# Patient Record
Sex: FEMALE | Race: OTHER | URBAN - METROPOLITAN AREA
[De-identification: names, ages, dates, MRNs, and addresses within clinical notes are randomized per-mention and may not be internally consistent; named-entity substitution may affect disease eponyms.]

---

## 2017-07-29 ENCOUNTER — INPATIENT (INPATIENT)
Facility: HOSPITAL | Age: 62
LOS: 1 days | Discharge: HOME | End: 2017-07-31
Attending: INTERNAL MEDICINE

## 2017-07-29 DIAGNOSIS — I16.0 HYPERTENSIVE URGENCY: ICD-10-CM

## 2017-08-03 DIAGNOSIS — M06.9 RHEUMATOID ARTHRITIS, UNSPECIFIED: ICD-10-CM

## 2017-08-03 DIAGNOSIS — M32.14 GLOMERULAR DISEASE IN SYSTEMIC LUPUS ERYTHEMATOSUS: ICD-10-CM

## 2017-08-03 DIAGNOSIS — N18.9 CHRONIC KIDNEY DISEASE, UNSPECIFIED: ICD-10-CM

## 2017-08-03 DIAGNOSIS — I12.9 HYPERTENSIVE CHRONIC KIDNEY DISEASE WITH STAGE 1 THROUGH STAGE 4 CHRONIC KIDNEY DISEASE, OR UNSPECIFIED CHRONIC KIDNEY DISEASE: ICD-10-CM

## 2017-08-03 DIAGNOSIS — E85.4 ORGAN-LIMITED AMYLOIDOSIS: ICD-10-CM

## 2017-08-03 DIAGNOSIS — I16.0 HYPERTENSIVE URGENCY: ICD-10-CM

## 2017-08-03 DIAGNOSIS — E87.1 HYPO-OSMOLALITY AND HYPONATREMIA: ICD-10-CM

## 2017-08-03 DIAGNOSIS — Z79.52 LONG TERM (CURRENT) USE OF SYSTEMIC STEROIDS: ICD-10-CM

## 2022-10-19 ENCOUNTER — NON-APPOINTMENT (OUTPATIENT)
Age: 67
End: 2022-10-19

## 2024-05-10 ENCOUNTER — APPOINTMENT (OUTPATIENT)
Dept: ORTHOPEDIC SURGERY | Facility: CLINIC | Age: 69
End: 2024-05-10
Payer: MEDICARE

## 2024-05-10 DIAGNOSIS — M24.411 RECURRENT DISLOCATION, RIGHT SHOULDER: ICD-10-CM

## 2024-05-10 PROBLEM — Z00.00 ENCOUNTER FOR PREVENTIVE HEALTH EXAMINATION: Status: ACTIVE | Noted: 2024-05-10

## 2024-05-10 PROCEDURE — 99204 OFFICE O/P NEW MOD 45 MIN: CPT

## 2024-05-15 PROBLEM — M24.411 RECURRENT DISLOCATION OF RIGHT SHOULDER: Status: ACTIVE | Noted: 2024-05-15

## 2024-05-15 NOTE — HISTORY OF PRESENT ILLNESS
[de-identified] : Patient is here for evaluation of right shoulder pain Affecting quality of life Has pain and weakness with loss of rom Wakes up at night due to pain  h/o multiple dislocations  pmhx ra, lupus, amyloidosis  NAD Right shoulder: TTP ant GH joint, bicipital groove FF 0-190 Pain with terminal rom Weakness to abduction and ER Pos Impingement Pos Cornejo Pos Cross Arm Adduction Negative instability Positive scapula dyskinesia  mri/ct right shoulder: massive 3 tendon tear, arthropathy  Plan went over findings explained the imaging explained tx options due to tears and arthropathy with instability, will proceed with surgery  right reverse shoulder arthroplasty  Operative and nonoperative options discussed with patient. Surgical risks, benefits, and alternatives explained. Surgical risks include but are not exclusive to bleeding, infection, neurovascular damage, continued pain, stiffness, scarring, rsd, dvt/pe, potential failure of surgery that may require further surgery in the future. I went over incisions and rehabilitation. All questions answered.

## 2024-05-18 ENCOUNTER — INPATIENT (INPATIENT)
Facility: HOSPITAL | Age: 69
LOS: 5 days | Discharge: HOME HEALTH | DRG: 641 | End: 2024-05-24
Attending: INTERNAL MEDICINE | Admitting: STUDENT IN AN ORGANIZED HEALTH CARE EDUCATION/TRAINING PROGRAM
Payer: MEDICARE

## 2024-05-18 VITALS
OXYGEN SATURATION: 99 % | TEMPERATURE: 99 F | DIASTOLIC BLOOD PRESSURE: 82 MMHG | RESPIRATION RATE: 18 BRPM | HEART RATE: 80 BPM | SYSTOLIC BLOOD PRESSURE: 132 MMHG

## 2024-05-18 LAB
ALBUMIN SERPL ELPH-MCNC: 3.2 G/DL — LOW (ref 3.5–5.2)
ALP SERPL-CCNC: 60 U/L — SIGNIFICANT CHANGE UP (ref 30–115)
ALT FLD-CCNC: 8 U/L — SIGNIFICANT CHANGE UP (ref 0–41)
ANION GAP SERPL CALC-SCNC: 7 MMOL/L — SIGNIFICANT CHANGE UP (ref 7–14)
APTT BLD: 19.9 SEC — CRITICAL LOW (ref 27–39.2)
AST SERPL-CCNC: 26 U/L — SIGNIFICANT CHANGE UP (ref 0–41)
BASE EXCESS BLDV CALC-SCNC: -2.3 MMOL/L — LOW (ref -2–3)
BASOPHILS # BLD AUTO: 0.02 K/UL — SIGNIFICANT CHANGE UP (ref 0–0.2)
BASOPHILS NFR BLD AUTO: 0.5 % — SIGNIFICANT CHANGE UP (ref 0–1)
BILIRUB SERPL-MCNC: 0.2 MG/DL — SIGNIFICANT CHANGE UP (ref 0.2–1.2)
BUN SERPL-MCNC: 51 MG/DL — HIGH (ref 10–20)
CA-I SERPL-SCNC: 1.2 MMOL/L — SIGNIFICANT CHANGE UP (ref 1.15–1.33)
CALCIUM SERPL-MCNC: 8.1 MG/DL — LOW (ref 8.4–10.5)
CHLORIDE SERPL-SCNC: 100 MMOL/L — SIGNIFICANT CHANGE UP (ref 98–110)
CO2 SERPL-SCNC: 24 MMOL/L — SIGNIFICANT CHANGE UP (ref 17–32)
CREAT SERPL-MCNC: 2 MG/DL — HIGH (ref 0.7–1.5)
EGFR: 26 ML/MIN/1.73M2 — LOW
EOSINOPHIL # BLD AUTO: 0.06 K/UL — SIGNIFICANT CHANGE UP (ref 0–0.7)
EOSINOPHIL NFR BLD AUTO: 1.4 % — SIGNIFICANT CHANGE UP (ref 0–8)
GAS PNL BLDV: 130 MMOL/L — LOW (ref 136–145)
GAS PNL BLDV: SIGNIFICANT CHANGE UP
GAS PNL BLDV: SIGNIFICANT CHANGE UP
GLUCOSE SERPL-MCNC: 120 MG/DL — HIGH (ref 70–99)
HCO3 BLDV-SCNC: 23 MMOL/L — SIGNIFICANT CHANGE UP (ref 22–29)
HCT VFR BLD CALC: 26.9 % — LOW (ref 37–47)
HCT VFR BLDA CALC: 29 % — LOW (ref 34.5–46.5)
HGB BLD CALC-MCNC: 9.5 G/DL — LOW (ref 11.7–16.1)
HGB BLD-MCNC: 9.1 G/DL — LOW (ref 12–16)
IMM GRANULOCYTES NFR BLD AUTO: 0.5 % — HIGH (ref 0.1–0.3)
INR BLD: 0.91 RATIO — SIGNIFICANT CHANGE UP (ref 0.65–1.3)
LACTATE BLDV-MCNC: 0.5 MMOL/L — SIGNIFICANT CHANGE UP (ref 0.5–2)
LYMPHOCYTES # BLD AUTO: 0.95 K/UL — LOW (ref 1.2–3.4)
LYMPHOCYTES # BLD AUTO: 21.9 % — SIGNIFICANT CHANGE UP (ref 20.5–51.1)
MAGNESIUM SERPL-MCNC: 1.7 MG/DL — LOW (ref 1.8–2.4)
MCHC RBC-ENTMCNC: 31.7 PG — HIGH (ref 27–31)
MCHC RBC-ENTMCNC: 33.8 G/DL — SIGNIFICANT CHANGE UP (ref 32–37)
MCV RBC AUTO: 93.7 FL — SIGNIFICANT CHANGE UP (ref 81–99)
MONOCYTES # BLD AUTO: 0.53 K/UL — SIGNIFICANT CHANGE UP (ref 0.1–0.6)
MONOCYTES NFR BLD AUTO: 12.2 % — HIGH (ref 1.7–9.3)
NEUTROPHILS # BLD AUTO: 2.76 K/UL — SIGNIFICANT CHANGE UP (ref 1.4–6.5)
NEUTROPHILS NFR BLD AUTO: 63.5 % — SIGNIFICANT CHANGE UP (ref 42.2–75.2)
NRBC # BLD: 0 /100 WBCS — SIGNIFICANT CHANGE UP (ref 0–0)
NT-PROBNP SERPL-SCNC: HIGH PG/ML (ref 0–300)
PCO2 BLDV: 42 MMHG — SIGNIFICANT CHANGE UP (ref 39–42)
PH BLDV: 7.35 — SIGNIFICANT CHANGE UP (ref 7.32–7.43)
PLATELET # BLD AUTO: 126 K/UL — LOW (ref 130–400)
PMV BLD: 9.8 FL — SIGNIFICANT CHANGE UP (ref 7.4–10.4)
PO2 BLDV: 59 MMHG — HIGH (ref 25–45)
POTASSIUM BLDV-SCNC: 6.1 MMOL/L — HIGH (ref 3.5–5.1)
POTASSIUM SERPL-MCNC: SIGNIFICANT CHANGE UP MMOL/L (ref 3.5–5)
POTASSIUM SERPL-SCNC: SIGNIFICANT CHANGE UP MMOL/L (ref 3.5–5)
PROT SERPL-MCNC: 5.3 G/DL — LOW (ref 6–8)
PROTHROM AB SERPL-ACNC: 10.4 SEC — SIGNIFICANT CHANGE UP (ref 9.95–12.87)
RBC # BLD: 2.87 M/UL — LOW (ref 4.2–5.4)
RBC # FLD: 14.4 % — SIGNIFICANT CHANGE UP (ref 11.5–14.5)
SAO2 % BLDV: 91.9 % — HIGH (ref 67–88)
SODIUM SERPL-SCNC: 131 MMOL/L — LOW (ref 135–146)
TROPONIN T, HIGH SENSITIVITY RESULT: 62 NG/L — CRITICAL HIGH (ref 6–13)
WBC # BLD: 4.34 K/UL — LOW (ref 4.8–10.8)
WBC # FLD AUTO: 4.34 K/UL — LOW (ref 4.8–10.8)

## 2024-05-18 PROCEDURE — 99285 EMERGENCY DEPT VISIT HI MDM: CPT | Mod: FS

## 2024-05-18 PROCEDURE — 70450 CT HEAD/BRAIN W/O DYE: CPT | Mod: 26,MC

## 2024-05-18 PROCEDURE — 93010 ELECTROCARDIOGRAM REPORT: CPT

## 2024-05-18 PROCEDURE — 71045 X-RAY EXAM CHEST 1 VIEW: CPT | Mod: 26

## 2024-05-18 PROCEDURE — 93970 EXTREMITY STUDY: CPT | Mod: 26

## 2024-05-18 RX ORDER — ASPIRIN/CALCIUM CARB/MAGNESIUM 324 MG
324 TABLET ORAL ONCE
Refills: 0 | Status: COMPLETED | OUTPATIENT
Start: 2024-05-18 | End: 2024-05-18

## 2024-05-18 RX ORDER — MECLIZINE HCL 12.5 MG
25 TABLET ORAL ONCE
Refills: 0 | Status: COMPLETED | OUTPATIENT
Start: 2024-05-18 | End: 2024-05-18

## 2024-05-18 RX ADMIN — Medication 324 MILLIGRAM(S): at 23:57

## 2024-05-18 RX ADMIN — Medication 25 MILLIGRAM(S): at 22:10

## 2024-05-18 NOTE — ED ADULT TRIAGE NOTE - CHIEF COMPLAINT QUOTE
hx of HTN, compliant with medications. reports BP has been elevated for past couple days and c/o dizziness. 211/119 at 1800 today

## 2024-05-18 NOTE — ED PROVIDER NOTE - WHICH SHOWED
ED EKG: my independent interpretation - Dr. Brandin Solitario : [NSR, nl axis, nl intervals, no ST elevation]

## 2024-05-18 NOTE — ED PROVIDER NOTE - PROGRESS NOTE DETAILS
Patient is admitted for hyperkalemia, chest pain, and elevated trop. Spoke with cardio who will see the patient after admission. Pt is aware of the plan and agrees. Pt has been endorsed to MAR. TJY RESIDENT MDM: pt presenting for elevated BP and leg swelling, will assess for hypertensive emergency/urgency and PE.

## 2024-05-18 NOTE — ED ADULT NURSE NOTE - NSFALLHARMRISKINTERV_ED_ALL_ED

## 2024-05-18 NOTE — ED PROVIDER NOTE - PRIOR OUTPATIENT RADIOLOGY SUMMARY
previous echo from [facility] done 4/17/24 EF normal no pce previous echo from [facility] done 4/17/24

## 2024-05-18 NOTE — ED PROVIDER NOTE - OBJECTIVE STATEMENT
68-year-old female with a past medical history of hypertension, DVT not on AC, rheumatoid arthritis on prednisone who presents to the ED for evaluation.  Per family, patient has been having elevated blood pressure since 5 days ago; baseline blood pressure is 150 systolic, ,but blood pressure has been in the 200s systolic.  Also endorses dizziness that started earlier today; intermittent and only occurs with movement.  Also endorses general weakness.  Also endorses chest pain that started earlier today; pain is in the middle of the chest, nonradiating, nonexertional, dull, and there is no alleviating worsening.  Also endorses shortness of breath along with her symptoms.  Denies fever, nausea, vomiting, abdominal pain, urinary symptoms, and change with bowel movement.

## 2024-05-18 NOTE — ED PROVIDER NOTE - CLINICAL SUMMARY MEDICAL DECISION MAKING FREE TEXT BOX
68-year-old female history of hypertension rheumatoid arthritis on prednisone DVT no longer on Eliquis presenting here with multiple complaints.  As per daughter and granddaughter at bedside patient has been having elevated blood pressures last 5 days 200/100 as they have been checking it because patient needs to be scheduled for outpatient rotator cuff surgery of the shoulder.  Today patient also began having some lightheadedness with walking as well as started having some chest pain midsternal today.  Family also states has been having bilateral lower extremity swelling for the last 3 days left greater than right.  Otherwise has been tolerating p.o.  Vital signs reviewed Labs imaging EKG obtained and reviewed ED EKG: my independent interpretation - Dr. Brandin Solitario : [NSR, nl axis, nl intervals, no ST elevation] Cardiology was consulted given elevated troponin.  Duplex preliminary chronic DVTs however given elevated troponin BNP a chest PE study ordered which demonstrated no PE cardiomegaly and small pericardial effusion aspirin given patient also found to be hyperkalemic calcium albuterol insulin given Lasix given for possible fluid overload patient admitted

## 2024-05-18 NOTE — ED PROVIDER NOTE - CONSIDERATION OF ADMISSION OBSERVATION
Consideration of Admission/Observation Appropriate medications for patients presenting complaints were offered and effects were re-assessed Patients external records reviewed. Additional history was obtained from daughter and granddaughter . Escalation to admission was considered. At this time patient requires inpatient hospitalization.

## 2024-05-18 NOTE — ED PROVIDER NOTE - PHYSICAL EXAMINATION
CONSTITUTIONAL: in no apparent distress.   HEAD: Normocephalic; atraumatic.   EYES: Pupils are round and reactive, extra-ocular muscles are intact. Eyelids are normal in appearance without swelling or lesions.   ENT: Hearing is intact with good acuity to spoken voice.  Patient is speaking clearly, not muffled and airway is intact.   RESPIRATORY: No signs of respiratory distress. Lung sounds are clear in all lobes bilaterally without rales, rhonchi, or wheezes.  CARDIOVASCULAR: Regular rate and rhythm.   GI: Abdomen is soft, non-tender, and without distention. Bowel sounds are present and normoactive in all four quadrants. No masses are noted.   MS: B/L pitting edema noticed.  NEURO: A & O x 3. Normal speech. No focal deficit.  PSYCHOLOGICAL: Appropriate mood and affect. Good judgement and insight.

## 2024-05-18 NOTE — ED PROVIDER NOTE - CARE PLAN
Principal Discharge DX:	Hyperkalemia  Secondary Diagnosis:	Elevated troponin  Secondary Diagnosis:	Fluid overload   1

## 2024-05-18 NOTE — ED PROVIDER NOTE - ATTENDING APP SHARED VISIT CONTRIBUTION OF CARE
68-year-old female history of hypertension rheumatoid arthritis on prednisone DVT no longer on Eliquis presenting here with multiple complaints.  As per daughter and granddaughter at bedside patient has been having elevated blood pressures last 5 days 200/100 as they have been checking it because patient needs to be scheduled for outpatient rotator cuff surgery of the shoulder.  Today patient also began having some lightheadedness with walking as well as started having some chest pain midsternal today.  Family also states has been having bilateral lower extremity swelling for the last 3 days left greater than right.  Otherwise has been tolerating p.o.   CONSTITUTIONAL: WA / WN / NAD  HEAD: NCAT  EYES: PERRL; EOMI;   ENT: Normal pharynx; mucous membranes pink/moist, no erythema.  NECK: Supple; no meningeal signs  CARD: RRR; nl S1/S2; no M/R/G. Pulses equal bilaterally.  RESP: Respiratory rate and effort are normal; breath sounds clear and equal bilaterally.  ABD: Soft, NT ND   MSK/EXT: No gross deformities 2+ pitting edema by the ankles b/l distal pulses In tact.   SKIN: Warm and dry;   NEURO: AAOx3, Motor 5/5 x 4 extremities  PSYCH: Memory Intact, Normal Affect

## 2024-05-19 ENCOUNTER — RESULT REVIEW (OUTPATIENT)
Age: 69
End: 2024-05-19

## 2024-05-19 DIAGNOSIS — E87.5 HYPERKALEMIA: ICD-10-CM

## 2024-05-19 LAB
ALBUMIN SERPL ELPH-MCNC: 2.9 G/DL — LOW (ref 3.5–5.2)
ALBUMIN SERPL ELPH-MCNC: 3.2 G/DL — LOW (ref 3.5–5.2)
ALP SERPL-CCNC: 57 U/L — SIGNIFICANT CHANGE UP (ref 30–115)
ALP SERPL-CCNC: 61 U/L — SIGNIFICANT CHANGE UP (ref 30–115)
ALT FLD-CCNC: 8 U/L — SIGNIFICANT CHANGE UP (ref 0–41)
ALT FLD-CCNC: 9 U/L — SIGNIFICANT CHANGE UP (ref 0–41)
ANION GAP SERPL CALC-SCNC: 10 MMOL/L — SIGNIFICANT CHANGE UP (ref 7–14)
ANION GAP SERPL CALC-SCNC: 9 MMOL/L — SIGNIFICANT CHANGE UP (ref 7–14)
AST SERPL-CCNC: 17 U/L — SIGNIFICANT CHANGE UP (ref 0–41)
AST SERPL-CCNC: 18 U/L — SIGNIFICANT CHANGE UP (ref 0–41)
BASOPHILS # BLD AUTO: 0.02 K/UL — SIGNIFICANT CHANGE UP (ref 0–0.2)
BASOPHILS NFR BLD AUTO: 0.5 % — SIGNIFICANT CHANGE UP (ref 0–1)
BILIRUB SERPL-MCNC: 0.3 MG/DL — SIGNIFICANT CHANGE UP (ref 0.2–1.2)
BILIRUB SERPL-MCNC: <0.2 MG/DL — SIGNIFICANT CHANGE UP (ref 0.2–1.2)
BUN SERPL-MCNC: 46 MG/DL — HIGH (ref 10–20)
BUN SERPL-MCNC: 47 MG/DL — HIGH (ref 10–20)
CALCIUM SERPL-MCNC: 8 MG/DL — LOW (ref 8.4–10.4)
CALCIUM SERPL-MCNC: 8.6 MG/DL — SIGNIFICANT CHANGE UP (ref 8.4–10.5)
CHLORIDE SERPL-SCNC: 100 MMOL/L — SIGNIFICANT CHANGE UP (ref 98–110)
CHLORIDE SERPL-SCNC: 99 MMOL/L — SIGNIFICANT CHANGE UP (ref 98–110)
CO2 SERPL-SCNC: 23 MMOL/L — SIGNIFICANT CHANGE UP (ref 17–32)
CO2 SERPL-SCNC: 23 MMOL/L — SIGNIFICANT CHANGE UP (ref 17–32)
CREAT SERPL-MCNC: 2.1 MG/DL — HIGH (ref 0.7–1.5)
CREAT SERPL-MCNC: 2.2 MG/DL — HIGH (ref 0.7–1.5)
EGFR: 24 ML/MIN/1.73M2 — LOW
EGFR: 25 ML/MIN/1.73M2 — LOW
EOSINOPHIL # BLD AUTO: 0.14 K/UL — SIGNIFICANT CHANGE UP (ref 0–0.7)
EOSINOPHIL NFR BLD AUTO: 3.3 % — SIGNIFICANT CHANGE UP (ref 0–8)
GLUCOSE SERPL-MCNC: 146 MG/DL — HIGH (ref 70–99)
GLUCOSE SERPL-MCNC: 70 MG/DL — SIGNIFICANT CHANGE UP (ref 70–99)
HCT VFR BLD CALC: 28.3 % — LOW (ref 37–47)
HGB BLD-MCNC: 9.4 G/DL — LOW (ref 12–16)
IMM GRANULOCYTES NFR BLD AUTO: 0.5 % — HIGH (ref 0.1–0.3)
LYMPHOCYTES # BLD AUTO: 1.11 K/UL — LOW (ref 1.2–3.4)
LYMPHOCYTES # BLD AUTO: 26.3 % — SIGNIFICANT CHANGE UP (ref 20.5–51.1)
MAGNESIUM SERPL-MCNC: 1.7 MG/DL — LOW (ref 1.8–2.4)
MCHC RBC-ENTMCNC: 31.2 PG — HIGH (ref 27–31)
MCHC RBC-ENTMCNC: 33.2 G/DL — SIGNIFICANT CHANGE UP (ref 32–37)
MCV RBC AUTO: 94 FL — SIGNIFICANT CHANGE UP (ref 81–99)
MONOCYTES # BLD AUTO: 0.61 K/UL — HIGH (ref 0.1–0.6)
MONOCYTES NFR BLD AUTO: 14.5 % — HIGH (ref 1.7–9.3)
NEUTROPHILS # BLD AUTO: 2.32 K/UL — SIGNIFICANT CHANGE UP (ref 1.4–6.5)
NEUTROPHILS NFR BLD AUTO: 54.9 % — SIGNIFICANT CHANGE UP (ref 42.2–75.2)
NRBC # BLD: 0 /100 WBCS — SIGNIFICANT CHANGE UP (ref 0–0)
PLATELET # BLD AUTO: 132 K/UL — SIGNIFICANT CHANGE UP (ref 130–400)
PMV BLD: 10.2 FL — SIGNIFICANT CHANGE UP (ref 7.4–10.4)
POTASSIUM SERPL-MCNC: 4.7 MMOL/L — SIGNIFICANT CHANGE UP (ref 3.5–5)
POTASSIUM SERPL-MCNC: 5 MMOL/L — SIGNIFICANT CHANGE UP (ref 3.5–5)
POTASSIUM SERPL-SCNC: 4.7 MMOL/L — SIGNIFICANT CHANGE UP (ref 3.5–5)
POTASSIUM SERPL-SCNC: 5 MMOL/L — SIGNIFICANT CHANGE UP (ref 3.5–5)
PROT SERPL-MCNC: 4.7 G/DL — LOW (ref 6–8)
PROT SERPL-MCNC: 4.9 G/DL — LOW (ref 6–8)
RBC # BLD: 3.01 M/UL — LOW (ref 4.2–5.4)
RBC # FLD: 14.5 % — SIGNIFICANT CHANGE UP (ref 11.5–14.5)
SODIUM SERPL-SCNC: 131 MMOL/L — LOW (ref 135–146)
SODIUM SERPL-SCNC: 133 MMOL/L — LOW (ref 135–146)
TROPONIN T, HIGH SENSITIVITY RESULT: 56 NG/L — CRITICAL HIGH (ref 6–13)
TROPONIN T, HIGH SENSITIVITY RESULT: 63 NG/L — CRITICAL HIGH (ref 6–13)
WBC # BLD: 4.22 K/UL — LOW (ref 4.8–10.8)
WBC # FLD AUTO: 4.22 K/UL — LOW (ref 4.8–10.8)

## 2024-05-19 PROCEDURE — 83970 ASSAY OF PARATHORMONE: CPT

## 2024-05-19 PROCEDURE — 83930 ASSAY OF BLOOD OSMOLALITY: CPT

## 2024-05-19 PROCEDURE — 81001 URINALYSIS AUTO W/SCOPE: CPT

## 2024-05-19 PROCEDURE — 78803 RP LOCLZJ TUM SPECT 1 AREA: CPT | Mod: MC

## 2024-05-19 PROCEDURE — 82088 ASSAY OF ALDOSTERONE: CPT

## 2024-05-19 PROCEDURE — 36415 COLL VENOUS BLD VENIPUNCTURE: CPT

## 2024-05-19 PROCEDURE — 80048 BASIC METABOLIC PNL TOTAL CA: CPT

## 2024-05-19 PROCEDURE — 82570 ASSAY OF URINE CREATININE: CPT

## 2024-05-19 PROCEDURE — 82652 VIT D 1 25-DIHYDROXY: CPT

## 2024-05-19 PROCEDURE — 83521 IG LIGHT CHAINS FREE EACH: CPT

## 2024-05-19 PROCEDURE — 83036 HEMOGLOBIN GLYCOSYLATED A1C: CPT

## 2024-05-19 PROCEDURE — 93306 TTE W/DOPPLER COMPLETE: CPT | Mod: 26

## 2024-05-19 PROCEDURE — 93976 VASCULAR STUDY: CPT

## 2024-05-19 PROCEDURE — 83735 ASSAY OF MAGNESIUM: CPT

## 2024-05-19 PROCEDURE — 82310 ASSAY OF CALCIUM: CPT

## 2024-05-19 PROCEDURE — 85025 COMPLETE CBC W/AUTO DIFF WBC: CPT

## 2024-05-19 PROCEDURE — 80053 COMPREHEN METABOLIC PANEL: CPT

## 2024-05-19 PROCEDURE — A9500: CPT

## 2024-05-19 PROCEDURE — 83835 ASSAY OF METANEPHRINES: CPT

## 2024-05-19 PROCEDURE — A9560: CPT

## 2024-05-19 PROCEDURE — 84443 ASSAY THYROID STIM HORMONE: CPT

## 2024-05-19 PROCEDURE — 78452 HT MUSCLE IMAGE SPECT MULT: CPT | Mod: MC

## 2024-05-19 PROCEDURE — 82306 VITAMIN D 25 HYDROXY: CPT

## 2024-05-19 PROCEDURE — 93306 TTE W/DOPPLER COMPLETE: CPT

## 2024-05-19 PROCEDURE — 99223 1ST HOSP IP/OBS HIGH 75: CPT

## 2024-05-19 PROCEDURE — 84156 ASSAY OF PROTEIN URINE: CPT

## 2024-05-19 PROCEDURE — 80061 LIPID PANEL: CPT

## 2024-05-19 PROCEDURE — 84244 ASSAY OF RENIN: CPT

## 2024-05-19 PROCEDURE — 97162 PT EVAL MOD COMPLEX 30 MIN: CPT | Mod: GP

## 2024-05-19 PROCEDURE — 93017 CV STRESS TEST TRACING ONLY: CPT

## 2024-05-19 PROCEDURE — 71275 CT ANGIOGRAPHY CHEST: CPT | Mod: 26,MC

## 2024-05-19 PROCEDURE — 84484 ASSAY OF TROPONIN QUANT: CPT

## 2024-05-19 PROCEDURE — 93010 ELECTROCARDIOGRAM REPORT: CPT

## 2024-05-19 PROCEDURE — 86334 IMMUNOFIX E-PHORESIS SERUM: CPT

## 2024-05-19 RX ORDER — PANTOPRAZOLE SODIUM 20 MG/1
40 TABLET, DELAYED RELEASE ORAL
Refills: 0 | Status: DISCONTINUED | OUTPATIENT
Start: 2024-05-19 | End: 2024-05-24

## 2024-05-19 RX ORDER — ONDANSETRON 8 MG/1
4 TABLET, FILM COATED ORAL EVERY 8 HOURS
Refills: 0 | Status: DISCONTINUED | OUTPATIENT
Start: 2024-05-19 | End: 2024-05-24

## 2024-05-19 RX ORDER — LOSARTAN POTASSIUM 100 MG/1
1 TABLET, FILM COATED ORAL
Refills: 0 | DISCHARGE

## 2024-05-19 RX ORDER — FUROSEMIDE 40 MG
20 TABLET ORAL ONCE
Refills: 0 | Status: COMPLETED | OUTPATIENT
Start: 2024-05-20 | End: 2024-05-20

## 2024-05-19 RX ORDER — ALBUTEROL 90 UG/1
2.5 AEROSOL, METERED ORAL ONCE
Refills: 0 | Status: COMPLETED | OUTPATIENT
Start: 2024-05-19 | End: 2024-05-19

## 2024-05-19 RX ORDER — ASPIRIN/CALCIUM CARB/MAGNESIUM 324 MG
81 TABLET ORAL DAILY
Refills: 0 | Status: DISCONTINUED | OUTPATIENT
Start: 2024-05-19 | End: 2024-05-24

## 2024-05-19 RX ORDER — LOSARTAN POTASSIUM 100 MG/1
25 TABLET, FILM COATED ORAL ONCE
Refills: 0 | Status: COMPLETED | OUTPATIENT
Start: 2024-05-19 | End: 2024-05-19

## 2024-05-19 RX ORDER — INSULIN HUMAN 100 [IU]/ML
5 INJECTION, SOLUTION SUBCUTANEOUS ONCE
Refills: 0 | Status: COMPLETED | OUTPATIENT
Start: 2024-05-19 | End: 2024-05-19

## 2024-05-19 RX ORDER — ACETAMINOPHEN 500 MG
650 TABLET ORAL EVERY 6 HOURS
Refills: 0 | Status: DISCONTINUED | OUTPATIENT
Start: 2024-05-19 | End: 2024-05-24

## 2024-05-19 RX ORDER — DENOSUMAB 60 MG/ML
60 INJECTION SUBCUTANEOUS
Refills: 0 | DISCHARGE

## 2024-05-19 RX ORDER — ETANERCEPT 25 MG
25 VIAL (EA) SUBCUTANEOUS
Refills: 0 | DISCHARGE

## 2024-05-19 RX ORDER — DEXTROSE 50 % IN WATER 50 %
25 SYRINGE (ML) INTRAVENOUS ONCE
Refills: 0 | Status: COMPLETED | OUTPATIENT
Start: 2024-05-19 | End: 2024-05-19

## 2024-05-19 RX ORDER — LOSARTAN POTASSIUM 100 MG/1
25 TABLET, FILM COATED ORAL DAILY
Refills: 0 | Status: DISCONTINUED | OUTPATIENT
Start: 2024-05-19 | End: 2024-05-22

## 2024-05-19 RX ORDER — FUROSEMIDE 40 MG
40 TABLET ORAL ONCE
Refills: 0 | Status: COMPLETED | OUTPATIENT
Start: 2024-05-19 | End: 2024-05-19

## 2024-05-19 RX ORDER — LABETALOL HCL 100 MG
300 TABLET ORAL EVERY 12 HOURS
Refills: 0 | Status: DISCONTINUED | OUTPATIENT
Start: 2024-05-19 | End: 2024-05-22

## 2024-05-19 RX ORDER — OMEPRAZOLE 10 MG/1
1 CAPSULE, DELAYED RELEASE ORAL
Refills: 0 | DISCHARGE

## 2024-05-19 RX ORDER — LANOLIN ALCOHOL/MO/W.PET/CERES
3 CREAM (GRAM) TOPICAL AT BEDTIME
Refills: 0 | Status: DISCONTINUED | OUTPATIENT
Start: 2024-05-19 | End: 2024-05-24

## 2024-05-19 RX ORDER — CALCIUM GLUCONATE 100 MG/ML
2 VIAL (ML) INTRAVENOUS ONCE
Refills: 0 | Status: COMPLETED | OUTPATIENT
Start: 2024-05-19 | End: 2024-05-19

## 2024-05-19 RX ORDER — SODIUM ZIRCONIUM CYCLOSILICATE 10 G/10G
10 POWDER, FOR SUSPENSION ORAL ONCE
Refills: 0 | Status: COMPLETED | OUTPATIENT
Start: 2024-05-19 | End: 2024-05-19

## 2024-05-19 RX ORDER — MAGNESIUM OXIDE 400 MG ORAL TABLET 241.3 MG
400 TABLET ORAL ONCE
Refills: 0 | Status: COMPLETED | OUTPATIENT
Start: 2024-05-19 | End: 2024-05-19

## 2024-05-19 RX ADMIN — SODIUM ZIRCONIUM CYCLOSILICATE 10 GRAM(S): 10 POWDER, FOR SUSPENSION ORAL at 01:20

## 2024-05-19 RX ADMIN — LOSARTAN POTASSIUM 25 MILLIGRAM(S): 100 TABLET, FILM COATED ORAL at 22:59

## 2024-05-19 RX ADMIN — INSULIN HUMAN 5 UNIT(S): 100 INJECTION, SOLUTION SUBCUTANEOUS at 01:20

## 2024-05-19 RX ADMIN — Medication 81 MILLIGRAM(S): at 15:14

## 2024-05-19 RX ADMIN — ALBUTEROL 2.5 MILLIGRAM(S): 90 AEROSOL, METERED ORAL at 01:20

## 2024-05-19 RX ADMIN — Medication 100 GRAM(S): at 01:20

## 2024-05-19 RX ADMIN — MAGNESIUM OXIDE 400 MG ORAL TABLET 400 MILLIGRAM(S): 241.3 TABLET ORAL at 19:55

## 2024-05-19 RX ADMIN — Medication 300 MILLIGRAM(S): at 07:47

## 2024-05-19 RX ADMIN — Medication 650 MILLIGRAM(S): at 21:37

## 2024-05-19 RX ADMIN — Medication 300 MILLIGRAM(S): at 17:52

## 2024-05-19 RX ADMIN — Medication 40 MILLIGRAM(S): at 02:19

## 2024-05-19 RX ADMIN — Medication 25 GRAM(S): at 01:20

## 2024-05-19 NOTE — H&P ADULT - ATTENDING COMMENTS
68-year-old female with a past medical history of hypertension, Osteoporosis, DVT not on AC s/p IVC filter (still present), Rheumatoid arthritis, SLE on Prednisone/Enbrel who presents to the ED for evaluation of refractory HTN.    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   #Frequent Sleep Apneas at night - high STOP/BANG score   **currently -140s - controlled   ** BP uncontrol mainly in mornings and during nighttime   - Monitor BP   - Urinalysis/studies   - Telemetry   - consider consult Pulm and f/u OP with pulmonary for sleep study     #Cardiomegaly   #Mild volume overload with JVD present   #NSTEMI   #Likely concentric heart failure in the setting of chronic HTN   - trend CE/EKG   - control BP  - TTE - she did a TTE at [facility] and recently and showed  EF 75% - septal LVH - G1DD - hyperdynamic LVSF/no wall motion abn - mild valvular abn   - c/w lasix 20   - c/w home meds   - consider adding HCTZ if still uncontrolled by tomorrow       Chronic Issues     #Rheumatoid arthritis on prednisone   #HX of DVT s/p IVC filter   #Osteoporosis   prelim [facility] doppler showed DVTs   - A1c/lipid profile   - c/w home meds     #DVT - IVC filter    #GI - pantoprazole     #Code - Full code

## 2024-05-19 NOTE — H&P ADULT - HISTORY OF PRESENT ILLNESS
68-year-old female with a past medical history of hypertension, DVT not on AC, rheumatoid arthritis on prednisone who presents to the ED for evaluation of refractory HTN.  Per family, patient has been having elevated blood pressure since 5 days ago; baseline blood pressure is 150 systolic, but blood pressure has been in the 200s systolic.   Also endorses dizziness that started earlier today; intermittent and only occurs with movement.    Also endorses chest pain that started earlier today; pain is in the middle of the chest, nonradiating, nonexertional, dull, and there is no alleviating worsening.    Also endorses shortness of breath along with her symptoms.     Vitals HTN  -> 180s   Labs trops 62>56 - BNP 12K - Hb 9.4 - creatinine 2.1 - VBG noted   EKG NSR ian   Imaging: CXR no pulm abnormalities - cardiomegaly   [facility] doppler LE prelim shows signs of DVT   CT angio chest neg for PE - cardiomegaly - compression fracture  In the ED, given lasix 40 once - calcium gluconate - lokelma - D5/regular insulin - labetalol 300 BID - aspirion 325 68-year-old female with a past medical history of hypertension, Osteoporosis, DVT not on AC s/p IVC filter (still present), Rheumatoid arthritis, SLE on Prednisone/Enbrel who presents to the ED for evaluation of refractory HTN.  Per family, patient has been having elevated blood pressure since 5 days ago; baseline blood pressure is 150 systolic, but blood pressure has been in the 200s systolic.   Also endorses dizziness that started earlier today; intermittent and only occurs with movement.    Also endorses chest pain that started earlier today; pain is in the middle of the chest, nonradiating, nonexertional, dull, and there is no alleviating worsening.    Also endorses shortness of breath along with her symptoms.   She is snoring at night and have a lot of apneas   She follows with a cardiologist at [facility]     Vitals HTN  -> 180s   Labs trops 62>56 - BNP 12K - Hb 9.4 - creatinine 2.1 - VBG noted   EKG NSR ian   Imaging: CXR no pulm abnormalities - cardiomegaly   [facility] doppler LE prelim shows signs of DVT   CT angio chest neg for PE - cardiomegaly - compression fracture  In the ED, given lasix 40 once - calcium gluconate - lokelma - D5/regular insulin - labetalol 300 BID - aspirion 325

## 2024-05-19 NOTE — ED ADULT NURSE REASSESSMENT NOTE - NS ED NURSE REASSESS COMMENT FT1
patient admitted to teley and moved to ED3 bed10. Report given to NEVILLE Rutherford. Patient remains on cardiac monitor.

## 2024-05-19 NOTE — H&P ADULT - NSHPLABSRESULTS_GEN_ALL_CORE
9.4    4.22  )-----------( 132      ( 19 May 2024 08:01 )             28.3       05-19    133<L>  |  100  |  47<H>  ----------------------------<  70  5.0   |  23  |  2.1<H>    Ca    8.6      19 May 2024 08:01  Mg     1.7     05-19    TPro  4.9<L>  /  Alb  3.2<L>  /  TBili  0.3  /  DBili  x   /  AST  18  /  ALT  9   /  AlkPhos  61  05-19              Urinalysis Basic - ( 19 May 2024 08:01 )    Color: x / Appearance: x / SG: x / pH: x  Gluc: 70 mg/dL / Ketone: x  / Bili: x / Urobili: x   Blood: x / Protein: x / Nitrite: x   Leuk Esterase: x / RBC: x / WBC x   Sq Epi: x / Non Sq Epi: x / Bacteria: x        PT/INR - ( 18 May 2024 22:19 )   PT: 10.40 sec;   INR: 0.91 ratio         PTT - ( 18 May 2024 22:19 )  PTT:19.9 sec    Lactate Trend            CAPILLARY BLOOD GLUCOSE      POCT Blood Glucose.: 93 mg/dL (19 May 2024 01:18)

## 2024-05-19 NOTE — H&P ADULT - NSHPPHYSICALEXAM_GEN_ALL_CORE
LOS:     VITALS:   T(C): 36.7 (05-19-24 @ 07:30), Max: 37.2 (05-18-24 @ 19:59)  HR: 69 (05-19-24 @ 08:34) (66 - 80)  BP: 184/105 (05-19-24 @ 08:34) (132/82 - 240/109)  RR: 18 (05-19-24 @ 08:34) (17 - 18)  SpO2: 99% (05-19-24 @ 08:34) (98% - 99%)    GENERAL: NAD, lying in bed comfortably  NECK: Supple, No JVD  CHEST/LUNG: Clear to auscultation bilaterally; No rales, rhonchi, wheezing, or rubs. Unlabored respirations  HEART: Regular rate and rhythm; No murmurs, rubs, or gallops  ABDOMEN: BSx4; Soft, nontender, nondistended  EXTREMITIES:  2+ Peripheral Pulses, brisk capillary refill. No clubbing, cyanosis, or edema  NERVOUS SYSTEM:  A&Ox3, no focal deficits   SKIN: No rashes or lesions

## 2024-05-19 NOTE — H&P ADULT - ASSESSMENT
68-year-old female with a past medical history of hypertension, DVT not on AC, rheumatoid arthritis on prednisone who presents to the ED for evaluation of refractory HTN.    Active Issues    #HTN emergency   #KHANH   - control BP - currently on labetalol 300 BID   - Start   - Urinalysis/studies   - Telemetry     #Cardiomegaly   #NSTEMI   #Likely concentric heart failure in the setting of chronic HTN   - trend CE/EKG   - control BP  - TTE   - hold diuresis for now    ===========    Chronic Issues     #Rheumatoid arthritis on prednisone   #HX of DVT   prelim [facility] doppler showed DVTs  - consider A/C if no C/I   - A1c/lipid profile   - c/w home meds     #DVT - lovenox sq     #GI - pantoprazole     #Diet - DASH    #Activity - IAT     #Code - Full code     #Disposition - IP  68-year-old female with a past medical history of hypertension, Osteoporosis, DVT not on AC s/p IVC filter (still present), Rheumatoid arthritis, SLE on Prednisone/Enbrel who presents to the ED for evaluation of refractory HTN.    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   #Frequent Sleep Apneas at night - high STOP/BANG score   **currently -140s - controlled   ** BP uncontrol mainly in mornings and during nighttime   - Monitor BP   - Urinalysis/studies   - Telemetry   - consider consult Pulm and f/u OP with pulmonary for sleep study     #Cardiomegaly   #Mild volume overload with JVD present   #NSTEMI   #Likely concentric heart failure in the setting of chronic HTN   - trend CE/EKG   - control BP  - TTE - she did a TTE at [facility] and recently and showed  EF 75% - septal LVH - G1DD - hyperdynamic LVSF/no wall motion abn - mild valvular abn   - c/w lasix 20   - c/w home meds   - consider adding HCTZ if still uncontrolled by tomorrow     ===========    Chronic Issues     #Rheumatoid arthritis on prednisone   #HX of DVT s/p IVC filter   #Osteoporosis   prelim [facility] doppler showed DVTs   - A1c/lipid profile   - c/w home meds     #DVT - IVC filter    #GI - pantoprazole     #Diet - DASH    #Activity - IAT     #Code - Full code     #Disposition - IP

## 2024-05-19 NOTE — ED ADULT NURSE REASSESSMENT NOTE - NS ED NURSE REASSESS COMMENT FT1
Patient received from offgoing RN with /109 HR 66. Patient asymptomatic at this time. Patient admitted to TELE, though no H&P and no orders. Patients daughter, at bedside reports she has not received her HTN meds since yesterday and has been running high at home which prompted her visit. Daughter has home meds at bedside, labetalol 300mg twice daily and losartan 25mg in the evening. MAR called and informed of patient elevated BP. STAT dose labetalol 300mg placed for administration. Plan of care ongoing, patient and daughter updated.

## 2024-05-19 NOTE — PATIENT PROFILE ADULT - FALL HARM RISK - HARM RISK INTERVENTIONS

## 2024-05-20 ENCOUNTER — TRANSCRIPTION ENCOUNTER (OUTPATIENT)
Age: 69
End: 2024-05-20

## 2024-05-20 LAB
A1C WITH ESTIMATED AVERAGE GLUCOSE RESULT: 5.1 % — SIGNIFICANT CHANGE UP (ref 4–5.6)
ALBUMIN SERPL ELPH-MCNC: 3 G/DL — LOW (ref 3.5–5.2)
ALP SERPL-CCNC: 61 U/L — SIGNIFICANT CHANGE UP (ref 30–115)
ALT FLD-CCNC: 9 U/L — SIGNIFICANT CHANGE UP (ref 0–41)
ANION GAP SERPL CALC-SCNC: 11 MMOL/L — SIGNIFICANT CHANGE UP (ref 7–14)
APPEARANCE UR: CLEAR — SIGNIFICANT CHANGE UP
AST SERPL-CCNC: 19 U/L — SIGNIFICANT CHANGE UP (ref 0–41)
BACTERIA # UR AUTO: NEGATIVE /HPF — SIGNIFICANT CHANGE UP
BASOPHILS # BLD AUTO: 0.01 K/UL — SIGNIFICANT CHANGE UP (ref 0–0.2)
BASOPHILS NFR BLD AUTO: 0.2 % — SIGNIFICANT CHANGE UP (ref 0–1)
BILIRUB SERPL-MCNC: 0.2 MG/DL — SIGNIFICANT CHANGE UP (ref 0.2–1.2)
BILIRUB UR-MCNC: NEGATIVE — SIGNIFICANT CHANGE UP
BUN SERPL-MCNC: 48 MG/DL — HIGH (ref 10–20)
CALCIUM SERPL-MCNC: 7.8 MG/DL — LOW (ref 8.4–10.5)
CAST: 0 /LPF — SIGNIFICANT CHANGE UP (ref 0–4)
CHLORIDE SERPL-SCNC: 100 MMOL/L — SIGNIFICANT CHANGE UP (ref 98–110)
CHOLEST SERPL-MCNC: 156 MG/DL — SIGNIFICANT CHANGE UP
CO2 SERPL-SCNC: 23 MMOL/L — SIGNIFICANT CHANGE UP (ref 17–32)
COLOR SPEC: YELLOW — SIGNIFICANT CHANGE UP
CREAT ?TM UR-MCNC: 23 MG/DL — SIGNIFICANT CHANGE UP
CREAT SERPL-MCNC: 2.2 MG/DL — HIGH (ref 0.7–1.5)
DIFF PNL FLD: NEGATIVE — SIGNIFICANT CHANGE UP
EGFR: 24 ML/MIN/1.73M2 — LOW
EOSINOPHIL # BLD AUTO: 0.11 K/UL — SIGNIFICANT CHANGE UP (ref 0–0.7)
EOSINOPHIL NFR BLD AUTO: 2.5 % — SIGNIFICANT CHANGE UP (ref 0–8)
ESTIMATED AVERAGE GLUCOSE: 100 MG/DL — SIGNIFICANT CHANGE UP (ref 68–114)
GLUCOSE SERPL-MCNC: 89 MG/DL — SIGNIFICANT CHANGE UP (ref 70–99)
GLUCOSE UR QL: NEGATIVE MG/DL — SIGNIFICANT CHANGE UP
HCT VFR BLD CALC: 29.6 % — LOW (ref 37–47)
HDLC SERPL-MCNC: 69 MG/DL — SIGNIFICANT CHANGE UP
HGB BLD-MCNC: 9.8 G/DL — LOW (ref 12–16)
IMM GRANULOCYTES NFR BLD AUTO: 0.5 % — HIGH (ref 0.1–0.3)
KETONES UR-MCNC: NEGATIVE MG/DL — SIGNIFICANT CHANGE UP
LEUKOCYTE ESTERASE UR-ACNC: NEGATIVE — SIGNIFICANT CHANGE UP
LIPID PNL WITH DIRECT LDL SERPL: 80 MG/DL — SIGNIFICANT CHANGE UP
LYMPHOCYTES # BLD AUTO: 1.05 K/UL — LOW (ref 1.2–3.4)
LYMPHOCYTES # BLD AUTO: 23.9 % — SIGNIFICANT CHANGE UP (ref 20.5–51.1)
MAGNESIUM SERPL-MCNC: 1.7 MG/DL — LOW (ref 1.8–2.4)
MCHC RBC-ENTMCNC: 31.5 PG — HIGH (ref 27–31)
MCHC RBC-ENTMCNC: 33.1 G/DL — SIGNIFICANT CHANGE UP (ref 32–37)
MCV RBC AUTO: 95.2 FL — SIGNIFICANT CHANGE UP (ref 81–99)
MONOCYTES # BLD AUTO: 0.55 K/UL — SIGNIFICANT CHANGE UP (ref 0.1–0.6)
MONOCYTES NFR BLD AUTO: 12.5 % — HIGH (ref 1.7–9.3)
NEUTROPHILS # BLD AUTO: 2.66 K/UL — SIGNIFICANT CHANGE UP (ref 1.4–6.5)
NEUTROPHILS NFR BLD AUTO: 60.4 % — SIGNIFICANT CHANGE UP (ref 42.2–75.2)
NITRITE UR-MCNC: NEGATIVE — SIGNIFICANT CHANGE UP
NON HDL CHOLESTEROL: 87 MG/DL — SIGNIFICANT CHANGE UP
NRBC # BLD: 0 /100 WBCS — SIGNIFICANT CHANGE UP (ref 0–0)
PH UR: 6.5 — SIGNIFICANT CHANGE UP (ref 5–8)
PLATELET # BLD AUTO: 123 K/UL — LOW (ref 130–400)
PMV BLD: 9.9 FL — SIGNIFICANT CHANGE UP (ref 7.4–10.4)
POTASSIUM SERPL-MCNC: 4.8 MMOL/L — SIGNIFICANT CHANGE UP (ref 3.5–5)
POTASSIUM SERPL-SCNC: 4.8 MMOL/L — SIGNIFICANT CHANGE UP (ref 3.5–5)
PROT ?TM UR-MCNC: 101 MG/DLG/24H — SIGNIFICANT CHANGE UP
PROT SERPL-MCNC: 4.9 G/DL — LOW (ref 6–8)
PROT UR-MCNC: 100 MG/DL
PROT/CREAT UR-RTO: 4.4 RATIO — HIGH (ref 0–0.2)
RBC # BLD: 3.11 M/UL — LOW (ref 4.2–5.4)
RBC # FLD: 14.7 % — HIGH (ref 11.5–14.5)
RBC CASTS # UR COMP ASSIST: 0 /HPF — SIGNIFICANT CHANGE UP (ref 0–4)
SODIUM SERPL-SCNC: 134 MMOL/L — LOW (ref 135–146)
SP GR SPEC: 1.01 — SIGNIFICANT CHANGE UP (ref 1–1.03)
SQUAMOUS # UR AUTO: 0 /HPF — SIGNIFICANT CHANGE UP (ref 0–5)
TRIGL SERPL-MCNC: 36 MG/DL — SIGNIFICANT CHANGE UP
UROBILINOGEN FLD QL: 0.2 MG/DL — SIGNIFICANT CHANGE UP (ref 0.2–1)
WBC # BLD: 4.4 K/UL — LOW (ref 4.8–10.8)
WBC # FLD AUTO: 4.4 K/UL — LOW (ref 4.8–10.8)
WBC UR QL: 0 /HPF — SIGNIFICANT CHANGE UP (ref 0–5)

## 2024-05-20 PROCEDURE — 99233 SBSQ HOSP IP/OBS HIGH 50: CPT

## 2024-05-20 PROCEDURE — 99223 1ST HOSP IP/OBS HIGH 75: CPT

## 2024-05-20 RX ORDER — LABETALOL HCL 100 MG
1 TABLET ORAL
Refills: 0 | DISCHARGE

## 2024-05-20 RX ORDER — HYDRALAZINE HCL 50 MG
25 TABLET ORAL EVERY 8 HOURS
Refills: 0 | Status: DISCONTINUED | OUTPATIENT
Start: 2024-05-20 | End: 2024-05-24

## 2024-05-20 RX ORDER — LABETALOL HCL 100 MG
1 TABLET ORAL
Qty: 60 | Refills: 0
Start: 2024-05-20 | End: 2024-06-18

## 2024-05-20 RX ORDER — HYDRALAZINE HCL 50 MG
10 TABLET ORAL ONCE
Refills: 0 | Status: COMPLETED | OUTPATIENT
Start: 2024-05-20 | End: 2024-05-20

## 2024-05-20 RX ORDER — MAGNESIUM SULFATE 500 MG/ML
2 VIAL (ML) INJECTION ONCE
Refills: 0 | Status: COMPLETED | OUTPATIENT
Start: 2024-05-20 | End: 2024-05-20

## 2024-05-20 RX ORDER — IBUPROFEN 200 MG
400 TABLET ORAL ONCE
Refills: 0 | Status: COMPLETED | OUTPATIENT
Start: 2024-05-20 | End: 2024-05-20

## 2024-05-20 RX ORDER — HYDRALAZINE HCL 50 MG
50 TABLET ORAL ONCE
Refills: 0 | Status: COMPLETED | OUTPATIENT
Start: 2024-05-20 | End: 2024-05-20

## 2024-05-20 RX ADMIN — Medication 10 MILLIGRAM(S): at 01:10

## 2024-05-20 RX ADMIN — LOSARTAN POTASSIUM 25 MILLIGRAM(S): 100 TABLET, FILM COATED ORAL at 05:52

## 2024-05-20 RX ADMIN — Medication 300 MILLIGRAM(S): at 17:27

## 2024-05-20 RX ADMIN — Medication 650 MILLIGRAM(S): at 23:44

## 2024-05-20 RX ADMIN — PANTOPRAZOLE SODIUM 40 MILLIGRAM(S): 20 TABLET, DELAYED RELEASE ORAL at 05:51

## 2024-05-20 RX ADMIN — Medication 400 MILLIGRAM(S): at 02:20

## 2024-05-20 RX ADMIN — Medication 10 MILLIGRAM(S): at 05:51

## 2024-05-20 RX ADMIN — Medication 3 MILLIGRAM(S): at 02:09

## 2024-05-20 RX ADMIN — Medication 650 MILLIGRAM(S): at 23:08

## 2024-05-20 RX ADMIN — Medication 81 MILLIGRAM(S): at 11:17

## 2024-05-20 RX ADMIN — Medication 650 MILLIGRAM(S): at 05:51

## 2024-05-20 RX ADMIN — Medication 25 GRAM(S): at 16:00

## 2024-05-20 RX ADMIN — Medication 50 MILLIGRAM(S): at 20:01

## 2024-05-20 RX ADMIN — Medication 25 MILLIGRAM(S): at 21:26

## 2024-05-20 RX ADMIN — Medication 20 MILLIGRAM(S): at 00:06

## 2024-05-20 RX ADMIN — Medication 300 MILLIGRAM(S): at 05:52

## 2024-05-20 NOTE — DISCHARGE NOTE PROVIDER - NSDCMRMEDTOKEN_GEN_ALL_CORE_FT
Enbrel 25 mg/0.5 mL subcutaneous solution: 25 milligram(s) subcutaneously every 3 weeks  labetalol 300 mg oral tablet: 1 tab(s) orally every 12 hours  losartan 25 mg oral tablet: 1 tab(s) orally once a day  omeprazole 20 mg oral delayed release capsule: 1 cap(s) orally  predniSONE 10 mg oral tablet: 1 tab(s) orally once a day  Prolia 60 mg/mL subcutaneous solution: 60 milligram(s) subcutaneously every 6 months   Enbrel 25 mg/0.5 mL subcutaneous solution: 25 milligram(s) subcutaneously every 3 weeks  hydrALAZINE 25 mg oral tablet: 1 tab(s) orally every 8 hours  isosorbide dinitrate 10 mg oral tablet: 1 tab(s) orally 3 times a day  labetalol 300 mg oral tablet: 1 tab(s) orally every 12 hours  losartan 25 mg oral tablet: 1 tab(s) orally once a day  omeprazole 20 mg oral delayed release capsule: 1 cap(s) orally  predniSONE 10 mg oral tablet: 1 tab(s) orally once a day  Prolia 60 mg/mL subcutaneous solution: 60 milligram(s) subcutaneously every 6 months   Enbrel 25 mg/0.5 mL subcutaneous solution: 25 milligram(s) subcutaneously every 3 weeks  hydrALAZINE 25 mg oral tablet: 1 tab(s) orally every 8 hours  isosorbide dinitrate 10 mg oral tablet: 1 tab(s) orally 3 times a day  losartan 25 mg oral tablet: 1 tab(s) orally once a day  omeprazole 20 mg oral delayed release capsule: 1 cap(s) orally  predniSONE 10 mg oral tablet: 1 tab(s) orally once a day  Prolia 60 mg/mL subcutaneous solution: 60 milligram(s) subcutaneously every 6 months   aspirin 81 mg oral tablet, chewable: 1 tab(s) orally once a day  calcium carbonate 1250 mg (500 mg elemental calcium) oral tablet: 1 tab(s) orally 2 times a day  Enbrel 25 mg/0.5 mL subcutaneous solution: 25 milligram(s) subcutaneously every 3 weeks  hydrALAZINE 25 mg oral tablet: 1 tab(s) orally every 8 hours  isosorbide dinitrate 10 mg oral tablet: 1 tab(s) orally 3 times a day  labetalol 200 mg oral tablet: 1 tab(s) orally every 8 hours  losartan 25 mg oral tablet: 1 tab(s) orally once a day  omeprazole 20 mg oral delayed release capsule: 1 cap(s) orally  predniSONE 10 mg oral tablet: 1 tab(s) orally once a day  Prolia 60 mg/mL subcutaneous solution: 60 milligram(s) subcutaneously every 6 months  Vitamin D3 25 mcg (1000 intl units) oral tablet: 1 tab(s) orally once a day

## 2024-05-20 NOTE — DISCHARGE NOTE NURSING/CASE MANAGEMENT/SOCIAL WORK - PATIENT PORTAL LINK FT
You can access the FollowMyHealth Patient Portal offered by [facility] by registering at the following website: [email or web address]. By joining [facility]’s FollowMyHealth portal, you will also be able to view your health information using other applications (apps) compatible with our system.

## 2024-05-20 NOTE — DISCHARGE NOTE PROVIDER - CARE PROVIDER_API CALL
Zia Rodriguez  Nephrology  [address] [place] [address]  Phone: [phone]  Fax: [phone]  Follow Up Time: 1 month    Rakan Luis  Cardiology  [address], [address]  [place] [address]  Phone: [phone]  Fax: [phone]  Follow Up Time: 1 month    Juan Alberto Wilkinson  Internal Medicine  [address] [place] [address]  Phone: [phone]  Fax: [phone]  Follow Up Time: 1 month

## 2024-05-20 NOTE — DISCHARGE NOTE PROVIDER - ATTENDING DISCHARGE PHYSICAL EXAMINATION:
GENERAL: NAD, lying in bed comfortably  NECK: Supple, No JVD  CHEST/LUNG: Clear to auscultation bilaterally; No rales, rhonchi, wheezing, or rubs. Unlabored respirations  HEART: Regular rate and rhythm; No murmurs, rubs, or gallops  ABDOMEN: BSx4; Soft, nontender, nondistended  EXTREMITIES:  2+ Peripheral Pulses, brisk capillary refill. No clubbing, cyanosis, or edema  NERVOUS SYSTEM:  A&Ox3, no focal deficits   SKIN: No rashes or lesions

## 2024-05-20 NOTE — DISCHARGE NOTE NURSING/CASE MANAGEMENT/SOCIAL WORK - NSDCPEFALRISK_GEN_ALL_CORE
For information on Fall & Injury Prevention, visit: [email or web address] OR  [email or web address] OR  https://www.cdc.gov/steadi/patient.html

## 2024-05-20 NOTE — DISCHARGE NOTE PROVIDER - PROVIDER TOKENS
PROVIDER:[TOKEN:[[id number]],FOLLOWUP:[1 month]],PROVIDER:[TOKEN:[[id number]],FOLLOWUP:[1 month]],PROVIDER:[TOKEN:[[id number]],FOLLOWUP:[1 month]]

## 2024-05-20 NOTE — DISCHARGE NOTE PROVIDER - NSDCCPCAREPLAN_GEN_ALL_CORE_FT
PRINCIPAL DISCHARGE DIAGNOSIS  Diagnosis: Hypertensive emergency  Assessment and Plan of Treatment: You came to the hospital because your blood pressure was very high and you were noted to have chest pain. You were given medication to help control your blood pressure and now you are stable for discharge. Please continue taking your medications as directed and follow up outpt with a nephrologist, cardiologist and your PCP within 2-4 weeks of discharge.     PRINCIPAL DISCHARGE DIAGNOSIS  Diagnosis: Hypertensive emergency  Assessment and Plan of Treatment: You came to the hospital because your blood pressure was very high and you were noted to have chest pain. You were given medication to help control your blood pressure and now you are stable for discharge. Please continue taking your medications as directed and follow up outpt with a nephrologist, cardiologist and your PCP within 1-2 weeks of discharge.     PRINCIPAL DISCHARGE DIAGNOSIS  Diagnosis: Hypertensive emergency  Assessment and Plan of Treatment: You came to the hospital because your blood pressure was very high and you were noted to have chest pain. You were given medication to help control your blood pressure and now you are stable for discharge. Please continue taking your medications as directed and follow up outpt with a nephrologist, cardiologist and your PCP within 1-2 weeks of discharge.  You were started on two new blood pressure medications - please continue taking as directed. You were also started on calcium chloride because your calcium was noted to be low.

## 2024-05-20 NOTE — DISCHARGE NOTE PROVIDER - HOSPITAL COURSE
68-year-old female with a past medical history of hypertension, Osteoporosis, DVT not on AC s/p IVC filter (still present), Rheumatoid arthritis, SLE on Prednisone/Enbrel who presents to the ED for evaluation of refractory HTN.  Per family, patient has been having elevated blood pressure since 5 days ago; baseline blood pressure is 150 systolic, but blood pressure has been in the 200s systolic.   Also endorses dizziness that started earlier today; intermittent and only occurs with movement.    Also endorses chest pain that started earlier today; pain is in the middle of the chest, nonradiating, nonexertional, dull, and there is no alleviating worsening.    Also endorses shortness of breath along with her symptoms.   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   #Frequent Sleep Apneas at night - high STOP/BANG score   - currently -140s - controlled - likely dc today  - BP uncontrol mainly in mornings and during nighttime   - Telemetry   - f/u OP with pulmonary for sleep study     #Cardiomegaly   #Mild volume overload with JVD present   #NSTEMI   #Likely concentric heart failure in the setting of chronic HTN   - control BP  - TTE - she did a TTE at [facility] and recently and showed  EF 75% - septal LVH - G1DD - hyperdynamic LVSF/no wall motion abn - mild valvular abn - echo here with EF of 45-50%  - c/w lasix 20   - c/w home meds     #Rheumatoid arthritis on prednisone   #HX of DVT s/p IVC filter   #Osteoporosis   prelim VA doppler showed DVTs   - A1c/lipid profile   - c/w home meds     #DVT - IVC filter  #GI - pantoprazole   #Diet - DASH  #Activity - IAT   #Code - Full code   #Disposition - IP 68-year-old female with a past medical history of hypertension, Osteoporosis, DVT not on AC s/p IVC filter (still present), Rheumatoid arthritis, SLE on Prednisone/Enbrel who presents to the ED for evaluation of refractory HTN.  Per family, patient has been having elevated blood pressure since 5 days ago; baseline blood pressure is 150 systolic, but blood pressure has been in the 200s systolic.   Also endorses dizziness that started earlier today; intermittent and only occurs with movement.    Also endorses chest pain that started earlier today; pain is in the middle of the chest, nonradiating, nonexertional, dull, and there is no alleviating worsening.    Also endorses shortness of breath along with her symptoms.   She is snoring at night and have a lot of apneas   She follows with a cardiologist at 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 and recently and showed  EF 75% - septal LVH - G1DD - hyperdynamic LVSF/no wall motion abn - mild valvular abn - echo here with EF of 45-50%  - received 2 doses IV Lasix while inpt   - c/w lasix 20   - c/w home meds     #Rheumatoid arthritis on prednisone   #HX of DVT s/p IVC filter   #Osteoporosis   Duplex with chronic DVT b/l   - c/w home meds     #DVT - IVC filter   68-year-old female with a past medical history of hypertension, Osteoporosis, DVT not on AC s/p IVC filter (still present), Rheumatoid arthritis, SLE on Prednisone/Enbrel who presents to the ED for evaluation of refractory HTN.  Per family, patient has been having elevated blood pressure since 5 days ago; baseline blood pressure is 150 systolic, but blood pressure has been in the 200s systolic.   Also endorses dizziness that started earlier today; intermittent and only occurs with movement.    Also endorses chest pain that started earlier today; pain is in the middle of the chest, non-radiating, nonexertional, dull, and there is no alleviating worsening.    Also endorses shortness of breath along with her symptoms.   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   #Frequent Sleep Apneas at night - high STOP/BANG score   - currently -140s , but runs 200s in AM - losartan switched to night time  - likely dc today  - monitor BP for control for full 24 hours   - 2/2 htn workup sent   - cardio consult noted - will start hydralazine and isosorbide for additional BP control, agree with 2/2 htn workup, needs further hx regarding amyloidosis   - consider consult Pulm and f/u OP with pulmonary for sleep study  - stress test normal    #HFmrEF  #Mild volume overload with JVD present   #NSTEMI   - CE stable   - TTE - she did a TTE at [facility] and recently and showed  EF 75% - septal LVH - G1DD - hyperdynamic LVSF/no wall motion abn - mild valvular abn   - TTE here with EF 45-50 % , BNP 12k   - s/p IV Lasix dose x2   - Cardio consult for new heart failure, hx of amyloidosis  - advise obtaining hx regarding amyloidosis, and Stress Test   - c/w home lasix 20   - c/w home meds     #Rheumatoid arthritis on prednisone   #HX of DVT s/p IVC filter   #Osteoporosis   - Duplex with chronic dvt b/l LE   - A1c/lipid profile - both well controlled   - c/w home meds     #DVT - IVC filter  #Code - Full code       68-year-old female with a complex medical history including hypertension, osteoporosis, DVT status post-IVC filter placement, rheumatoid arthritis, and systemic lupus erythematosus (SLE) on Prednisone/Enbrel, presented to the emergency department (ED) for refractory hypertension, dizziness, chest pain, and shortness of breath. Her blood pressure had been persistently elevated, with readings in the 200s systolic, accompanied by symptoms suggestive of volume overload and cardiac compromise. Initial evaluation revealed elevated troponins, anemia, elevated BNP, and acute kidney injury.     Her management included administration of diuretics (Lasix), calcium gluconate, potassium binders (Lokelma), insulin, beta-blockers (labetalol), and antiplatelet therapy (aspirin). Imaging studies showed cardiomegaly and signs of DVT in the lower extremities. She was diagnosed with a hypertensive emergency, CKD secondary to amyloid disease, frequent sleep apneas, and HFmrEF.    Cardiology consultation was obtained, and the patient was started on additional antihypertensive agents (hydralazine, isosorbide) to optimize blood pressure control. Plans for further workup included evaluating her cardiac function, assessing for amyloidosis, and consideration for a sleep study. Despite conflicting echocardiography results, indicating an EF drop to 45-50% from 75%, the patient responded well to diuretic therapy and was stabilized for discharge. She was instructed to continue home medications and follow up closely with cardiology, pulmonology, and rheumatology for ongoing management of her complex medical conditions.    Furthermore, her history of rheumatoid arthritis, osteoporosis, and previous DVT with an IVC filter in place were noted, with recommendations for continuation of current medications and appropriate surveillance. Given her complex medical history and the potential for further cardiovascular events, she was maintained as a full code. Discharge planning included education on medication management, lifestyle modifications, and close outpatient follow-up to ensure ongoing monitoring and optimization of her health.

## 2024-05-20 NOTE — CONSULT NOTE ADULT - ASSESSMENT
68-year-old female with a past medical history of hypertension, Osteoporosis, DVT not on AC s/p IVC filter (still present), Rheumatoid arthritis, SLE on Prednisone/Enbrel who presents to the ED for evaluation of refractory HTN.Pt was admitted for HT emergency. BP and symptoms controlled with medical management by primary team. TTE obtained reveals mildly reduced EF(45-50%). Cardiology team was consulted to evaluate for reduced EF and secondary causes of HT. She is being evaluated at [facility] for shoulder repair after dislocation recently. Pt denied chest pain, shortness of breath, palpitations and dizziness.       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   - obtain Renal artery duplex, TSH/T4, SPEP with IF, UPEP with IF   - obtain her medical records to look for type of amyloidosis and cardiac involvement.   - OP with pulmonary for sleep study        68-year-old female with a past medical history of hypertension, Osteoporosis, DVT not on AC s/p IVC filter (still present), Rheumatoid arthritis, SLE on Prednisone/Enbrel who presents to the ED for evaluation of refractory HTN.Pt was admitted for HT emergency. BP and symptoms controlled with medical management by primary team. TTE obtained reveals mildly reduced EF(45-50%). Cardiology team was consulted to evaluate for reduced EF and secondary causes of HT. She is being evaluated at [facility] for shoulder repair after dislocation recently. Pt denied chest pain, shortness of breath, palpitations and dizziness.       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 ? lupus nephritis   #HX of DVT s/p IVC filter       RECOMMENDATIONS   - currently SBP 150s , but was 200s intermittently    - Obtain Pharmacological nuclear stress test for ischemia work-up  - c/w home meds - labetalol 300mg BID and losartan 25mg QD  - Add Hydralazine 25mg TID, may add isosorbide dinitrate 10mg tid if BP allows ----> titrate up doses to reach BP target.   - Obtain Renal artery duplex, TSH/T4, SPEP with IF, UPEP with IF   - Obtain Renin/aldosterone ratio, renin activity and serum aldosterone lvl  - Obtain her medical records to look for type of amyloidosis and cardiac involvement.   - OP with pulmonary for sleep study        68-year-old female with a past medical history of hypertension, Osteoporosis, DVT not on AC s/p IVC filter (still present), Rheumatoid arthritis, SLE on Prednisone/Enbrel who presents to the ED for evaluation of refractory HTN.Pt was admitted for HT emergency. BP and symptoms controlled with medical management by primary team. TTE obtained reveals mildly reduced EF(45-50%). Cardiology team was consulted to evaluate for reduced EF and secondary causes of HT. She is being evaluated at [facility] for shoulder repair after dislocation recently. Pt denied chest pain, shortness of breath, palpitations and dizziness.       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 ? lupus nephritis   #HX of DVT s/p IVC filter       RECOMMENDATIONS   - currently SBP 150s , but was 200s intermittently    - Obtain Pharmacological nuclear stress test for ischemia work-up  - c/w home meds - labetalol 300mg BID and losartan 25mg QD  - Add Hydralazine 25mg TID, may add isosorbide dinitrate 10mg tid if BP allows ----> titrate up doses to reach BP target.   - Obtain Renal artery duplex, TSH/T4, SPEP with IF, UPEP with IF   - Obtain Renin/aldosterone ratio, renin activity and serum aldosterone lvl  - Obtain her medical records to look for type of amyloidosis and cardiac involvement.   - OP with pulmonary for sleep study   - Updated family on plan

## 2024-05-20 NOTE — DISCHARGE NOTE PROVIDER - CARE PROVIDERS DIRECT ADDRESSES
[email or web address]

## 2024-05-20 NOTE — PROGRESS NOTE ADULT - SUBJECTIVE AND OBJECTIVE BOX
24H events:    Patient is a 68y old Female who presents with a chief complaint of   Primary diagnosis of Hyperkalemia    Today is hospital day 1d. This morning patient was seen and examined at bedside, resting comfortably in bed.    No acute or major events overnight.      PAST MEDICAL & SURGICAL HISTORY    SOCIAL HISTORY:  Social History:    ALLERGIES:  No Known Allergies    MEDICATIONS:  STANDING MEDICATIONS  aspirin  chewable 81 milliGRAM(s) Oral daily  labetalol 300 milliGRAM(s) Oral every 12 hours  losartan 25 milliGRAM(s) Oral daily  pantoprazole    Tablet 40 milliGRAM(s) Oral before breakfast  predniSONE   Tablet 10 milliGRAM(s) Oral daily    PRN MEDICATIONS  acetaminophen     Tablet .. 650 milliGRAM(s) Oral every 6 hours PRN  aluminum hydroxide/magnesium hydroxide/simethicone Suspension 30 milliLiter(s) Oral every 4 hours PRN  melatonin 3 milliGRAM(s) Oral at bedtime PRN  ondansetron Injectable 4 milliGRAM(s) IV Push every 8 hours PRN    VITALS:   T(F): 97.8  HR: 72  BP: 150/75  RR: 18  SpO2: 98%    PHYSICAL EXAM:  GENERAL:   ( x ) NAD, lying in bed comfortably     (  ) obtunded     (  ) lethargic     (  ) somnolent    HEAD:   ( x ) Atraumatic     (  ) hematoma     (  ) laceration (specify location:       )     HEART:  Rate -->     ( x ) normal rate     (  ) bradycardic     (  ) tachycardic  Rhythm -->     ( x ) regular     (  ) regularly irregular     (  ) irregularly irregular  Murmurs -->     ( x ) normal s1s2     (  ) systolic murmur     (  ) diastolic murmur     (  ) continuous murmur      (  ) S3 present     (  ) S4 present    LUNGS:   ( x )Unlabored respirations     (  ) tachypnea  ( x ) B/L air entry     (  ) decreased breath sounds in:  (location     )    (  ) no adventitious sound     (  ) crackles     (  ) wheezing      (  ) rhonchi      (specify location:       )  (  ) chest wall tenderness (specify location:       )    ABDOMEN:   ( x ) Soft     (  ) tense   |   (  ) nondistended     (  ) distended   |   (  ) +BS     (  ) hypoactive bowel sounds     (  ) hyperactive bowel sounds  (  ) nontender     (  ) RUQ tenderness     (  ) RLQ tenderness     (  ) LLQ tenderness     (  ) epigastric tenderness     (  ) diffuse tenderness  (  ) Splenomegaly      (  ) Hepatomegaly      (  ) Jaundice     (  ) ecchymosis     EXTREMITIES:  ( x ) Normal     (  ) Rash     (  ) ecchymosis     (  ) varicose veins      (  ) pitting edema     (  ) non-pitting edema   (  ) ulceration     (  ) gangrene:     (location:     )    NERVOUS SYSTEM:    ( x ) A&Ox3     (  ) confused     (  ) lethargic  CN II-XII:     (  ) Intact     (  ) deficits found     (Specify:     )   Upper extremities:     (  ) no sensorimotor deficits     (  ) weakness     (  ) loss of proprioception/vibration     (  ) loss of touch/temperature (specify:    )  Lower extremities:     (  ) no sensorimotor deficits     (  ) weakness     (  ) loss of proprioception/vibration     (  ) loss of touch/temperature (specify:    )    (  ) Indwelling Gonzales Catheter:   Date insterted:    Reason (  ) Critical illness     (  ) urinary retention    (  ) Accurate Ins/Outs Monitoring     (  ) CMO patient    (  ) Central Line:   Date inserted:  Location: (  ) Right IJ     (  ) Left IJ     (  ) Right Fem     (  ) Left Fem    (  ) SPC        (  ) pigtail       (  ) PEG tube       (  ) colostomy       (  ) jejunostomy  (  ) U-Dall    LABS:                        9.8    4.40  )-----------( 123      ( 20 May 2024 05:51 )             29.6     05-20    134<L>  |  100  |  48<H>  ----------------------------<  89  4.8   |  23  |  2.2<H>    Ca    7.8<L>      20 May 2024 05:51  Mg     1.7     05-20    TPro  4.9<L>  /  Alb  3.0<L>  /  TBili  0.2  /  DBili  x   /  AST  19  /  ALT  9   /  AlkPhos  61  05-20    PT/INR - ( 18 May 2024 22:19 )   PT: 10.40 sec;   INR: 0.91 ratio         PTT - ( 18 May 2024 22:19 )  PTT:19.9 sec  Urinalysis Basic - ( 20 May 2024 05:51 )    Color: x / Appearance: x / SG: x / pH: x  Gluc: 89 mg/dL / Ketone: x  / Bili: x / Urobili: x   Blood: x / Protein: x / Nitrite: x   Leuk Esterase: x / RBC: x / WBC x   Sq Epi: x / Non Sq Epi: x / Bacteria: x                RADIOLOGY:

## 2024-05-20 NOTE — CONSULT NOTE ADULT - SUBJECTIVE AND OBJECTIVE BOX
HPI  68-year-old female with a past medical history of hypertension, Osteoporosis, DVT not on AC s/p IVC filter (still present), Rheumatoid arthritis, SLE on Prednisone/Enbrel who presents to the ED for evaluation of refractory HTN.  Per family, patient has been having elevated blood pressure since 5 days ago; baseline blood pressure is 150 systolic, but blood pressure has been in the 200s systolic.   Also endorses dizziness that started earlier today; intermittent and only occurs with movement.    Also endorses chest pain that started earlier today; pain is in the middle of the chest, nonradiating, nonexertional, dull, and there is no alleviating worsening.    Also endorses shortness of breath along with her symptoms.   She is snoring at night and have a lot of apneas   She follows with a cardiologist at [facility] HTN  -> 180s   Labs trops 62>56 - BNP 12K - Hb 9.4 - creatinine 2.1 - VBG noted   EKG NSR ian   Imaging: CXR no pulm abnormalities - cardiomegaly   VA doppler LE prelim shows signs of DVT   CT angio chest neg for PE - cardiomegaly - compression fracture  In the ED, given lasix 40 once - calcium gluconate - lokelma - D5/regular insulin - labetalol 300 BID - aspirin 325 (19 May 2024 09:27)      CARDIOLOGY CONSULT  Pt was admitted for HT emergency. BP and symptoms controlled with medical management by primary team. TTE obtained reveals mildly reduced EF(45-50%). Cardiology team was consulted to evaluate for reduced EF and secondary causes of HT.   She is being evaluated at [facility] for shoulder repair after dislocation recently. Pt denied chest pain, shortness of breath, palpitations and dizziness.     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 650 milliGRAM(s) Oral every 6 hours PRN Temp greater or equal to 38C (100.4F), Mild Pain (1 - 3)  aluminum hydroxide/magnesium hydroxide/simethicone Suspension 30 milliLiter(s) Oral every 4 hours PRN Dyspepsia  melatonin 3 milliGRAM(s) Oral at bedtime PRN Insomnia  ondansetron Injectable 4 milliGRAM(s) IV Push every 8 hours PRN Nausea and/or Vomiting      HOME MEDICATIONS  Home Medications:  Enbrel 25 mg/0.5 mL subcutaneous solution: 25 milligram(s) subcutaneously every 3 weeks (19 May 2024 11:10)  losartan 25 mg oral tablet: 1 tab(s) orally once a day (19 May 2024 11:10)  omeprazole 20 mg oral delayed release capsule: 1 cap(s) orally (19 May 2024 11:10)  predniSONE 10 mg oral tablet: 1 tab(s) orally once a day (19 May 2024 11:10)  Prolia 60 mg/mL subcutaneous solution: 60 milligram(s) subcutaneously every 6 months (19 May 2024 11:12)      VITALS   T(F): 97.8 (05-20 @ 05:15), Max: 99 (05-18 @ 19:59)  HR: 81 (05-20 @ 12:31) (66 - 83)  BP: 159/78 (05-20 @ 12:31) (132/82 - 240/109)  BP(mean): --  RR: 18 (05-20 @ 05:15) (16 - 18)  SpO2: 98% (05-20 @ 02:06) (98% - 99%)    I&O's Summary    20 May 2024 07:01  -  20 May 2024 17:55  --------------------------------------------------------  IN: 450 mL / OUT: 200 mL / NET: 250 mL        REVIEW OF SYSTEMS  CONSTITUTIONAL: No weakness, fevers or chills  EYES: No visual changes  ENT: No vertigo or throat pain   NECK: No pain or stiffness  RESPIRATORY: No cough, wheezing, hemoptysis; No shortness of breath  CARDIOVASCULAR: No chest pain or palpitations  GASTROINTESTINAL: No abdominal or epigastric pain. No nausea, vomiting, or hematemesis; No diarrhea or constipation. No melena or hematochezia.  GENITOURINARY: No dysuria, frequency or hematuria  NEUROLOGICAL: No numbness or weakness  SKIN: No itching, no rashes  MSK: No pain    PHYSICAL EXAM  NEURO: patient is awake , alert and oriented  GEN: Not in acute distress  NECK: no thyroid enlargement, no JVD  LUNGS: Clear to auscultation bilaterally   CARDIOVASCULAR: S1/S2 present, RRR , no murmurs or rubs, no carotid bruits,  + PP bilaterally  ABD: Soft, non-tender, non-distended, +BS  EXT: No LOVELY  SKIN: Intact    LABS:                        9.8    4.40  )-----------( 123      ( 20 May 2024 05:51 )             29.6     05-20    134<L>  |  100  |  48<H>  ----------------------------<  89  4.8   |  23  |  2.2<H>    Ca    7.8<L>      20 May 2024 05:51  Mg     1.7     05-20    TPro  4.9<L>  /  Alb  3.0<L>  /  TBili  0.2  /  DBili  x   /  AST  19  /  ALT  9   /  AlkPhos  61  05-20  PT/INR - ( 18 May 2024 22:19 )   PT: 10.40 sec;   INR: 0.91 ratio    PTT - ( 18 May 2024 22:19 )  PTT:19.9 sec  Trop - 62---->56---->63  Chol 156 LDL -- HDL 69 Trig 36      RADIOLOGY  -CXR: No acute path noted   -TTE: LVEF 45-50%, Mildly decreased global LV systolic. Moderately increased LV basal septal wall thickness; GD1DD    ECG  NSR, no obvious St changes     TELEMETRY EVENTS  No events HPI  68-year-old female with a past medical history of hypertension, Osteoporosis, DVT not on AC s/p IVC filter (still present), Rheumatoid arthritis, SLE on Prednisone/Enbrel who presents to the ED for evaluation of refractory HTN.  Per family, patient has been having elevated blood pressure since 5 days ago; baseline blood pressure is 150 systolic, but blood pressure has been in the 200s systolic.   Also endorses dizziness that started earlier today; intermittent and only occurs with movement.    Also endorses chest pain that started earlier today; pain is in the middle of the chest, nonradiating, nonexertional, dull, and there is no alleviating worsening.    Also endorses shortness of breath along with her symptoms.   She is snoring at night and have a lot of apneas   She follows with a cardiologist at [facility] HTN  -> 180s   Labs trops 62>56 - BNP 12K - Hb 9.4 - creatinine 2.1 - VBG noted   EKG NSR ian   Imaging: CXR no pulm abnormalities - cardiomegaly   VA doppler LE prelim shows signs of DVT   CT angio chest neg for PE - cardiomegaly - compression fracture  In the ED, given lasix 40 once - calcium gluconate - lokelma - D5/regular insulin - labetalol 300 BID - aspirin 325 (19 May 2024 09:27)      CARDIOLOGY CONSULT  Pt was admitted for HT emergency. BP and symptoms controlled with medical management by primary team. TTE obtained reveals mildly reduced EF(45-50%).She is being evaluated at [facility] for shoulder repair after dislocation recently. Pt denied chest pain, shortness of breath, palpitations and dizziness. Cardiology team was consulted to evaluate for reduced EF and secondary causes of HT.       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 650 milliGRAM(s) Oral every 6 hours PRN Temp greater or equal to 38C (100.4F), Mild Pain (1 - 3)  aluminum hydroxide/magnesium hydroxide/simethicone Suspension 30 milliLiter(s) Oral every 4 hours PRN Dyspepsia  melatonin 3 milliGRAM(s) Oral at bedtime PRN Insomnia  ondansetron Injectable 4 milliGRAM(s) IV Push every 8 hours PRN Nausea and/or Vomiting      HOME MEDICATIONS  Home Medications:  Enbrel 25 mg/0.5 mL subcutaneous solution: 25 milligram(s) subcutaneously every 3 weeks (19 May 2024 11:10)  losartan 25 mg oral tablet: 1 tab(s) orally once a day (19 May 2024 11:10)  omeprazole 20 mg oral delayed release capsule: 1 cap(s) orally (19 May 2024 11:10)  predniSONE 10 mg oral tablet: 1 tab(s) orally once a day (19 May 2024 11:10)  Prolia 60 mg/mL subcutaneous solution: 60 milligram(s) subcutaneously every 6 months (19 May 2024 11:12)      VITALS   T(F): 97.8 (05-20 @ 05:15), Max: 99 (05-18 @ 19:59)  HR: 81 (05-20 @ 12:31) (66 - 83)  BP: 159/78 (05-20 @ 12:31) (132/82 - 240/109)  BP(mean): --  RR: 18 (05-20 @ 05:15) (16 - 18)  SpO2: 98% (05-20 @ 02:06) (98% - 99%)    I&O's Summary    20 May 2024 07:01  -  20 May 2024 17:55  --------------------------------------------------------  IN: 450 mL / OUT: 200 mL / NET: 250 mL        REVIEW OF SYSTEMS  CONSTITUTIONAL: No weakness, fevers or chills  EYES: No visual changes  ENT: No vertigo or throat pain   NECK: No pain or stiffness  RESPIRATORY: No cough, wheezing, hemoptysis; No shortness of breath  CARDIOVASCULAR: No chest pain or palpitations  GASTROINTESTINAL: No abdominal or epigastric pain. No nausea, vomiting, or hematemesis; No diarrhea or constipation. No melena or hematochezia.  GENITOURINARY: No dysuria, frequency or hematuria  NEUROLOGICAL: No numbness or weakness  SKIN: No itching, no rashes  MSK: No pain    PHYSICAL EXAM  NEURO: patient is awake , alert and oriented  GEN: Not in acute distress  NECK: no thyroid enlargement, no JVD  LUNGS: Clear to auscultation bilaterally   CARDIOVASCULAR: S1/S2 present, RRR , no murmurs or rubs, no carotid bruits, + PP bilaterally  ABD: Soft, non-tender, non-distended, +BS  EXT: No LOVELY  SKIN: Intact    LABS:                        9.8    4.40  )-----------( 123      ( 20 May 2024 05:51 )             29.6     05-20    134<L>  |  100  |  48<H>  ----------------------------<  89  4.8   |  23  |  2.2<H>    Ca    7.8<L>      20 May 2024 05:51  Mg     1.7     05-20    TPro  4.9<L>  /  Alb  3.0<L>  /  TBili  0.2  /  DBili  x   /  AST  19  /  ALT  9   /  AlkPhos  61  05-20  PT/INR - ( 18 May 2024 22:19 )   PT: 10.40 sec;   INR: 0.91 ratio    PTT - ( 18 May 2024 22:19 )  PTT:19.9 sec  Trop - 62---->56---->63  Chol 156 LDL -- HDL 69 Trig 36      RADIOLOGY  -CXR: No acute path noted   -TTE: LVEF 45-50%, Mildly decreased global LV systolic. Moderately increased LV basal septal wall thickness; GD1DD    ECG  NSR, no obvious St changes     TELEMETRY EVENTS  No events

## 2024-05-20 NOTE — PROGRESS NOTE ADULT - ASSESSMENT
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   #Frequent Sleep Apneas at night - high STOP/BANG score   - currently -140s - controlled - likely dc today  - BP uncontrol mainly in mornings and during nighttime   - Telemetry   - consider consult Pulm and f/u OP with pulmonary for sleep study     #Cardiomegaly   #Mild volume overload with JVD present   #NSTEMI   #Likely concentric heart failure in the setting of chronic HTN   - trend CE/EKG   - control BP  - TTE - she did a TTE at [facility] and recently and showed  EF 75% - septal LVH - G1DD - hyperdynamic LVSF/no wall motion abn - mild valvular abn   - c/w lasix 20   - c/w home meds   - consider adding HCTZ if still uncontrolled by tomorrow     #Rheumatoid arthritis on prednisone   #HX of DVT s/p IVC filter   #Osteoporosis   prelim VA doppler showed DVTs   - A1c/lipid profile   - c/w home meds     #DVT - IVC filter  #GI - pantoprazole   #Diet - DASH  #Activity - IAT   #Code - Full code   #Disposition - IP    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   #Frequent Sleep Apneas at night - high STOP/BANG score   - currently -140s - controlled - likely dc today  - BP uncontrol mainly in mornings and during nighttime   - Telemetry   - consider consult Pulm and f/u OP with pulmonary for sleep study     #Cardiomegaly   #Mild volume overload with JVD present   #NSTEMI   #Likely concentric heart failure in the setting of chronic HTN   - trend CE/EKG   - control BP  - TTE - she did a TTE at [facility] and recently and showed  EF 75% - septal LVH - G1DD - hyperdynamic LVSF/no wall motion abn - mild valvular abn   - c/w lasix 20   - c/w home meds   - consider adding HCTZ if still uncontrolled by tomorrow     #Rheumatoid arthritis on prednisone   #HX of DVT s/p IVC filter   #Osteoporosis   prelim VA doppler showed DVTs   - A1c/lipid profile   - c/w home meds     #DVT - IVC filter  #GI - pantoprazole   #Diet - DASH  #Activity - IAT   #Code - Full code   #Disposition - IP

## 2024-05-20 NOTE — CONSULT NOTE ADULT - ATTENDING COMMENTS
Patient with hypertensive urgency and new diagnosis of HFmrEF with TTE showing LVEF 45-50%, moderately increase septal wall thickness, G1DD, and LAE. The patient's HFmrEF is most likely due to her uncontrolled hypertensive, but given she cannot tolerate 4 METs and she has risk factors for CAD, much consider CAD as a cause. Additionally, patient with history of amyloid, no other details available.     Recommendations:   - Continue labetalol 300 mg BID and losartan 25 mg daily   - Start hydralazine 25 mg q8h  - If SBP > 140, can start isosorbide 10 mg TID  - May consider changing labetalol as this is not one of the 3 Bb indicated for HF  - Recommend regadenoson stress test for ischemic evaluation of HFmrEF  - Check renin and aldosterone  - Check renal arterial Duplex to rule out renal artery stenosis as a cause of uncontrolled BP  - Obtain history regarding amyloid to assess if she is at risk for cardiac amyloid  - While awaiting amyloid history, can order serum free light chain, serum immunofixation, and urine immunofixation to evaluate for AL amyloid  - Cardiology consult team to follow

## 2024-05-21 LAB
ALBUMIN SERPL ELPH-MCNC: 3.2 G/DL — LOW (ref 3.5–5.2)
ALP SERPL-CCNC: 63 U/L — SIGNIFICANT CHANGE UP (ref 30–115)
ALT FLD-CCNC: 9 U/L — SIGNIFICANT CHANGE UP (ref 0–41)
ANION GAP SERPL CALC-SCNC: 5 MMOL/L — LOW (ref 7–14)
AST SERPL-CCNC: 17 U/L — SIGNIFICANT CHANGE UP (ref 0–41)
BASOPHILS # BLD AUTO: 0.02 K/UL — SIGNIFICANT CHANGE UP (ref 0–0.2)
BASOPHILS NFR BLD AUTO: 0.3 % — SIGNIFICANT CHANGE UP (ref 0–1)
BILIRUB SERPL-MCNC: 0.2 MG/DL — SIGNIFICANT CHANGE UP (ref 0.2–1.2)
BUN SERPL-MCNC: 49 MG/DL — HIGH (ref 10–20)
CALCIUM SERPL-MCNC: 7.4 MG/DL — LOW (ref 8.4–10.5)
CHLORIDE SERPL-SCNC: 99 MMOL/L — SIGNIFICANT CHANGE UP (ref 98–110)
CO2 SERPL-SCNC: 26 MMOL/L — SIGNIFICANT CHANGE UP (ref 17–32)
CREAT SERPL-MCNC: 2.2 MG/DL — HIGH (ref 0.7–1.5)
EGFR: 24 ML/MIN/1.73M2 — LOW
EOSINOPHIL # BLD AUTO: 0.12 K/UL — SIGNIFICANT CHANGE UP (ref 0–0.7)
EOSINOPHIL NFR BLD AUTO: 1.6 % — SIGNIFICANT CHANGE UP (ref 0–8)
GLUCOSE SERPL-MCNC: 90 MG/DL — SIGNIFICANT CHANGE UP (ref 70–99)
HCT VFR BLD CALC: 30.4 % — LOW (ref 37–47)
HGB BLD-MCNC: 10 G/DL — LOW (ref 12–16)
IMM GRANULOCYTES NFR BLD AUTO: 0.4 % — HIGH (ref 0.1–0.3)
LYMPHOCYTES # BLD AUTO: 0.85 K/UL — LOW (ref 1.2–3.4)
LYMPHOCYTES # BLD AUTO: 11.1 % — LOW (ref 20.5–51.1)
MAGNESIUM SERPL-MCNC: 2.2 MG/DL — SIGNIFICANT CHANGE UP (ref 1.8–2.4)
MCHC RBC-ENTMCNC: 31.3 PG — HIGH (ref 27–31)
MCHC RBC-ENTMCNC: 32.9 G/DL — SIGNIFICANT CHANGE UP (ref 32–37)
MCV RBC AUTO: 95 FL — SIGNIFICANT CHANGE UP (ref 81–99)
MONOCYTES # BLD AUTO: 0.56 K/UL — SIGNIFICANT CHANGE UP (ref 0.1–0.6)
MONOCYTES NFR BLD AUTO: 7.3 % — SIGNIFICANT CHANGE UP (ref 1.7–9.3)
NEUTROPHILS # BLD AUTO: 6.09 K/UL — SIGNIFICANT CHANGE UP (ref 1.4–6.5)
NEUTROPHILS NFR BLD AUTO: 79.3 % — HIGH (ref 42.2–75.2)
NRBC # BLD: 0 /100 WBCS — SIGNIFICANT CHANGE UP (ref 0–0)
PLATELET # BLD AUTO: 124 K/UL — LOW (ref 130–400)
PMV BLD: 9.6 FL — SIGNIFICANT CHANGE UP (ref 7.4–10.4)
POTASSIUM SERPL-MCNC: 5 MMOL/L — SIGNIFICANT CHANGE UP (ref 3.5–5)
POTASSIUM SERPL-SCNC: 5 MMOL/L — SIGNIFICANT CHANGE UP (ref 3.5–5)
PROT SERPL-MCNC: 5.1 G/DL — LOW (ref 6–8)
RBC # BLD: 3.2 M/UL — LOW (ref 4.2–5.4)
RBC # FLD: 14.6 % — HIGH (ref 11.5–14.5)
SODIUM SERPL-SCNC: 130 MMOL/L — LOW (ref 135–146)
WBC # BLD: 7.67 K/UL — SIGNIFICANT CHANGE UP (ref 4.8–10.8)
WBC # FLD AUTO: 7.67 K/UL — SIGNIFICANT CHANGE UP (ref 4.8–10.8)

## 2024-05-21 PROCEDURE — 99233 SBSQ HOSP IP/OBS HIGH 50: CPT

## 2024-05-21 PROCEDURE — 93018 CV STRESS TEST I&R ONLY: CPT

## 2024-05-21 PROCEDURE — 93975 VASCULAR STUDY: CPT | Mod: 26

## 2024-05-21 PROCEDURE — 93016 CV STRESS TEST SUPVJ ONLY: CPT

## 2024-05-21 PROCEDURE — 78452 HT MUSCLE IMAGE SPECT MULT: CPT | Mod: 26

## 2024-05-21 RX ORDER — ISOSORBIDE DINITRATE 5 MG/1
10 TABLET ORAL THREE TIMES A DAY
Refills: 0 | Status: DISCONTINUED | OUTPATIENT
Start: 2024-05-21 | End: 2024-05-24

## 2024-05-21 RX ORDER — REGADENOSON 0.08 MG/ML
0.4 INJECTION, SOLUTION INTRAVENOUS ONCE
Refills: 0 | Status: COMPLETED | OUTPATIENT
Start: 2024-05-21 | End: 2024-05-21

## 2024-05-21 RX ADMIN — REGADENOSON 0.4 MILLIGRAM(S): 0.08 INJECTION, SOLUTION INTRAVENOUS at 14:46

## 2024-05-21 RX ADMIN — Medication 300 MILLIGRAM(S): at 05:13

## 2024-05-21 RX ADMIN — Medication 25 MILLIGRAM(S): at 05:10

## 2024-05-21 RX ADMIN — PANTOPRAZOLE SODIUM 40 MILLIGRAM(S): 20 TABLET, DELAYED RELEASE ORAL at 05:15

## 2024-05-21 RX ADMIN — Medication 300 MILLIGRAM(S): at 17:10

## 2024-05-21 RX ADMIN — Medication 10 MILLIGRAM(S): at 05:13

## 2024-05-21 RX ADMIN — Medication 25 MILLIGRAM(S): at 21:46

## 2024-05-21 RX ADMIN — Medication 25 MILLIGRAM(S): at 17:10

## 2024-05-21 RX ADMIN — LOSARTAN POTASSIUM 25 MILLIGRAM(S): 100 TABLET, FILM COATED ORAL at 05:13

## 2024-05-21 RX ADMIN — Medication 81 MILLIGRAM(S): at 11:23

## 2024-05-21 NOTE — PROGRESS NOTE ADULT - SUBJECTIVE AND OBJECTIVE BOX
24H events:    Patient is a 68y old Female who presents with a chief complaint of   Primary diagnosis of Hypertensive emergency      Today is hospital day 2d. This morning patient was seen and examined at bedside, resting comfortably in bed.    No acute or major events overnight.    PAST MEDICAL & SURGICAL HISTORY    SOCIAL HISTORY:  Social History:      ALLERGIES:  No Known Allergies    MEDICATIONS:  STANDING MEDICATIONS  aspirin  chewable 81 milliGRAM(s) Oral daily  hydrALAZINE 25 milliGRAM(s) Oral every 8 hours  isosorbide   dinitrate Tablet (ISORDIL) 10 milliGRAM(s) Oral three times a day  labetalol 300 milliGRAM(s) Oral every 12 hours  losartan 25 milliGRAM(s) Oral daily  pantoprazole    Tablet 40 milliGRAM(s) Oral before breakfast  predniSONE   Tablet 10 milliGRAM(s) Oral daily    PRN MEDICATIONS  acetaminophen     Tablet .. 650 milliGRAM(s) Oral every 6 hours PRN  aluminum hydroxide/magnesium hydroxide/simethicone Suspension 30 milliLiter(s) Oral every 4 hours PRN  melatonin 3 milliGRAM(s) Oral at bedtime PRN  ondansetron Injectable 4 milliGRAM(s) IV Push every 8 hours PRN    VITALS:   T(F): 98  HR: 73  BP: 179/90  RR: 18  SpO2: --    PHYSICAL EXAM:  GENERAL:   ( x ) NAD, lying in bed comfortably     (  ) obtunded     (  ) lethargic     (  ) somnolent    HEAD:   ( x ) Atraumatic     (  ) hematoma     (  ) laceration (specify location:       )     NECK:  (  ) Supple     (  ) neck stiffness     (  ) nuchal rigidity     (  )  no JVD     (  ) JVD present ( -- cm)    HEART:  Rate -->     ( x ) normal rate     (  ) bradycardic     (  ) tachycardic  Rhythm -->     ( x ) regular     (  ) regularly irregular     (  ) irregularly irregular  Murmurs -->     ( x ) normal s1s2     (  ) systolic murmur     (  ) diastolic murmur     (  ) continuous murmur      (  ) S3 present     (  ) S4 present    LUNGS:   ( x )Unlabored respirations     (  ) tachypnea  ( x ) B/L air entry     (  ) decreased breath sounds in:  (location     )    (  ) no adventitious sound     (  ) crackles     (  ) wheezing      (  ) rhonchi      (specify location:       )  (  ) chest wall tenderness (specify location:       )    ABDOMEN:   ( x ) Soft     (  ) tense   |   ( x ) nondistended     (  ) distended   |   (  ) +BS     (  ) hypoactive bowel sounds     (  ) hyperactive bowel sounds  (  ) nontender     (  ) RUQ tenderness     (  ) RLQ tenderness     (  ) LLQ tenderness     (  ) epigastric tenderness     (  ) diffuse tenderness  (  ) Splenomegaly      (  ) Hepatomegaly      (  ) Jaundice     (  ) ecchymosis     EXTREMITIES:  ( x ) Normal     (  ) Rash     (  ) ecchymosis     (  ) varicose veins      (  ) pitting edema     (  ) non-pitting edema   (  ) ulceration     (  ) gangrene:     (location:     )    NERVOUS SYSTEM:    ( x ) A&Ox3     (  ) confused     (  ) lethargic  CN II-XII:     (  ) Intact     (  ) deficits found     (Specify:     )   Upper extremities:     (  ) no sensorimotor deficits     (  ) weakness     (  ) loss of proprioception/vibration     (  ) loss of touch/temperature (specify:    )  Lower extremities:     (  ) no sensorimotor deficits     (  ) weakness     (  ) loss of proprioception/vibration     (  ) loss of touch/temperature (specify:    )    (  ) Indwelling Gonzales Catheter:   Date insterted:    Reason (  ) Critical illness     (  ) urinary retention    (  ) Accurate Ins/Outs Monitoring     (  ) CMO patient    (  ) Central Line:   Date inserted:  Location: (  ) Right IJ     (  ) Left IJ     (  ) Right Fem     (  ) Left Fem    (  ) SPC        (  ) pigtail       (  ) PEG tube       (  ) colostomy       (  ) jejunostomy  (  ) U-Dall    LABS:                        9.8    4.40  )-----------( 123      ( 20 May 2024 05:51 )             29.6     05-20    134<L>  |  100  |  48<H>  ----------------------------<  89  4.8   |  23  |  2.2<H>    Ca    7.8<L>      20 May 2024 05:51  Mg     1.7     05-20    TPro  4.9<L>  /  Alb  3.0<L>  /  TBili  0.2  /  DBili  x   /  AST  19  /  ALT  9   /  AlkPhos  61  05-20      Urinalysis Basic - ( 20 May 2024 14:03 )    Color: Yellow / Appearance: Clear / S.013 / pH: x  Gluc: x / Ketone: Negative mg/dL  / Bili: Negative / Urobili: 0.2 mg/dL   Blood: x / Protein: 100 mg/dL / Nitrite: Negative   Leuk Esterase: Negative / RBC: 0 /HPF / WBC 0 /HPF   Sq Epi: x / Non Sq Epi: 0 /HPF / Bacteria: Negative /HPF                RADIOLOGY:

## 2024-05-21 NOTE — PROGRESS NOTE ADULT - ASSESSMENT
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   #Frequent Sleep Apneas at night - high STOP/BANG score   - SBP high overnight- on labetalol, losartan and hydralazine started as well as imdur   - BP uncontrol mainly in mornings and during nighttime   - cont Telemetry   - monitor BP for control for full 24 hours   - 2/2 htn workup renin, aldosterone, metanephrine, renal arterial doppler f/u    #Cardiomegaly   #Mild volume overload with JVD present   #NSTEMI   #Likely concentric heart failure in the setting of chronic HTN   - CE stable   - TTE - she did a TTE at [facility] and recently and showed  EF 75% - septal LVH - G1DD - hyperdynamic LVSF/no wall motion abn - mild valvular abn   - TTE here with EF 45-50 % , BNP 12k   - s/p IV Lasix dose x2   - Cardio consult for new heart failure, hx of amyloidosis, uncontrolled htn - recs appreciated   - c/w home lasix 20   - c/w home meds     #Rheumatoid arthritis on prednisone   #HX of DVT s/p IVC filter   #Osteoporosis   - Duplex with chronic dvt b/l LE   - A1c/lipid profile - both well controlled   - c/w home meds     #DVT - IVC filter    #Code - Full code     Dispo: f/u 2nd htn workup and monitor bp

## 2024-05-22 LAB
ALBUMIN SERPL ELPH-MCNC: 2.9 G/DL — LOW (ref 3.5–5.2)
ALDOST SERPL-MCNC: 10.8 NG/DL — SIGNIFICANT CHANGE UP
ALDOSTERONE / DIRECT RENIN RATIO RESULT: SIGNIFICANT CHANGE UP RATIO
ALP SERPL-CCNC: 54 U/L — SIGNIFICANT CHANGE UP (ref 30–115)
ALT FLD-CCNC: 7 U/L — SIGNIFICANT CHANGE UP (ref 0–41)
ANION GAP SERPL CALC-SCNC: 9 MMOL/L — SIGNIFICANT CHANGE UP (ref 7–14)
AST SERPL-CCNC: 15 U/L — SIGNIFICANT CHANGE UP (ref 0–41)
BASOPHILS # BLD AUTO: 0.01 K/UL — SIGNIFICANT CHANGE UP (ref 0–0.2)
BASOPHILS NFR BLD AUTO: 0.2 % — SIGNIFICANT CHANGE UP (ref 0–1)
BILIRUB SERPL-MCNC: 0.2 MG/DL — SIGNIFICANT CHANGE UP (ref 0.2–1.2)
BUN SERPL-MCNC: 55 MG/DL — HIGH (ref 10–20)
CALCIUM SERPL-MCNC: 6.8 MG/DL — LOW (ref 8.4–10.5)
CHLORIDE SERPL-SCNC: 99 MMOL/L — SIGNIFICANT CHANGE UP (ref 98–110)
CO2 SERPL-SCNC: 22 MMOL/L — SIGNIFICANT CHANGE UP (ref 17–32)
CREAT SERPL-MCNC: 2.2 MG/DL — HIGH (ref 0.7–1.5)
EGFR: 24 ML/MIN/1.73M2 — LOW
EOSINOPHIL # BLD AUTO: 0.08 K/UL — SIGNIFICANT CHANGE UP (ref 0–0.7)
EOSINOPHIL NFR BLD AUTO: 1.4 % — SIGNIFICANT CHANGE UP (ref 0–8)
GLUCOSE SERPL-MCNC: 93 MG/DL — SIGNIFICANT CHANGE UP (ref 70–99)
HCT VFR BLD CALC: 26.7 % — LOW (ref 37–47)
HGB BLD-MCNC: 9 G/DL — LOW (ref 12–16)
IMM GRANULOCYTES NFR BLD AUTO: 0.4 % — HIGH (ref 0.1–0.3)
KAPPA LC SER QL IFE: 9.86 MG/DL — HIGH (ref 0.33–1.94)
KAPPA/LAMBDA FREE LIGHT CHAIN RATIO, SERUM: 1.89 RATIO — HIGH (ref 0.26–1.65)
LAMBDA LC SER QL IFE: 5.23 MG/DL — HIGH (ref 0.57–2.63)
LYMPHOCYTES # BLD AUTO: 0.87 K/UL — LOW (ref 1.2–3.4)
LYMPHOCYTES # BLD AUTO: 15.2 % — LOW (ref 20.5–51.1)
MAGNESIUM SERPL-MCNC: 2.1 MG/DL — SIGNIFICANT CHANGE UP (ref 1.8–2.4)
MCHC RBC-ENTMCNC: 31.5 PG — HIGH (ref 27–31)
MCHC RBC-ENTMCNC: 33.7 G/DL — SIGNIFICANT CHANGE UP (ref 32–37)
MCV RBC AUTO: 93.4 FL — SIGNIFICANT CHANGE UP (ref 81–99)
MONOCYTES # BLD AUTO: 0.54 K/UL — SIGNIFICANT CHANGE UP (ref 0.1–0.6)
MONOCYTES NFR BLD AUTO: 9.5 % — HIGH (ref 1.7–9.3)
NEUTROPHILS # BLD AUTO: 4.19 K/UL — SIGNIFICANT CHANGE UP (ref 1.4–6.5)
NEUTROPHILS NFR BLD AUTO: 73.3 % — SIGNIFICANT CHANGE UP (ref 42.2–75.2)
NRBC # BLD: 0 /100 WBCS — SIGNIFICANT CHANGE UP (ref 0–0)
PLATELET # BLD AUTO: 114 K/UL — LOW (ref 130–400)
PMV BLD: 10 FL — SIGNIFICANT CHANGE UP (ref 7.4–10.4)
POTASSIUM SERPL-MCNC: 5 MMOL/L — SIGNIFICANT CHANGE UP (ref 3.5–5)
POTASSIUM SERPL-SCNC: 5 MMOL/L — SIGNIFICANT CHANGE UP (ref 3.5–5)
PROT SERPL-MCNC: 4.6 G/DL — LOW (ref 6–8)
RBC # BLD: 2.86 M/UL — LOW (ref 4.2–5.4)
RBC # FLD: 14.6 % — HIGH (ref 11.5–14.5)
RENIN DIRECT, PLASMA: <2.1 PG/ML — SIGNIFICANT CHANGE UP
SODIUM SERPL-SCNC: 130 MMOL/L — LOW (ref 135–146)
TSH SERPL-MCNC: 0.83 UIU/ML — SIGNIFICANT CHANGE UP (ref 0.27–4.2)
WBC # BLD: 5.71 K/UL — SIGNIFICANT CHANGE UP (ref 4.8–10.8)
WBC # FLD AUTO: 5.71 K/UL — SIGNIFICANT CHANGE UP (ref 4.8–10.8)

## 2024-05-22 PROCEDURE — 99232 SBSQ HOSP IP/OBS MODERATE 35: CPT

## 2024-05-22 RX ORDER — LABETALOL HCL 100 MG
200 TABLET ORAL EVERY 8 HOURS
Refills: 0 | Status: DISCONTINUED | OUTPATIENT
Start: 2024-05-22 | End: 2024-05-24

## 2024-05-22 RX ORDER — LOSARTAN POTASSIUM 100 MG/1
25 TABLET, FILM COATED ORAL AT BEDTIME
Refills: 0 | Status: DISCONTINUED | OUTPATIENT
Start: 2024-05-22 | End: 2024-05-24

## 2024-05-22 RX ORDER — ISOSORBIDE DINITRATE 5 MG/1
1 TABLET ORAL
Qty: 90 | Refills: 0
Start: 2024-05-22 | End: 2024-06-20

## 2024-05-22 RX ORDER — HYDRALAZINE HCL 50 MG
1 TABLET ORAL
Qty: 90 | Refills: 0
Start: 2024-05-22 | End: 2024-06-20

## 2024-05-22 RX ORDER — CALCIUM CARBONATE 500(1250)
1 TABLET ORAL
Refills: 0 | Status: DISCONTINUED | OUTPATIENT
Start: 2024-05-22 | End: 2024-05-24

## 2024-05-22 RX ADMIN — ISOSORBIDE DINITRATE 10 MILLIGRAM(S): 5 TABLET ORAL at 06:27

## 2024-05-22 RX ADMIN — LOSARTAN POTASSIUM 25 MILLIGRAM(S): 100 TABLET, FILM COATED ORAL at 23:22

## 2024-05-22 RX ADMIN — Medication 25 MILLIGRAM(S): at 06:27

## 2024-05-22 RX ADMIN — Medication 1 TABLET(S): at 17:12

## 2024-05-22 RX ADMIN — Medication 200 MILLIGRAM(S): at 18:20

## 2024-05-22 RX ADMIN — LOSARTAN POTASSIUM 25 MILLIGRAM(S): 100 TABLET, FILM COATED ORAL at 06:28

## 2024-05-22 RX ADMIN — Medication 300 MILLIGRAM(S): at 06:27

## 2024-05-22 RX ADMIN — Medication 81 MILLIGRAM(S): at 11:15

## 2024-05-22 RX ADMIN — Medication 10 MILLIGRAM(S): at 06:27

## 2024-05-22 RX ADMIN — PANTOPRAZOLE SODIUM 40 MILLIGRAM(S): 20 TABLET, DELAYED RELEASE ORAL at 06:27

## 2024-05-22 RX ADMIN — Medication 25 MILLIGRAM(S): at 18:20

## 2024-05-22 NOTE — PROGRESS NOTE ADULT - ASSESSMENT
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   #Frequent Sleep Apneas at night - high STOP/BANG score   - currently -140s , but runs 200s in AM - losartan switched to night time  - likely dc today  - monitor BP for control for full 24 hours   - 2/2 htn workup sent   - cardio consult noted - will start hydralazine and isosorbide for additional BP control, agree with 2/2 htn workup, needs further hx regarding amyloidosis   - consider consult Pulm and f/u OP with pulmonary for sleep study  - stress test normal    #HFmrEF  #Mild volume overload with JVD present   #NSTEMI   - CE stable   - TTE - she did a TTE at [facility] and recently and showed  EF 75% - septal LVH - G1DD - hyperdynamic LVSF/no wall motion abn - mild valvular abn   - TTE here with EF 45-50 % , BNP 12k   - s/p IV Lasix dose x2   - Cardio consult for new heart failure, hx of amyloidosis  - advise obtaining hx regarding amyloidosis, and Stress Test   - c/w home lasix 20   - c/w home meds     #Rheumatoid arthritis on prednisone   #HX of DVT s/p IVC filter   #Osteoporosis   - Duplex with chronic dvt b/l LE   - A1c/lipid profile - both well controlled   - c/w home meds     #DVT - IVC filter  #Code - Full code     Dispo: likely dc, f/u outpt for 2nd htn w/up 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   #Frequent Sleep Apneas at night - high STOP/BANG score   - currently -140s , but runs 200s in AM - losartan switched to night time, labetalol changed to 200 TID as per cardio recs  - cardio would like to monitor one more day for BP  - 2/2 htn workup sent - renal doppler negative, f/u urine metanephrines, renin and aldosterone   - consider consult Pulm and f/u OP with pulmonary for sleep study  - stress test normal    #HFmrEF  #Mild volume overload with JVD present   #NSTEMI   - CE stable   - TTE - she did a TTE at [facility] and recently and showed  EF 75% - septal LVH - G1DD - hyperdynamic LVSF/no wall motion abn - mild valvular abn   - TTE here with EF 45-50 % , BNP 12k   - s/p IV Lasix dose x2   - Cardio consult for new heart failure, hx of amyloidosis  - advise obtaining hx regarding amyloidosis, and Stress Test   - c/w home lasix 20   - c/w home meds     #Rheumatoid arthritis on prednisone   #HX of DVT s/p IVC filter   #Osteoporosis   - Duplex with chronic dvt b/l LE   - A1c/lipid profile - both well controlled   - c/w home meds     #DVT - IVC filter  #Code - Full code     Pending: monitor 24hrs for BP, f/u urine metanephrines, aldosterone, renin levels and amyloidosis w/up 68-year-old female with a past medical history of hypertension, Osteoporosis, DVT not on AC s/p IVC filter (still present), Rheumatoid arthritis, SLE on Prednisone/Enbrel who presents to the ED for evaluation of refractory HTN    #HTN Urgencyy- improved   #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   #Frequent Sleep Apneas at night - high STOP/BANG score   - currently -140s , but runs 200s in AM - losartan switched to night time, labetalol changed to 200 TID as per cardio recs  - cardio would like to monitor one more day for BP  - 2/2 htn workup sent - renal doppler negative, f/u urine metanephrines, renin and aldosterone   - consider consult Pulm and f/u OP with pulmonary for sleep study  - stress test normal    #HFmrEF  #Mild volume overload with JVD present   #NSTEMI   - CE stable   - TTE - she did a TTE at [facility] and recently and showed  EF 75% - septal LVH - G1DD - hyperdynamic LVSF/no wall motion abn - mild valvular abn   - TTE here with EF 45-50 % , BNP 12k   - s/p IV Lasix dose x2   - Cardio consult for new heart failure, hx of amyloidosis  - advise obtaining hx regarding amyloidosis, and Stress Test   - c/w home lasix 20   - c/w home meds     #Rheumatoid arthritis on prednisone   #HX of DVT s/p IVC filter   #Osteoporosis   - Duplex with chronic dvt b/l LE   - A1c/lipid profile - both well controlled   - c/w home meds     #DVT - IVC filter  #Code - Full code     Pending: monitor 24hrs for BP, f/u urine metanephrines, aldosterone, renin levels and amyloidosis w/up

## 2024-05-22 NOTE — PROGRESS NOTE ADULT - SUBJECTIVE AND OBJECTIVE BOX
24H events:    Patient is a 68y old Female who presents with a chief complaint of   Primary diagnosis of Hypertensive emergency    Today is hospital day 3d. This morning patient was seen and examined at bedside, resting comfortably in bed.    No acute or major events overnight.    PAST MEDICAL & SURGICAL HISTORY    SOCIAL HISTORY:  Social History:      ALLERGIES:  No Known Allergies    MEDICATIONS:  STANDING MEDICATIONS  aspirin  chewable 81 milliGRAM(s) Oral daily  calcium carbonate   1250 mG (OsCal) 1 Tablet(s) Oral two times a day  hydrALAZINE 25 milliGRAM(s) Oral every 8 hours  isosorbide   dinitrate Tablet (ISORDIL) 10 milliGRAM(s) Oral three times a day  labetalol 300 milliGRAM(s) Oral every 12 hours  losartan 25 milliGRAM(s) Oral at bedtime  pantoprazole    Tablet 40 milliGRAM(s) Oral before breakfast  predniSONE   Tablet 10 milliGRAM(s) Oral daily    PRN MEDICATIONS  acetaminophen     Tablet .. 650 milliGRAM(s) Oral every 6 hours PRN  aluminum hydroxide/magnesium hydroxide/simethicone Suspension 30 milliLiter(s) Oral every 4 hours PRN  melatonin 3 milliGRAM(s) Oral at bedtime PRN  ondansetron Injectable 4 milliGRAM(s) IV Push every 8 hours PRN    VITALS:   T(F): 98.2  HR: 72  BP: 120/71  RR: 18  SpO2: --    PHYSICAL EXAM:  GENERAL:   ( x ) NAD, lying in bed comfortably     (  ) obtunded     (  ) lethargic     (  ) somnolent    HEAD:   ( x ) Atraumatic     (  ) hematoma     (  ) laceration (specify location:       )     HEART:  Rate -->     ( x ) normal rate     (  ) bradycardic     (  ) tachycardic  Rhythm -->     ( x ) regular     (  ) regularly irregular     (  ) irregularly irregular  Murmurs -->     ( x ) normal s1s2     (  ) systolic murmur     (  ) diastolic murmur     (  ) continuous murmur      (  ) S3 present     (  ) S4 present    LUNGS:   ( x )Unlabored respirations     (  ) tachypnea  ( x ) B/L air entry     (  ) decreased breath sounds in:  (location     )    (  ) no adventitious sound     (  ) crackles     (  ) wheezing      (  ) rhonchi      (specify location:       )  (  ) chest wall tenderness (specify location:       )    ABDOMEN:   ( x ) Soft     (  ) tense   |   (  ) nondistended     (  ) distended   |   (  ) +BS     (  ) hypoactive bowel sounds     (  ) hyperactive bowel sounds  (  ) nontender     (  ) RUQ tenderness     (  ) RLQ tenderness     (  ) LLQ tenderness     (  ) epigastric tenderness     (  ) diffuse tenderness  (  ) Splenomegaly      (  ) Hepatomegaly      (  ) Jaundice     (  ) ecchymosis     EXTREMITIES:  ( x ) Normal     (  ) Rash     (  ) ecchymosis     (  ) varicose veins      (  ) pitting edema     (  ) non-pitting edema   (  ) ulceration     (  ) gangrene:     (location:     )    NERVOUS SYSTEM:    ( x ) A&Ox3     (  ) confused     (  ) lethargic  CN II-XII:     (  ) Intact     (  ) deficits found     (Specify:     )   Upper extremities:     (  ) no sensorimotor deficits     (  ) weakness     (  ) loss of proprioception/vibration     (  ) loss of touch/temperature (specify:    )  Lower extremities:     (  ) no sensorimotor deficits     (  ) weakness     (  ) loss of proprioception/vibration     (  ) loss of touch/temperature (specify:    )    (  ) Indwelling Gonzales Catheter:   Date insterted:    Reason (  ) Critical illness     (  ) urinary retention    (  ) Accurate Ins/Outs Monitoring     (  ) CMO patient    (  ) Central Line:   Date inserted:  Location: (  ) Right IJ     (  ) Left IJ     (  ) Right Fem     (  ) Left Fem    (  ) SPC        (  ) pigtail       (  ) PEG tube       (  ) colostomy       (  ) jejunostomy  (  ) U-Dall    LABS:                        9.0    5.71  )-----------( 114      ( 22 May 2024 07:19 )             26.7     05-22    130<L>  |  99  |  55<H>  ----------------------------<  93  5.0   |  22  |  2.2<H>    Ca    6.8<L>      22 May 2024 07:19  Mg     2.1     05-22    TPro  4.6<L>  /  Alb  2.9<L>  /  TBili  0.2  /  DBili  x   /  AST  15  /  ALT  7   /  AlkPhos  54  05-22      Urinalysis Basic - ( 22 May 2024 07:19 )    Color: x / Appearance: x / SG: x / pH: x  Gluc: 93 mg/dL / Ketone: x  / Bili: x / Urobili: x   Blood: x / Protein: x / Nitrite: x   Leuk Esterase: x / RBC: x / WBC x   Sq Epi: x / Non Sq Epi: x / Bacteria: x                RADIOLOGY:

## 2024-05-22 NOTE — PROGRESS NOTE ADULT - SUBJECTIVE AND OBJECTIVE BOX
HPI  68-year-old female with a past medical history of hypertension, Osteoporosis, DVT not on AC s/p IVC filter (still present), Rheumatoid arthritis, SLE on Prednisone/Enbrel who presents to the ED for evaluation of refractory HTN.  Per family, patient has been having elevated blood pressure since 5 days ago; baseline blood pressure is 150 systolic, but blood pressure has been in the 200s systolic.   Also endorses dizziness that started earlier today; intermittent and only occurs with movement.    Also endorses chest pain that started earlier today; pain is in the middle of the chest, nonradiating, nonexertional, dull, and there is no alleviating worsening.    Also endorses shortness of breath along with her symptoms.   She is snoring at night and have a lot of apneas   She follows with a cardiologist at [facility] HTN  -> 180s   Labs trops 62>56 - BNP 12K - Hb 9.4 - creatinine 2.1 - VBG noted   EKG NSR ian   Imaging: CXR no pulm abnormalities - cardiomegaly   VA doppler LE prelim shows signs of DVT   CT angio chest neg for PE - cardiomegaly - compression fracture  In the ED, given lasix 40 once - calcium gluconate - lokelma - D5/regular insulin - labetalol 300 BID - aspirion 325 (19 May 2024 09:27)      CARDIOLOGY CONSULT    PAST MEDICAL & SURGICAL HISTORY      FAMILY HISTORY  FAMILY HISTORY:      SOCIAL HISTORY  []smoker  []Alcohol  []Drug    ALLERGIES  No Known Allergies      MEDICATIONS:  MEDICATIONS  (STANDING):  aspirin  chewable 81 milliGRAM(s) Oral daily  hydrALAZINE 25 milliGRAM(s) Oral every 8 hours  isosorbide   dinitrate Tablet (ISORDIL) 10 milliGRAM(s) Oral three times a day  labetalol 300 milliGRAM(s) Oral every 12 hours  losartan 25 milliGRAM(s) Oral at bedtime  pantoprazole    Tablet 40 milliGRAM(s) Oral before breakfast  predniSONE   Tablet 10 milliGRAM(s) Oral daily    MEDICATIONS  (PRN):  acetaminophen     Tablet .. 650 milliGRAM(s) Oral every 6 hours PRN Temp greater or equal to 38C (100.4F), Mild Pain (1 - 3)  aluminum hydroxide/magnesium hydroxide/simethicone Suspension 30 milliLiter(s) Oral every 4 hours PRN Dyspepsia  melatonin 3 milliGRAM(s) Oral at bedtime PRN Insomnia  ondansetron Injectable 4 milliGRAM(s) IV Push every 8 hours PRN Nausea and/or Vomiting      HOME MEDICATIONS  Home Medications:  Enbrel 25 mg/0.5 mL subcutaneous solution: 25 milligram(s) subcutaneously every 3 weeks (19 May 2024 11:10)  losartan 25 mg oral tablet: 1 tab(s) orally once a day (19 May 2024 11:10)  omeprazole 20 mg oral delayed release capsule: 1 cap(s) orally (19 May 2024 11:10)  predniSONE 10 mg oral tablet: 1 tab(s) orally once a day (19 May 2024 11:10)  Prolia 60 mg/mL subcutaneous solution: 60 milligram(s) subcutaneously every 6 months (19 May 2024 11:12)      VITALS   T(F): 98.2 (05-22 @ 04:27), Max: 98.4 (05-19 @ 04:10)  HR: 72 (05-22 @ 08:34) (66 - 83)  BP: 120/71 (05-22 @ 08:34) (120/71 - 240/109)  BP(mean): --  RR: 18 (05-22 @ 04:27) (16 - 18)  SpO2: 98% (05-20 @ 02:06) (98% - 99%)    I&O's Summary    21 May 2024 07:01  -  22 May 2024 07:00  --------------------------------------------------------  IN: 670 mL / OUT: 900 mL / NET: -230 mL        REVIEW OF SYSTEMS  CONSTITUTIONAL: No weakness, fevers or chills  EYES: No visual changes  ENT: No vertigo or throat pain   NECK: No pain or stiffness  RESPIRATORY: No cough, wheezing, hemoptysis; No shortness of breath  CARDIOVASCULAR: No chest pain or palpitations  GASTROINTESTINAL: No abdominal or epigastric pain. No nausea, vomiting, or hematemesis; No diarrhea or constipation. No melena or hematochezia.  GENITOURINARY: No dysuria, frequency or hematuria  NEUROLOGICAL: No numbness or weakness  SKIN: No itching, no rashes  MSK: No pain    PHYSICAL EXAM  NEURO: patient is awake , alert and oriented  GEN: Not in acute distress  NECK: no thyroid enlargement, no JVD  LUNGS: Clear to auscultation bilaterally   CARDIOVASCULAR: S1/S2 present, RRR , no murmurs or rubs, no carotid bruits,  + PP bilaterally  ABD: Soft, non-tender, non-distended, +BS  EXT: No LOVELY  SKIN: Intact    LABS:                        9.0    5.71  )-----------( 114      ( 22 May 2024 07:19 )             26.7     05-22    130<L>  |  99  |  55<H>  ----------------------------<  93  5.0   |  22  |  2.2<H>    Ca    6.8<L>      22 May 2024 07:19  Mg     2.1     05-22    TPro  4.6<L>  /  Alb  2.9<L>  /  TBili  0.2  /  DBili  x   /  AST  15  /  ALT  7   /  AlkPhos  54  05-22        Troponin Trend:      05-20 Chol 156 LDL -- HDL 69 Trig 36      RADIOLOGY  -CXR:  -TTE:  -CCTA:  -STRESS TEST:  -CATHETERIZATION:    ECG    TELEMETRY EVENTS   HPI  68-year-old female with a past medical history of hypertension, Osteoporosis, DVT not on AC s/p IVC filter (still present), Rheumatoid arthritis, SLE on Prednisone/Enbrel who presents to the ED for evaluation of refractory HTN.  Per family, patient has been having elevated blood pressure since 5 days ago; baseline blood pressure is 150 systolic, but blood pressure has been in the 200s systolic.   Also endorses dizziness that started earlier today; intermittent and only occurs with movement.    Also endorses chest pain that started earlier today; pain is in the middle of the chest, nonradiating, nonexertional, dull, and there is no alleviating worsening.    Also endorses shortness of breath along with her symptoms.   She is snoring at night and have a lot of apneas   She follows with a cardiologist at [facility] HTN  -> 180s   Labs trops 62>56 - BNP 12K - Hb 9.4 - creatinine 2.1 - VBG noted   EKG NSR ian   Imaging: CXR no pulm abnormalities - cardiomegaly   VA doppler LE prelim shows signs of DVT   CT angio chest neg for PE - cardiomegaly - compression fracture  In the ED, given lasix 40 once - calcium gluconate - lokelma - D5/regular insulin - labetalol 300 BID - aspirin 325 (19 May 2024 09:27)      CARDIOLOGY CONSULT    Pt was admitted for HT emergency. BP and symptoms controlled with medical management by primary team. TTE obtained reveals mildly reduced EF(45-50%).She is being evaluated at [facility] for shoulder repair after dislocation recently. Pt denied chest pain, shortness of breath, palpitations and dizziness. Cardiology team was consulted to evaluate for reduced EF and secondary causes of HT.     5/22  Pt's BP is fluctuating, with SBP >180s in the morning and 130-150 for rest of the day. Nuclear stress test revealed no ischemia. Renal artery duplex - normal. Renin activity, aldosterone lvl, urine metanephrins and Immunofixation results - pending. Cardiology team is following up today to optimize medication.       PAST MEDICAL & SURGICAL HISTORY  SLE   RA   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osis   h/o DVT s/p IVC filter  HT       SOCIAL HISTORY  []smoker  []Alcohol  []Drug    ALLERGIES  No Known Allergies      MEDICATIONS:  MEDICATIONS  (STANDING):  aspirin  chewable 81 milliGRAM(s) Oral daily  hydrALAZINE 25 milliGRAM(s) Oral every 8 hours  isosorbide   dinitrate Tablet (ISORDIL) 10 milliGRAM(s) Oral three times a day  labetalol 300 milliGRAM(s) Oral every 12 hours  losartan 25 milliGRAM(s) Oral at bedtime  pantoprazole    Tablet 40 milliGRAM(s) Oral before breakfast  predniSONE   Tablet 10 milliGRAM(s) Oral daily    MEDICATIONS  (PRN):  acetaminophen     Tablet .. 650 milliGRAM(s) Oral every 6 hours PRN Temp greater or equal to 38C (100.4F), Mild Pain (1 - 3)  aluminum hydroxide/magnesium hydroxide/simethicone Suspension 30 milliLiter(s) Oral every 4 hours PRN Dyspepsia  melatonin 3 milliGRAM(s) Oral at bedtime PRN Insomnia  ondansetron Injectable 4 milliGRAM(s) IV Push every 8 hours PRN Nausea and/or Vomiting      HOME MEDICATIONS  Home Medications:  Enbrel 25 mg/0.5 mL subcutaneous solution: 25 milligram(s) subcutaneously every 3 weeks (19 May 2024 11:10)  losartan 25 mg oral tablet: 1 tab(s) orally once a day (19 May 2024 11:10)  omeprazole 20 mg oral delayed release capsule: 1 cap(s) orally (19 May 2024 11:10)  predniSONE 10 mg oral tablet: 1 tab(s) orally once a day (19 May 2024 11:10)  Prolia 60 mg/mL subcutaneous solution: 60 milligram(s) subcutaneously every 6 months (19 May 2024 11:12)      VITALS   T(F): 98.2 (05-22 @ 04:27), Max: 98.4 (05-19 @ 04:10)  HR: 72 (05-22 @ 08:34) (66 - 83)  BP: 120/71 (05-22 @ 08:34) (120/71 - 240/109)  BP(mean): --  RR: 18 (05-22 @ 04:27) (16 - 18)  SpO2: 98% (05-20 @ 02:06) (98% - 99%)    I&O's Summary    21 May 2024 07:01  -  22 May 2024 07:00  --------------------------------------------------------  IN: 670 mL / OUT: 900 mL / NET: -230 mL        REVIEW OF SYSTEMS  CONSTITUTIONAL: No weakness, fevers or chills  EYES: No visual changes  ENT: No vertigo or throat pain   NECK: No pain or stiffness  RESPIRATORY: No cough, wheezing, hemoptysis; No shortness of breath  CARDIOVASCULAR: No chest pain or palpitations  GASTROINTESTINAL: No abdominal or epigastric pain. No nausea, vomiting, or hematemesis; No diarrhea or constipation. No melena or hematochezia.  GENITOURINARY: No dysuria, frequency or hematuria  NEUROLOGICAL: No numbness or weakness  SKIN: No itching, no rashes  MSK: No pain    PHYSICAL EXAM  NEURO: patient is awake , alert and oriented  GEN: Not in acute distress  NECK: no thyroid enlargement, no JVD  LUNGS: Clear to auscultation bilaterally   CARDIOVASCULAR: S1/S2 present, RRR , no murmurs or rubs, no carotid bruits,  + PP bilaterally  ABD: Soft, non-tender, non-distended, +BS  EXT: No LOVELY  SKIN: Intact    LABS:                        9.0    5.71  )-----------( 114      ( 22 May 2024 07:19 )             26.7     05-22    130<L>  |  99  |  55<H>  ----------------------------<  93  5.0   |  22  |  2.2<H>    Ca    6.8<L>      22 May 2024 07:19  Mg     2.1     05-22    TPro  4.6<L>  /  Alb  2.9<L>  /  TBili  0.2  /  DBili  x   /  AST  15  /  ALT  7   /  AlkPhos  54  05-22      05-20 Chol 156 LDL -- HDL 69 Trig 36      RADIOLOGY  -CXR: No acute path noted   -TTE: LVEF 45-50%, Mildly decreased global LV systolic. Moderately increased LV basal septal wall thickness; GD1DD  -STRESS TEST: 5/21 - No ischemia changes.     ECG  NSR, no obvious St changes     TELEMETRY EVENTS  No events

## 2024-05-22 NOTE — PROGRESS NOTE ADULT - ASSESSMENT
INCOMPLETE NOTE   INCOMPLETE NOTE    #HTN urgency             - Labile HT with elevated BP in AM               - Renal artery duplex - normal              - Serum and urine IF, TSH/T4, Renin and aldosterone lvl pending              - currently on Labetalol 300mg BID, hydralazine 25mg tid, ISDO 10mg TID and losartan 25mg   #Heart failure with mildly reduced EF - possibly from uncontrolled BP  #Troponemia 2/2 demand ischemia             - ECG - no ischemia changes, trops stable 62--->56--->63            - NST - no ischemic changes            - TTE 5/19 - EF 45-50%, G1DD; decreased from EF 75% (~1 month back)   #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 ? lupus nephritis   #HX of DVT s/p IVC filter       RECOMMENDATIONS          68-year-old female with a past medical history of hypertension, Osteoporosis, DVT not on AC s/p IVC filter (still present), Rheumatoid arthritis, SLE on Prednisone/Enbrel who presents to the ED for evaluation of refractory HTN.Pt was admitted for HT emergency. BP and symptoms controlled with medical management by primary team. TTE obtained reveals mildly reduced EF(45-50%). Cardiology team was consulted to evaluate for reduced EF and secondary causes of HT. She is being evaluated at [facility] for shoulder repair after dislocation recently. Pt denied chest pain, shortness of breath, palpitations and dizziness.       IMPRESSION  #HTN urgency             - Labile HT with elevated BP in AM               - Renal artery duplex - normal              - Serum and urine IF, TSH/T4, Renin and aldosterone lvl pending              - currently on Labetalol 300mg BID, hydralazine 25mg tid, ISDO 10mg TID and losartan 25mg   #Heart failure with mildly reduced EF - possibly from uncontrolled BP  #Troponemia 2/2 demand ischemia             - Pharm. 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 ? lupus nephritis   #HX of DVT s/p IVC filter       RECOMMENDATIONS   - Change labetalol 300mg BID to 200mg TID  - Continue Losartan 25mg QD (Bedtime), Hydralazine 25mg TID and ISDO 10mg TID   - 24hr BP monitoring, if BP remains high with above measures, may add HCT 25mg QD and increase Hydralazine to 50mg TID  - f/u remaining secondary HT work-up OP   - Cardio team will sign off

## 2024-05-22 NOTE — PROGRESS NOTE ADULT - NS ATTEND AMEND GEN_ALL_CORE FT
68yoF with HTN, rheumatoid arthritis, SLE on prednisone/Enbrel, and DVT s/p IVC filter (not on AC) who was admitted with hypertensive urgency, found to have new diagnosis of HFmrEF with TTE showing LVEF 45-50%, moderately increase septal wall thickness, G1DD, and LAE. Nuclear stress test with no findings of ischemia, making her uncontrolled hypertension the most likely cause of her HFmrEF.     Today, we confirmed with patient and granddaughter that she is able to take TID medications. Although she has HFmrEF, my priority with regard to her care is better control of her HTN, as opposed to GDMT. Therefore, I will plan to start her on medications to lower her BP. If her EF remains reduced with better BP control, can consider transitioning to GDMT (carvedilol instead of labetalol, Entresto instead of losartan, etc) in the future.     Recommendations:   - Can change labetalol from 300 mg BID to 200 mg q8h for ease of medication dosing schedule  - Continue hydralazine 25 mg q8h and isosorbide 10 mg q8h  - Dose losartan 25 mg at NIGHT, as patient often has elevated morning BPs  - For additional BP control, can add a diuretic, such as HCTZ or chlorthalidone  - Can increase labetalol, hydralazine, or isosorbide for further BP control  - Outpatient sleep study  - Aldosterone wnl  - Please order renin activity, not direct renin  - Serum immunofixation and urine immunofixation are pending  - Duplex ruled out renal artery stenosis  - Order PYP scan to rule out TTR amyloid, although low suspicion given elevated BP  - A1c 5.1 LDL 80, TSH 0.83  - Cardiology consult team to sign off

## 2024-05-22 NOTE — CDI QUERY NOTE - NSCDIOTHERTXTBX_GEN_ALL_CORE_HH
Clinical documentation and/or evidence in the medical record indicates that this patient has CHF.  In order to ensure accurate coding and accuracy of the clinical record, the documentation in this patient’s medical record requires additional clarification.      Please include more specific documentation as to the type of CHF in your progress note and/or discharge summary.    Please specify acuity of HFmrEF:  •	Acute   •	Other (please specify)    Supporting documentation and/or clinical evidence:   5/20 Consult Note Adult-Cardiology Resident/Attending: … new diagnosis of HFmrEF with TTE showing LVEF 45-50%, moderately increase septal wall thickness, G1DD, and LAE …    5/22 Progress Note Adult-Internal Medicine Resident: … HFmrEF. Mild volume overload with JVD present …  she did a TTE at [facility] and recently and showed  EF 75% - septal LVH - G1DD - hyperdynamic LVSF/no wall motion abn - mild valvular abn …    Labs:  5/18 BNP 12,285    Meds:  4/19 Lasix 40mg IVP x1 dose  5/20 Lasix 20mg IVP x1 dose      Thank you,  Ailyn Pan RN, BSN  [phone]

## 2024-05-23 LAB
ALBUMIN SERPL ELPH-MCNC: 2.9 G/DL — LOW (ref 3.5–5.2)
ALP SERPL-CCNC: 54 U/L — SIGNIFICANT CHANGE UP (ref 30–115)
ALT FLD-CCNC: 8 U/L — SIGNIFICANT CHANGE UP (ref 0–41)
ANION GAP SERPL CALC-SCNC: 4 MMOL/L — LOW (ref 7–14)
ANION GAP SERPL CALC-SCNC: 9 MMOL/L — SIGNIFICANT CHANGE UP (ref 7–14)
AST SERPL-CCNC: 14 U/L — SIGNIFICANT CHANGE UP (ref 0–41)
BASOPHILS # BLD AUTO: 0.01 K/UL — SIGNIFICANT CHANGE UP (ref 0–0.2)
BASOPHILS NFR BLD AUTO: 0.2 % — SIGNIFICANT CHANGE UP (ref 0–1)
BILIRUB SERPL-MCNC: 0.2 MG/DL — SIGNIFICANT CHANGE UP (ref 0.2–1.2)
BUN SERPL-MCNC: 55 MG/DL — HIGH (ref 10–20)
BUN SERPL-MCNC: 58 MG/DL — HIGH (ref 10–20)
CALCIUM SERPL-MCNC: 6.6 MG/DL — LOW (ref 8.4–10.5)
CALCIUM SERPL-MCNC: 6.7 MG/DL — LOW (ref 8.4–10.5)
CHLORIDE SERPL-SCNC: 94 MMOL/L — LOW (ref 98–110)
CHLORIDE SERPL-SCNC: 94 MMOL/L — LOW (ref 98–110)
CO2 SERPL-SCNC: 19 MMOL/L — SIGNIFICANT CHANGE UP (ref 17–32)
CO2 SERPL-SCNC: 27 MMOL/L — SIGNIFICANT CHANGE UP (ref 17–32)
CREAT SERPL-MCNC: 2.3 MG/DL — HIGH (ref 0.7–1.5)
CREAT SERPL-MCNC: 2.3 MG/DL — HIGH (ref 0.7–1.5)
EGFR: 22 ML/MIN/1.73M2 — LOW
EGFR: 22 ML/MIN/1.73M2 — LOW
EOSINOPHIL # BLD AUTO: 0.1 K/UL — SIGNIFICANT CHANGE UP (ref 0–0.7)
EOSINOPHIL NFR BLD AUTO: 1.7 % — SIGNIFICANT CHANGE UP (ref 0–8)
GLUCOSE SERPL-MCNC: 119 MG/DL — HIGH (ref 70–99)
GLUCOSE SERPL-MCNC: 94 MG/DL — SIGNIFICANT CHANGE UP (ref 70–99)
HCT VFR BLD CALC: 27.5 % — LOW (ref 37–47)
HGB BLD-MCNC: 9.1 G/DL — LOW (ref 12–16)
IMM GRANULOCYTES NFR BLD AUTO: 0.3 % — SIGNIFICANT CHANGE UP (ref 0.1–0.3)
LYMPHOCYTES # BLD AUTO: 0.9 K/UL — LOW (ref 1.2–3.4)
LYMPHOCYTES # BLD AUTO: 15 % — LOW (ref 20.5–51.1)
MAGNESIUM SERPL-MCNC: 2.8 MG/DL — HIGH (ref 1.8–2.4)
MCHC RBC-ENTMCNC: 31.2 PG — HIGH (ref 27–31)
MCHC RBC-ENTMCNC: 33.1 G/DL — SIGNIFICANT CHANGE UP (ref 32–37)
MCV RBC AUTO: 94.2 FL — SIGNIFICANT CHANGE UP (ref 81–99)
MONOCYTES # BLD AUTO: 0.53 K/UL — SIGNIFICANT CHANGE UP (ref 0.1–0.6)
MONOCYTES NFR BLD AUTO: 8.8 % — SIGNIFICANT CHANGE UP (ref 1.7–9.3)
NEUTROPHILS # BLD AUTO: 4.45 K/UL — SIGNIFICANT CHANGE UP (ref 1.4–6.5)
NEUTROPHILS NFR BLD AUTO: 74 % — SIGNIFICANT CHANGE UP (ref 42.2–75.2)
NRBC # BLD: 0 /100 WBCS — SIGNIFICANT CHANGE UP (ref 0–0)
OSMOLALITY SERPL: 284 MOS/KG — SIGNIFICANT CHANGE UP (ref 280–301)
PLATELET # BLD AUTO: 121 K/UL — LOW (ref 130–400)
PMV BLD: 10 FL — SIGNIFICANT CHANGE UP (ref 7.4–10.4)
POTASSIUM SERPL-MCNC: 4.8 MMOL/L — SIGNIFICANT CHANGE UP (ref 3.5–5)
POTASSIUM SERPL-MCNC: 5.3 MMOL/L — HIGH (ref 3.5–5)
POTASSIUM SERPL-SCNC: 4.8 MMOL/L — SIGNIFICANT CHANGE UP (ref 3.5–5)
POTASSIUM SERPL-SCNC: 5.3 MMOL/L — HIGH (ref 3.5–5)
PROT SERPL-MCNC: 4.6 G/DL — LOW (ref 6–8)
RBC # BLD: 2.92 M/UL — LOW (ref 4.2–5.4)
RBC # FLD: 14.6 % — HIGH (ref 11.5–14.5)
SODIUM SERPL-SCNC: 122 MMOL/L — LOW (ref 135–146)
SODIUM SERPL-SCNC: 125 MMOL/L — LOW (ref 135–146)
WBC # BLD: 6.01 K/UL — SIGNIFICANT CHANGE UP (ref 4.8–10.8)
WBC # FLD AUTO: 6.01 K/UL — SIGNIFICANT CHANGE UP (ref 4.8–10.8)

## 2024-05-23 PROCEDURE — 78803 RP LOCLZJ TUM SPECT 1 AREA: CPT | Mod: 26

## 2024-05-23 PROCEDURE — 99232 SBSQ HOSP IP/OBS MODERATE 35: CPT

## 2024-05-23 RX ORDER — CHLORHEXIDINE GLUCONATE 213 G/1000ML
1 SOLUTION TOPICAL
Refills: 0 | Status: DISCONTINUED | OUTPATIENT
Start: 2024-05-23 | End: 2024-05-24

## 2024-05-23 RX ORDER — HEPARIN SODIUM 5000 [USP'U]/ML
5000 INJECTION INTRAVENOUS; SUBCUTANEOUS EVERY 12 HOURS
Refills: 0 | Status: DISCONTINUED | OUTPATIENT
Start: 2024-05-23 | End: 2024-05-24

## 2024-05-23 RX ORDER — SODIUM ZIRCONIUM CYCLOSILICATE 10 G/10G
10 POWDER, FOR SUSPENSION ORAL ONCE
Refills: 0 | Status: COMPLETED | OUTPATIENT
Start: 2024-05-23 | End: 2024-05-23

## 2024-05-23 RX ORDER — SODIUM CHLORIDE 9 MG/ML
1000 INJECTION INTRAMUSCULAR; INTRAVENOUS; SUBCUTANEOUS
Refills: 0 | Status: DISCONTINUED | OUTPATIENT
Start: 2024-05-23 | End: 2024-05-24

## 2024-05-23 RX ADMIN — PANTOPRAZOLE SODIUM 40 MILLIGRAM(S): 20 TABLET, DELAYED RELEASE ORAL at 06:03

## 2024-05-23 RX ADMIN — Medication 25 MILLIGRAM(S): at 13:09

## 2024-05-23 RX ADMIN — Medication 1 TABLET(S): at 17:45

## 2024-05-23 RX ADMIN — ISOSORBIDE DINITRATE 10 MILLIGRAM(S): 5 TABLET ORAL at 06:04

## 2024-05-23 RX ADMIN — SODIUM CHLORIDE 60 MILLILITER(S): 9 INJECTION INTRAMUSCULAR; INTRAVENOUS; SUBCUTANEOUS at 21:14

## 2024-05-23 RX ADMIN — Medication 25 MILLIGRAM(S): at 00:08

## 2024-05-23 RX ADMIN — Medication 25 MILLIGRAM(S): at 21:14

## 2024-05-23 RX ADMIN — Medication 200 MILLIGRAM(S): at 13:09

## 2024-05-23 RX ADMIN — Medication 1 TABLET(S): at 06:03

## 2024-05-23 RX ADMIN — Medication 81 MILLIGRAM(S): at 13:09

## 2024-05-23 RX ADMIN — Medication 25 MILLIGRAM(S): at 06:03

## 2024-05-23 RX ADMIN — Medication 10 MILLIGRAM(S): at 06:03

## 2024-05-23 RX ADMIN — LOSARTAN POTASSIUM 25 MILLIGRAM(S): 100 TABLET, FILM COATED ORAL at 21:15

## 2024-05-23 RX ADMIN — HEPARIN SODIUM 5000 UNIT(S): 5000 INJECTION INTRAVENOUS; SUBCUTANEOUS at 21:15

## 2024-05-23 RX ADMIN — Medication 200 MILLIGRAM(S): at 06:04

## 2024-05-23 RX ADMIN — Medication 200 MILLIGRAM(S): at 21:14

## 2024-05-23 RX ADMIN — Medication 200 MILLIGRAM(S): at 00:07

## 2024-05-23 NOTE — PROGRESS NOTE ADULT - ASSESSMENT
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   #Frequent Sleep Apneas at night - high STOP/BANG score   - currently -140s , but runs 170s in AM - losartan switched to night time, labetalol changed to 200 TID as per cardio recs  - 2/2 htn workup sent - renal doppler negative, f/u urine metanephrines, renin and aldosterone   - PYP scan for amyloidosis  - consider consult Pulm and f/u OP with pulmonary for sleep study  - stress test normal    #HFmrEF  #Mild volume overload with JVD present   #NSTEMI   - CE stable   - TTE - she did a TTE at [facility] and recently and showed  EF 75% - septal LVH - G1DD - hyperdynamic LVSF/no wall motion abn - mild valvular abn   - TTE here with EF 45-50 % , BNP 12k   - s/p IV Lasix dose x2   - Cardio consult for new heart failure, hx of amyloidosis  - advise obtaining hx regarding amyloidosis, and Stress Test   - c/w home lasix 20   - c/w home meds     #Hyponatremia  - sodium 125 today  - f/u urine lytes  - fluid restrict  - f/u cortisol    #Hypocalcemia  - started on calcium cabronate 1250mg BID  - f/u vitD and PTh levels    #Rheumatoid arthritis on prednisone   #HX of DVT s/p IVC filter   #Osteoporosis   - Duplex with chronic dvt b/l LE   - A1c/lipid profile - both well controlled   - c/w home meds     #DVT - IVC filter  #Code - Full code     Pending: PYP scan, monitor bp, f/u rpt bmp for Na

## 2024-05-23 NOTE — PROGRESS NOTE ADULT - SUBJECTIVE AND OBJECTIVE BOX
24H events:    Patient is a 68y old Female who presents with a chief complaint of   Primary diagnosis of Hypertensive emergency    Today is hospital day 4d. This morning patient was seen and examined at bedside, resting comfortably in bed.    No acute or major events overnight.    PAST MEDICAL & SURGICAL HISTORY    SOCIAL HISTORY:  Social History:      ALLERGIES:  No Known Allergies    MEDICATIONS:  STANDING MEDICATIONS  aspirin  chewable 81 milliGRAM(s) Oral daily  calcium carbonate   1250 mG (OsCal) 1 Tablet(s) Oral two times a day  chlorhexidine 2% Cloths 1 Application(s) Topical <User Schedule>  hydrALAZINE 25 milliGRAM(s) Oral every 8 hours  isosorbide   dinitrate Tablet (ISORDIL) 10 milliGRAM(s) Oral three times a day  labetalol 200 milliGRAM(s) Oral every 8 hours  losartan 25 milliGRAM(s) Oral at bedtime  pantoprazole    Tablet 40 milliGRAM(s) Oral before breakfast  predniSONE   Tablet 10 milliGRAM(s) Oral daily    PRN MEDICATIONS  acetaminophen     Tablet .. 650 milliGRAM(s) Oral every 6 hours PRN  aluminum hydroxide/magnesium hydroxide/simethicone Suspension 30 milliLiter(s) Oral every 4 hours PRN  melatonin 3 milliGRAM(s) Oral at bedtime PRN  ondansetron Injectable 4 milliGRAM(s) IV Push every 8 hours PRN    VITALS:   T(F): 97.5  HR: 97  BP: 125/79  RR: 18  SpO2: 100%    PHYSICAL EXAM:  GENERAL:   ( x ) NAD, lying in bed comfortably     (  ) obtunded     (  ) lethargic     (  ) somnolent    HEAD:   ( x ) Atraumatic     (  ) hematoma     (  ) laceration (specify location:       )     NECK:  (  ) Supple     (  ) neck stiffness     (  ) nuchal rigidity     (  )  no JVD     (  ) JVD present ( -- cm)    HEART:  Rate -->     ( x ) normal rate     (  ) bradycardic     (  ) tachycardic  Rhythm -->     ( x ) regular     (  ) regularly irregular     (  ) irregularly irregular  Murmurs -->     ( x ) normal s1s2     (  ) systolic murmur     (  ) diastolic murmur     (  ) continuous murmur      (  ) S3 present     (  ) S4 present    LUNGS:   ( x )Unlabored respirations     (  ) tachypnea  ( x ) B/L air entry     (  ) decreased breath sounds in:  (location     )    (  ) no adventitious sound     (  ) crackles     (  ) wheezing      (  ) rhonchi      (specify location:       )  (  ) chest wall tenderness (specify location:       )    ABDOMEN:   ( x ) Soft     (  ) tense   |   ( x ) nondistended     (  ) distended   |   (  ) +BS     (  ) hypoactive bowel sounds     (  ) hyperactive bowel sounds  (  ) nontender     (  ) RUQ tenderness     (  ) RLQ tenderness     (  ) LLQ tenderness     (  ) epigastric tenderness     (  ) diffuse tenderness  (  ) Splenomegaly      (  ) Hepatomegaly      (  ) Jaundice     (  ) ecchymosis     EXTREMITIES:  ( x ) Normal     (  ) Rash     (  ) ecchymosis     (  ) varicose veins      (  ) pitting edema     (  ) non-pitting edema   (  ) ulceration     (  ) gangrene:     (location:     )    NERVOUS SYSTEM:    ( x ) A&Ox3     (  ) confused     (  ) lethargic  CN II-XII:     (  ) Intact     (  ) deficits found     (Specify:     )   Upper extremities:     (  ) no sensorimotor deficits     (  ) weakness     (  ) loss of proprioception/vibration     (  ) loss of touch/temperature (specify:    )  Lower extremities:     (  ) no sensorimotor deficits     (  ) weakness     (  ) loss of proprioception/vibration     (  ) loss of touch/temperature (specify:    )    (  ) Indwelling Gonzales Catheter:   Date insterted:    Reason (  ) Critical illness     (  ) urinary retention    (  ) Accurate Ins/Outs Monitoring     (  ) CMO patient    (  ) Central Line:   Date inserted:  Location: (  ) Right IJ     (  ) Left IJ     (  ) Right Fem     (  ) Left Fem    (  ) SPC        (  ) pigtail       (  ) PEG tube       (  ) colostomy       (  ) jejunostomy  (  ) U-Dall    LABS:                        9.1    6.01  )-----------( 121      ( 23 May 2024 05:28 )             27.5     05-23    125<L>  |  94<L>  |  55<H>  ----------------------------<  94  4.8   |  27  |  2.3<H>    Ca    6.7<L>      23 May 2024 05:28  Mg     2.8     05-23    TPro  4.6<L>  /  Alb  2.9<L>  /  TBili  0.2  /  DBili  x   /  AST  14  /  ALT  8   /  AlkPhos  54  05-23      Urinalysis Basic - ( 23 May 2024 05:28 )    Color: x / Appearance: x / SG: x / pH: x  Gluc: 94 mg/dL / Ketone: x  / Bili: x / Urobili: x   Blood: x / Protein: x / Nitrite: x   Leuk Esterase: x / RBC: x / WBC x   Sq Epi: x / Non Sq Epi: x / Bacteria: x                RADIOLOGY:

## 2024-05-23 NOTE — PROGRESS NOTE ADULT - SUBJECTIVE AND OBJECTIVE BOX
NGHIA OCONNOR  68y  [facility]      Patient is a 68y old  Female who presents with a chief complaint of     INTERVAL HPI/OVERNIGHT EVENTS:        REVIEW OF SYSTEMS:        FAMILY HISTORY:    T(C): 36.4 (05-23-24 @ 05:02), Max: 36.9 (05-23-24 @ 00:00)  HR: 97 (05-23-24 @ 07:44) (72 - 97)  BP: 125/79 (05-23-24 @ 07:44) (113/70 - 177/82)  RR: 18 (05-23-24 @ 05:02) (18 - 18)  SpO2: 100% (05-23-24 @ 07:44) (100% - 100%)  Wt(kg): --Vital Signs Last 24 Hrs  T(C): 36.4 (23 May 2024 05:02), Max: 36.9 (23 May 2024 00:00)  T(F): 97.5 (23 May 2024 05:02), Max: 98.4 (23 May 2024 00:00)  HR: 97 (23 May 2024 07:44) (72 - 97)  BP: 125/79 (23 May 2024 07:44) (113/70 - 177/82)  BP(mean): --  RR: 18 (23 May 2024 05:02) (18 - 18)  SpO2: 100% (23 May 2024 07:44) (100% - 100%)    Parameters below as of 23 May 2024 07:44  Patient On (Oxygen Delivery Method): room air        PHYSICAL EXAM:  GENERAL: NAD, well-groomed, well-developed  HEAD:  Atraumatic, Normocephalic  EYES: EOMI, PERRLA, conjunctiva and sclera clear  ENMT: No tonsillar erythema, exudates, or enlargement; Moist mucous membranes, Good dentition, No lesions  NECK: Supple, No JVD, Normal thyroid  NERVOUS SYSTEM:  Alert & Oriented X3, Good concentration; Motor Strength 5/5 B/L upper and lower extremities; DTRs 2+ intact and symmetric  PULM: Clear to auscultation bilaterally  CARDIAC: Regular rate and rhythm; No murmurs, rubs, or gallops  GI: Soft, Nontender, Nondistended; Bowel sounds present  EXTREMITIES:  2+ Peripheral Pulses, No clubbing, cyanosis, or edema  LYMPH: No lymphadenopathy noted  SKIN: No rashes or lesions    Consultant(s) Notes Reviewed:  [x ] YES  [ ] NO  Care Discussed with Consultants/Other Providers [ x] YES  [ ] NO    LABS:                            9.1    6.01  )-----------( 121      ( 23 May 2024 05:28 )             27.5   05-23    125<L>  |  94<L>  |  55<H>  ----------------------------<  94  4.8   |  27  |  2.3<H>    Ca    6.7<L>      23 May 2024 05:28  Mg     2.8     05-23    TPro  4.6<L>  /  Alb  2.9<L>  /  TBili  0.2  /  DBili  x   /  AST  14  /  ALT  8   /  AlkPhos  54  05-23            acetaminophen     Tablet .. 650 milliGRAM(s) Oral every 6 hours PRN  aluminum hydroxide/magnesium hydroxide/simethicone Suspension 30 milliLiter(s) Oral every 4 hours PRN  aspirin  chewable 81 milliGRAM(s) Oral daily  calcium carbonate   1250 mG (OsCal) 1 Tablet(s) Oral two times a day  hydrALAZINE 25 milliGRAM(s) Oral every 8 hours  isosorbide   dinitrate Tablet (ISORDIL) 10 milliGRAM(s) Oral three times a day  labetalol 200 milliGRAM(s) Oral every 8 hours  losartan 25 milliGRAM(s) Oral at bedtime  melatonin 3 milliGRAM(s) Oral at bedtime PRN  ondansetron Injectable 4 milliGRAM(s) IV Push every 8 hours PRN  pantoprazole    Tablet 40 milliGRAM(s) Oral before breakfast  predniSONE   Tablet 10 milliGRAM(s) Oral daily      68-year-old female with a past medical history of hypertension, Osteoporosis, DVT not on AC s/p IVC filter (still present), Rheumatoid arthritis, SLE on Prednisone/Enbrel who presents to the ED for evaluation of refractory HTN    1. 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 CKD secondary to amyloid disease  - Supportive care     3. Possible acute HFmrEF complicated by demand ischemia   - CE stable   - TTE - she did a TTE at [facility] and recently and showed  EF 75% - septal LVH - G1DD - hyperdynamic LVSF/no wall motion abn - mild valvular abn   - TTE here with EF 45-50 % , BNP 12k   - s/p IV Lasix dose x2   - Cardio consult for new heart failure, hx of amyloidosis  - advise obtaining hx regarding amyloidosis, and Stress Test   - c/w home lasix 20   - c/w home meds     3.Rheumatoid arthritis on prednisone . HX of DVT s/p IVC filter   #Osteoporosis   - Duplex with chronic dvt b/l LE   - A1c/lipid profile - both well controlled   - c/w home meds     #DVT - IVC filter  #Code - Full code     Pending: monitor 24hrs for BP, f/u urine metanephrines, aldosterone, renin levels and amyloidosis w/up     NGHIA OCONNOR  68y  [facility]      Patient is a 68y old  Female who presents with a chief complaint of     INTERVAL HPI/OVERNIGHT EVENTS:    Patient feels well  she has no complains. denies nausea  no overnight events  family at the bedside updated about the plan       FAMILY HISTORY:    T(C): 36.4 (05-23-24 @ 05:02), Max: 36.9 (05-23-24 @ 00:00)  HR: 97 (05-23-24 @ 07:44) (72 - 97)  BP: 125/79 (05-23-24 @ 07:44) (113/70 - 177/82)  RR: 18 (05-23-24 @ 05:02) (18 - 18)  SpO2: 100% (05-23-24 @ 07:44) (100% - 100%)  Wt(kg): --Vital Signs Last 24 Hrs  T(C): 36.4 (23 May 2024 05:02), Max: 36.9 (23 May 2024 00:00)  T(F): 97.5 (23 May 2024 05:02), Max: 98.4 (23 May 2024 00:00)  HR: 97 (23 May 2024 07:44) (72 - 97)  BP: 125/79 (23 May 2024 07:44) (113/70 - 177/82)  BP(mean): --  RR: 18 (23 May 2024 05:02) (18 - 18)  SpO2: 100% (23 May 2024 07:44) (100% - 100%)    Parameters below as of 23 May 2024 07:44  Patient On (Oxygen Delivery Method): room air        PHYSICAL EXAM:  GENERAL: NAD, well-groomed, well-developed  NERVOUS SYSTEM:  Alert & Oriented X3,   PULM: Clear to auscultation bilaterally  CARDIAC: Regular rate and rhythm;  GI: Soft, Nontender, Nondistended; Bowel sounds present  EXTREMITIES:  2+ Peripheral Pulses,    Consultant(s) Notes Reviewed:  [x ] YES  [ ] NO  Care Discussed with Consultants/Other Providers [ x] YES  [ ] NO    LABS:                            9.1    6.01  )-----------( 121      ( 23 May 2024 05:28 )             27.5   05-23    125<L>  |  94<L>  |  55<H>  ----------------------------<  94  4.8   |  27  |  2.3<H>    Ca    6.7<L>      23 May 2024 05:28  Mg     2.8     05-23    TPro  4.6<L>  /  Alb  2.9<L>  /  TBili  0.2  /  DBili  x   /  AST  14  /  ALT  8   /  AlkPhos  54  05-23            acetaminophen     Tablet .. 650 milliGRAM(s) Oral every 6 hours PRN  aluminum hydroxide/magnesium hydroxide/simethicone Suspension 30 milliLiter(s) Oral every 4 hours PRN  aspirin  chewable 81 milliGRAM(s) Oral daily  calcium carbonate   1250 mG (OsCal) 1 Tablet(s) Oral two times a day  hydrALAZINE 25 milliGRAM(s) Oral every 8 hours  isosorbide   dinitrate Tablet (ISORDIL) 10 milliGRAM(s) Oral three times a day  labetalol 200 milliGRAM(s) Oral every 8 hours  losartan 25 milliGRAM(s) Oral at bedtime  melatonin 3 milliGRAM(s) Oral at bedtime PRN  ondansetron Injectable 4 milliGRAM(s) IV Push every 8 hours PRN  pantoprazole    Tablet 40 milliGRAM(s) Oral before breakfast  predniSONE   Tablet 10 milliGRAM(s) Oral daily      68-year-old female with a past medical history of hypertension, Osteoporosis, DVT not on AC s/p IVC filter (still present), Rheumatoid arthritis, SLE on Prednisone/Enbrel who presents to the ED for evaluation of refractory HTN    1. HTN Urgency- improved   * Frequent Sleep Apneas at night - high STOP/BANG score   - currently -140s , but runs 200s in AM   - losartan switched to night time  - labetalol changed to 200 TID as per cardio recs  - Renal doppler negative   - Urine metanephrine:pending            - Aldosterone:WNL             - Renin:WNL   - stress test normal  - consider consult Pulm and f/u OP with pulmonary for sleep study    2. CKD secondary to amyloid disease  - Supportive care     3. Possible acute HFmrEF complicated by demand ischemia   - CE stable   - TTE - she did a TTE at [facility] and recently and showed  EF 75% - septal LVH - G1DD - hyperdynamic LVSF/no wall motion abn - mild valvular abn   - TTE here with EF 45-50 % , BNP 12k   - s/p IV Lasix dose x2. home lasix 20    - Cardio consult for new heart failure, hx of amyloidosis  - advise obtaining hx regarding amyloidosis, and Stress Test   - c/w home meds     3.Rheumatoid arthritis on prednisone . HX of DVT s/p IVC filter/ Osteoporosis   - Duplex with chronic dvt b/l LE   - A1c/lipid profile - both well controlled   - c/w home meds     4. Acute on top of chronic hyponatremia  - TSH:WNL        -Cortisol :pending  - Serum osm:pending        - Urine osm:pending        - Urine Na:pending   - Limit water intake   - Re-check bmp     #DVT - IVC filter  #Code - Full code

## 2024-05-24 VITALS
TEMPERATURE: 99 F | OXYGEN SATURATION: 99 % | RESPIRATION RATE: 18 BRPM | HEART RATE: 97 BPM | DIASTOLIC BLOOD PRESSURE: 60 MMHG | SYSTOLIC BLOOD PRESSURE: 101 MMHG

## 2024-05-24 LAB
24R-OH-CALCIDIOL SERPL-MCNC: 26 NG/ML — LOW (ref 30–80)
ALBUMIN SERPL ELPH-MCNC: 2.8 G/DL — LOW (ref 3.5–5.2)
ALP SERPL-CCNC: 50 U/L — SIGNIFICANT CHANGE UP (ref 30–115)
ALT FLD-CCNC: 7 U/L — SIGNIFICANT CHANGE UP (ref 0–41)
ANION GAP SERPL CALC-SCNC: 9 MMOL/L — SIGNIFICANT CHANGE UP (ref 7–14)
AST SERPL-CCNC: 13 U/L — SIGNIFICANT CHANGE UP (ref 0–41)
BASOPHILS # BLD AUTO: 0.01 K/UL — SIGNIFICANT CHANGE UP (ref 0–0.2)
BASOPHILS NFR BLD AUTO: 0.2 % — SIGNIFICANT CHANGE UP (ref 0–1)
BILIRUB SERPL-MCNC: 0.2 MG/DL — SIGNIFICANT CHANGE UP (ref 0.2–1.2)
BUN SERPL-MCNC: 54 MG/DL — HIGH (ref 10–20)
CALCIUM SERPL-MCNC: 6.6 MG/DL — LOW (ref 8.4–10.5)
CALCIUM SERPL-MCNC: 6.7 MG/DL — LOW (ref 8.4–10.5)
CHLORIDE SERPL-SCNC: 98 MMOL/L — SIGNIFICANT CHANGE UP (ref 98–110)
CO2 SERPL-SCNC: 22 MMOL/L — SIGNIFICANT CHANGE UP (ref 17–32)
CREAT SERPL-MCNC: 2.1 MG/DL — HIGH (ref 0.7–1.5)
CREATININE, RNDM UR: 21.1 MG/DL — SIGNIFICANT CHANGE UP
EGFR: 25 ML/MIN/1.73M2 — LOW
EOSINOPHIL # BLD AUTO: 0.07 K/UL — SIGNIFICANT CHANGE UP (ref 0–0.7)
EOSINOPHIL NFR BLD AUTO: 1.4 % — SIGNIFICANT CHANGE UP (ref 0–8)
GLUCOSE SERPL-MCNC: 105 MG/DL — HIGH (ref 70–99)
HCT VFR BLD CALC: 26.7 % — LOW (ref 37–47)
HGB BLD-MCNC: 9 G/DL — LOW (ref 12–16)
IMM GRANULOCYTES NFR BLD AUTO: 0.2 % — SIGNIFICANT CHANGE UP (ref 0.1–0.3)
LYMPHOCYTES # BLD AUTO: 0.75 K/UL — LOW (ref 1.2–3.4)
LYMPHOCYTES # BLD AUTO: 14.9 % — LOW (ref 20.5–51.1)
MAGNESIUM SERPL-MCNC: 1.9 MG/DL — SIGNIFICANT CHANGE UP (ref 1.8–2.4)
MCHC RBC-ENTMCNC: 31.3 PG — HIGH (ref 27–31)
MCHC RBC-ENTMCNC: 33.7 G/DL — SIGNIFICANT CHANGE UP (ref 32–37)
MCV RBC AUTO: 92.7 FL — SIGNIFICANT CHANGE UP (ref 81–99)
METANEPHS 24H UR-MCNC: 0.7 — SIGNIFICANT CHANGE UP (ref 0–1)
METANEPHS 24H UR-MCNC: 34 UG/L — SIGNIFICANT CHANGE UP
MONOCYTES # BLD AUTO: 0.41 K/UL — SIGNIFICANT CHANGE UP (ref 0.1–0.6)
MONOCYTES NFR BLD AUTO: 8.2 % — SIGNIFICANT CHANGE UP (ref 1.7–9.3)
NEUTROPHILS # BLD AUTO: 3.77 K/UL — SIGNIFICANT CHANGE UP (ref 1.4–6.5)
NEUTROPHILS NFR BLD AUTO: 75.1 % — SIGNIFICANT CHANGE UP (ref 42.2–75.2)
NORMETANEPHRINE.: 98 UG/L — SIGNIFICANT CHANGE UP
NRBC # BLD: 0 /100 WBCS — SIGNIFICANT CHANGE UP (ref 0–0)
PLATELET # BLD AUTO: 122 K/UL — LOW (ref 130–400)
PMV BLD: 9.9 FL — SIGNIFICANT CHANGE UP (ref 7.4–10.4)
POTASSIUM SERPL-MCNC: 5.1 MMOL/L — HIGH (ref 3.5–5)
POTASSIUM SERPL-SCNC: 5.1 MMOL/L — HIGH (ref 3.5–5)
PROT SERPL-MCNC: 4.3 G/DL — LOW (ref 6–8)
PTH-INTACT FLD-MCNC: 479 PG/ML — HIGH (ref 15–65)
RBC # BLD: 2.88 M/UL — LOW (ref 4.2–5.4)
RBC # FLD: 14.4 % — SIGNIFICANT CHANGE UP (ref 11.5–14.5)
SODIUM SERPL-SCNC: 129 MMOL/L — LOW (ref 135–146)
VIT D25+D1,25 OH+D1,25 PNL SERPL-MCNC: 47.7 PG/ML — SIGNIFICANT CHANGE UP (ref 19.9–79.3)
WBC # BLD: 5.02 K/UL — SIGNIFICANT CHANGE UP (ref 4.8–10.8)
WBC # FLD AUTO: 5.02 K/UL — SIGNIFICANT CHANGE UP (ref 4.8–10.8)

## 2024-05-24 PROCEDURE — 99239 HOSP IP/OBS DSCHRG MGMT >30: CPT

## 2024-05-24 RX ORDER — ISOSORBIDE DINITRATE 5 MG/1
1 TABLET ORAL
Qty: 90 | Refills: 0
Start: 2024-05-24 | End: 2024-06-22

## 2024-05-24 RX ORDER — HYDRALAZINE HCL 50 MG
1 TABLET ORAL
Qty: 90 | Refills: 0
Start: 2024-05-24 | End: 2024-06-22

## 2024-05-24 RX ORDER — CHOLECALCIFEROL (VITAMIN D3) 125 MCG
1 CAPSULE ORAL
Qty: 30 | Refills: 0
Start: 2024-05-24 | End: 2024-06-22

## 2024-05-24 RX ORDER — CALCIUM CARBONATE 500(1250)
1 TABLET ORAL
Qty: 60 | Refills: 0
Start: 2024-05-24 | End: 2024-06-22

## 2024-05-24 RX ORDER — SODIUM ZIRCONIUM CYCLOSILICATE 10 G/10G
5 POWDER, FOR SUSPENSION ORAL ONCE
Refills: 0 | Status: COMPLETED | OUTPATIENT
Start: 2024-05-24 | End: 2024-05-24

## 2024-05-24 RX ORDER — ASPIRIN/CALCIUM CARB/MAGNESIUM 324 MG
1 TABLET ORAL
Qty: 30 | Refills: 0
Start: 2024-05-24 | End: 2024-06-22

## 2024-05-24 RX ORDER — LABETALOL HCL 100 MG
1 TABLET ORAL
Qty: 90 | Refills: 0
Start: 2024-05-24 | End: 2024-06-22

## 2024-05-24 RX ADMIN — ISOSORBIDE DINITRATE 10 MILLIGRAM(S): 5 TABLET ORAL at 05:46

## 2024-05-24 RX ADMIN — Medication 1 TABLET(S): at 05:44

## 2024-05-24 RX ADMIN — Medication 10 MILLIGRAM(S): at 05:45

## 2024-05-24 RX ADMIN — Medication 200 MILLIGRAM(S): at 05:45

## 2024-05-24 RX ADMIN — Medication 25 MILLIGRAM(S): at 05:45

## 2024-05-24 RX ADMIN — SODIUM ZIRCONIUM CYCLOSILICATE 10 GRAM(S): 10 POWDER, FOR SUSPENSION ORAL at 00:25

## 2024-05-24 RX ADMIN — Medication 200 MILLIGRAM(S): at 14:06

## 2024-05-24 RX ADMIN — HEPARIN SODIUM 5000 UNIT(S): 5000 INJECTION INTRAVENOUS; SUBCUTANEOUS at 05:45

## 2024-05-24 RX ADMIN — CHLORHEXIDINE GLUCONATE 1 APPLICATION(S): 213 SOLUTION TOPICAL at 06:11

## 2024-05-24 RX ADMIN — Medication 650 MILLIGRAM(S): at 05:44

## 2024-05-24 RX ADMIN — Medication 25 MILLIGRAM(S): at 14:06

## 2024-05-24 RX ADMIN — SODIUM ZIRCONIUM CYCLOSILICATE 5 GRAM(S): 10 POWDER, FOR SUSPENSION ORAL at 10:43

## 2024-05-24 RX ADMIN — PANTOPRAZOLE SODIUM 40 MILLIGRAM(S): 20 TABLET, DELAYED RELEASE ORAL at 05:45

## 2024-05-24 RX ADMIN — Medication 81 MILLIGRAM(S): at 12:40

## 2024-05-24 NOTE — PROGRESS NOTE ADULT - PROVIDER SPECIALTY LIST ADULT
Cardiology
Internal Medicine
Hospitalist
Internal Medicine

## 2024-05-24 NOTE — PHYSICAL THERAPY INITIAL EVALUATION ADULT - ADDITIONAL COMMENTS
Pt lives with family with steps however pt does not negotiate. Pt's family assists her with ambulation with RW, dressings, showering with shower chair. Pt states her daughter visits her everyday.

## 2024-05-24 NOTE — PHYSICAL THERAPY INITIAL EVALUATION ADULT - PERTINENT HX OF CURRENT PROBLEM, REHAB EVAL
68-year-old female with a past medical history of hypertension, Osteoporosis, DVT not on AC s/p IVC filter (still present), Rheumatoid arthritis, SLE on Prednisone/Enbrel who presents to the ED for evaluation of refractory HTN. Per family, patient has been having elevated blood pressure since 5 days ago; baseline blood pressure is 150 systolic, but blood pressure has been in the 200s systolic. Also endorses dizziness that started earlier today; intermittent and only occurs with movement. Also endorses chest pain that started earlier today; pain is in the middle of the chest, nonradiating, nonexertional, dull, and there is no alleviating worsening. Also endorses shortness of breath along with her symptoms.

## 2024-05-24 NOTE — PHYSICAL THERAPY INITIAL EVALUATION ADULT - GENERAL OBSERVATIONS, REHAB EVAL
8:15-9:05 Pt encountered semi sanchez in bed in NAD with +call bell, +bed rails, +bed alarm, +primafit, +tele, pt Francois speaking,  José Miguel #[id number], pt agreeable to therapy, pt without c/o dizziness and SBP remaining below 150 throughout session.

## 2024-05-24 NOTE — PROGRESS NOTE ADULT - SUBJECTIVE AND OBJECTIVE BOX
24H events:    Patient is a 68y old Female who presents with a chief complaint of   Primary diagnosis of Hypertensive emergency    Today is hospital day 5d. This morning patient was seen and examined at bedside, resting comfortably in bed.    No acute or major events overnight.    PAST MEDICAL & SURGICAL HISTORY    SOCIAL HISTORY:  Social History:      ALLERGIES:  No Known Allergies    MEDICATIONS:  STANDING MEDICATIONS  aspirin  chewable 81 milliGRAM(s) Oral daily  calcium carbonate   1250 mG (OsCal) 1 Tablet(s) Oral two times a day  chlorhexidine 2% Cloths 1 Application(s) Topical <User Schedule>  heparin   Injectable 5000 Unit(s) SubCutaneous every 12 hours  hydrALAZINE 25 milliGRAM(s) Oral every 8 hours  isosorbide   dinitrate Tablet (ISORDIL) 10 milliGRAM(s) Oral three times a day  labetalol 200 milliGRAM(s) Oral every 8 hours  losartan 25 milliGRAM(s) Oral at bedtime  pantoprazole    Tablet 40 milliGRAM(s) Oral before breakfast  predniSONE   Tablet 10 milliGRAM(s) Oral daily  sodium chloride 0.9%. 1000 milliLiter(s) IV Continuous <Continuous>    PRN MEDICATIONS  acetaminophen     Tablet .. 650 milliGRAM(s) Oral every 6 hours PRN  aluminum hydroxide/magnesium hydroxide/simethicone Suspension 30 milliLiter(s) Oral every 4 hours PRN  melatonin 3 milliGRAM(s) Oral at bedtime PRN  ondansetron Injectable 4 milliGRAM(s) IV Push every 8 hours PRN    VITALS:   T(F): 98  HR: 75  BP: 165/89  RR: 18  SpO2: --    PHYSICAL EXAM:  GENERAL:   ( x ) NAD, lying in bed comfortably     (  ) obtunded     (  ) lethargic     (  ) somnolent    HEAD:   ( x ) Atraumatic     (  ) hematoma     (  ) laceration (specify location:       )     NECK:  (  ) Supple     (  ) neck stiffness     (  ) nuchal rigidity     (  )  no JVD     (  ) JVD present ( -- cm)    HEART:  Rate -->     ( x ) normal rate     (  ) bradycardic     (  ) tachycardic  Rhythm -->     ( x ) regular     (  ) regularly irregular     (  ) irregularly irregular  Murmurs -->     ( x ) normal s1s2     (  ) systolic murmur     (  ) diastolic murmur     (  ) continuous murmur      (  ) S3 present     (  ) S4 present    LUNGS:   ( x )Unlabored respirations     (  ) tachypnea  ( x ) B/L air entry     (  ) decreased breath sounds in:  (location     )    (  ) no adventitious sound     (  ) crackles     (  ) wheezing      (  ) rhonchi      (specify location:       )  (  ) chest wall tenderness (specify location:       )    ABDOMEN:   ( x ) Soft     (  ) tense   |   ( x ) nondistended     (  ) distended   |   (  ) +BS     (  ) hypoactive bowel sounds     (  ) hyperactive bowel sounds  (  ) nontender     (  ) RUQ tenderness     (  ) RLQ tenderness     (  ) LLQ tenderness     (  ) epigastric tenderness     (  ) diffuse tenderness  (  ) Splenomegaly      (  ) Hepatomegaly      (  ) Jaundice     (  ) ecchymosis     EXTREMITIES:  ( x ) Normal     (  ) Rash     (  ) ecchymosis     (  ) varicose veins      (  ) pitting edema     (  ) non-pitting edema   (  ) ulceration     (  ) gangrene:     (location:     )    NERVOUS SYSTEM:    ( x ) A&Ox3     (  ) confused     (  ) lethargic  CN II-XII:     (  ) Intact     (  ) deficits found     (Specify:     )   Upper extremities:     (  ) no sensorimotor deficits     (  ) weakness     (  ) loss of proprioception/vibration     (  ) loss of touch/temperature (specify:    )  Lower extremities:     (  ) no sensorimotor deficits     (  ) weakness     (  ) loss of proprioception/vibration     (  ) loss of touch/temperature (specify:    )    (  ) Indwelling Gonzales Catheter:   Date insterted:    Reason (  ) Critical illness     (  ) urinary retention    (  ) Accurate Ins/Outs Monitoring     (  ) CMO patient    (  ) Central Line:   Date inserted:  Location: (  ) Right IJ     (  ) Left IJ     (  ) Right Fem     (  ) Left Fem    (  ) SPC        (  ) pigtail       (  ) PEG tube       (  ) colostomy       (  ) jejunostomy  (  ) U-Dall    LABS:                        9.0    5.02  )-----------( 122      ( 24 May 2024 06:59 )             26.7     05-24    129<L>  |  98  |  54<H>  ----------------------------<  105<H>  5.1<H>   |  22  |  2.1<H>    Ca    6.6<L>      24 May 2024 06:59  Mg     1.9     05-24    TPro  4.3<L>  /  Alb  2.8<L>  /  TBili  0.2  /  DBili  x   /  AST  13  /  ALT  7   /  AlkPhos  50  05-24      Urinalysis Basic - ( 24 May 2024 06:59 )    Color: x / Appearance: x / SG: x / pH: x  Gluc: 105 mg/dL / Ketone: x  / Bili: x / Urobili: x   Blood: x / Protein: x / Nitrite: x   Leuk Esterase: x / RBC: x / WBC x   Sq Epi: x / Non Sq Epi: x / Bacteria: x                RADIOLOGY:

## 2024-05-24 NOTE — PROGRESS NOTE ADULT - ASSESSMENT
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   #Frequent Sleep Apneas at night - high STOP/BANG score   - currently -140s , but runs 170s in AM - losartan switched to night time, labetalol changed to 200 TID as per cardio recs  - 2/2 htn workup sent - renal doppler negative, f/u urine metanephrines, renin and aldosterone   - PYP scan for amyloidosis - negative for amyloidosis  - consider consult Pulm and f/u OP with pulmonary for sleep study  - stress test normal    #HFmrEF  #Mild volume overload with JVD present   #NSTEMI   - CE stable   - TTE - she did a TTE at [facility] and recently and showed  EF 75% - septal LVH - G1DD - hyperdynamic LVSF/no wall motion abn - mild valvular abn   - TTE here with EF 45-50 % , BNP 12k   - s/p IV Lasix dose x2   - Cardio consult for new heart failure, hx of amyloidosis  - c/w home lasix 20   - c/w home meds     #Hyponatremia  - sodium 125- now 129 s/p NS  - f/u urine lytes  - fluid restrict  - f/u cortisol    #Hypocalcemia  - started on calcium cabronate 1250mg BID  - f/u vitD and PTh levels    #Rheumatoid arthritis on prednisone   #HX of DVT s/p IVC filter   #Osteoporosis   - Duplex with chronic dvt b/l LE   - A1c/lipid profile - both well controlled   - c/w home meds     #DVT - IVC filter  #Code - Full code     Pending: bp control, dc today

## 2024-05-24 NOTE — PHYSICAL THERAPY INITIAL EVALUATION ADULT - RANGE OF MOTION EXAMINATION, REHAB EVAL
Pt has RA with contractures in BLE and BUE however is functional/bilateral upper extremity ROM was WFL (within functional limits)/bilateral lower extremity ROM was WFL (within functional limits)

## 2024-05-24 NOTE — PROGRESS NOTE ADULT - ATTENDING COMMENTS
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   #Frequent Sleep Apneas at night - high STOP/BANG score   - currently -140s , but runs 200s overnight   - cont Telemetry   - monitor BP for control for full 24 hours   - 2/2 htn workup sent   - cardio consult noted - will start hydralazine and isosorbide for additional BP control, agree with 2/2 htn workup, needs further hx regarding amyloidosis   - consider consult Pulm and f/u OP with pulmonary for sleep study     #HFmrEF  #Mild volume overload with JVD present   #NSTEMI   - CE stable   - TTE - she did a TTE at [facility] and recently and showed  EF 75% - septal LVH - G1DD - hyperdynamic LVSF/no wall motion abn - mild valvular abn   - TTE here with EF 45-50 % , BNP 12k   - s/p IV Lasix dose x2   - Cardio consult for new heart failure, hx of amyloidosis- advise obtaining hx regarding amyloidosis, and Stress Test   - c/w home lasix 20   - c/w home meds     #Rheumatoid arthritis on prednisone   #HX of DVT s/p IVC filter   #Osteoporosis   - Duplex with chronic dvt b/l LE   - A1c/lipid profile - both well controlled   - c/w home meds     #DVT - IVC filter    #Code - Full code     Dispo: workup for new uncontrolled htn , stress test pending , monitor BP and monitor telemetry       Total time spent to complete patient's bedside assessment, review medical chart, discuss medical plan of care with covering medical team was more than 50 minutes  with >50% of time spent face to face with patient, discussion with patient/family and/or coordination of care. My note supersedes them medical resident note in case of discrepancy.      Filomena Braden DO .
I saw and examined the patient at bedside on 5/24.   She feels better, still with headache, no chest pain.     PHYSICAL EXAM:  GENERAL: NAD, well-developed.  HEAD:  Atraumatic, Normocephalic.  EYES: EOMI, PERRLA, conjunctiva and sclera clear.  NECK: Supple, No JVD.  CHEST/LUNG: Clear to auscultation bilaterally; No wheeze.  HEART: Regular rate and rhythm; S1 S2.   ABDOMEN: Soft, Nontender, Nondistended; Bowel sounds present.  EXTREMITIES:  2+ Peripheral Pulses, No clubbing, cyanosis, or edema.  PSYCH: AAOx3.  NEUROLOGY: non-focal.  SKIN: No rashes or lesions.    A/P:   Hypertensive Urgency:   BP is better controlled  Continue Losartan, Labetalol, Hydralazine and Imdur    Acute HFmrEF:   Possibly related to uncontrolled HTN  Seen by cardiology,     Hyponatremia:   Possibly due to SIADH, improved.
I saw and examined the patient at bedside.   She feels better, still with headache, no chest pain.     PHYSICAL EXAM:  GENERAL: NAD, well-developed.  HEAD:  Atraumatic, Normocephalic.  EYES: EOMI, PERRLA, conjunctiva and sclera clear.  NECK: Supple, No JVD.  CHEST/LUNG: Clear to auscultation bilaterally; No wheeze.  HEART: Regular rate and rhythm; S1 S2.   ABDOMEN: Soft, Nontender, Nondistended; Bowel sounds present.  EXTREMITIES:  2+ Peripheral Pulses, No clubbing, cyanosis, or edema.  PSYCH: AAOx3.  NEUROLOGY: non-focal.  SKIN: No rashes or lesions.    A/P:   Hypertensive Urgency:   BP was elevated on AM, switch Losartan to PM  Continue Labetalol, Hydralazine and   Can Add Amlodipine     Acute HFmrEF:   Possibly related to uncontrolled HTN  Seen by cardiology,
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 and recently and showed  EF 75% - septal LVH - G1DD - hyperdynamic LVSF/no wall motion abn - mild valvular abn   - TTE here with EF 45-50 % , BNP 12k   - s/p IV Lasix dose x2   - Cardio consult for new heart failure, hx of amyloidosis, uncontrolled htn   - c/w home lasix 20   - c/w home meds     #Rheumatoid arthritis on prednisone   #HX of DVT s/p IVC filter   #Osteoporosis   - Duplex with chronic dvt b/l LE   - A1c/lipid profile - both well controlled   - c/w home meds     #DVT - IVC filter    #Code - Full code     Dispo: workup for new uncontrolled htn , cardio eval , monitor BP and monitor telemetry       Total time spent to complete patient's bedside assessment, review medical chart, discuss medical plan of care with covering medical team was more than 50 minutes  with >50% of time spent face to face with patient, discussion with patient/family and/or coordination of care. My note supersedes them medical resident note in case of discrepancy.      Filomena Braden, DO

## 2024-05-26 LAB
RENIN PLAS-CCNC: 0.29 NG/ML/HR — SIGNIFICANT CHANGE UP (ref 0.17–5.38)
RENIN PLAS-CCNC: 0.51 NG/ML/HR — SIGNIFICANT CHANGE UP (ref 0.17–5.38)

## 2024-05-27 ENCOUNTER — TRANSCRIPTION ENCOUNTER (OUTPATIENT)
Age: 69
End: 2024-05-27

## 2024-05-29 DIAGNOSIS — I11.0 HYPERTENSIVE HEART DISEASE WITH HEART FAILURE: ICD-10-CM

## 2024-05-29 DIAGNOSIS — N18.4 CHRONIC KIDNEY DISEASE, STAGE 4 (SEVERE): ICD-10-CM

## 2024-05-29 DIAGNOSIS — M06.9 RHEUMATOID ARTHRITIS, UNSPECIFIED: ICD-10-CM

## 2024-05-29 DIAGNOSIS — I24.89 OTHER FORMS OF ACUTE ISCHEMIC HEART DISEASE: ICD-10-CM

## 2024-05-29 DIAGNOSIS — I82.533 CHRONIC EMBOLISM AND THROMBOSIS OF POPLITEAL VEIN, BILATERAL: ICD-10-CM

## 2024-05-29 DIAGNOSIS — E85.4 ORGAN-LIMITED AMYLOIDOSIS: ICD-10-CM

## 2024-05-29 DIAGNOSIS — Z91.81 HISTORY OF FALLING: ICD-10-CM

## 2024-05-29 DIAGNOSIS — Z79.620 LONG TERM (CURRENT) USE OF IMMUNOSUPPRESSIVE BIOLOGIC: ICD-10-CM

## 2024-05-29 DIAGNOSIS — E22.2 SYNDROME OF INAPPROPRIATE SECRETION OF ANTIDIURETIC HORMONE: ICD-10-CM

## 2024-05-29 DIAGNOSIS — E83.51 HYPOCALCEMIA: ICD-10-CM

## 2024-05-29 DIAGNOSIS — M32.9 SYSTEMIC LUPUS ERYTHEMATOSUS, UNSPECIFIED: ICD-10-CM

## 2024-05-29 DIAGNOSIS — I50.21 ACUTE SYSTOLIC (CONGESTIVE) HEART FAILURE: ICD-10-CM

## 2024-05-29 DIAGNOSIS — G47.30 SLEEP APNEA, UNSPECIFIED: ICD-10-CM

## 2024-05-29 DIAGNOSIS — I82.513 CHRONIC EMBOLISM AND THROMBOSIS OF FEMORAL VEIN, BILATERAL: ICD-10-CM

## 2024-05-29 DIAGNOSIS — Z79.52 LONG TERM (CURRENT) USE OF SYSTEMIC STEROIDS: ICD-10-CM

## 2024-05-29 DIAGNOSIS — N29 OTHER DISORDERS OF KIDNEY AND URETER IN DISEASES CLASSIFIED ELSEWHERE: ICD-10-CM

## 2024-05-29 DIAGNOSIS — I21.4 NON-ST ELEVATION (NSTEMI) MYOCARDIAL INFARCTION: ICD-10-CM

## 2024-05-29 DIAGNOSIS — I16.1 HYPERTENSIVE EMERGENCY: ICD-10-CM

## 2024-05-29 DIAGNOSIS — M81.0 AGE-RELATED OSTEOPOROSIS WITHOUT CURRENT PATHOLOGICAL FRACTURE: ICD-10-CM

## 2024-05-29 DIAGNOSIS — E87.5 HYPERKALEMIA: ICD-10-CM

## 2024-05-29 LAB — INTERPRETATION SERPL IFE-IMP: SIGNIFICANT CHANGE UP

## 2024-06-02 ENCOUNTER — INPATIENT (INPATIENT)
Facility: HOSPITAL | Age: 69
LOS: 14 days | Discharge: HOME HEALTH | DRG: 641 | End: 2024-06-17
Attending: INTERNAL MEDICINE | Admitting: INTERNAL MEDICINE
Payer: MEDICARE

## 2024-06-02 VITALS
TEMPERATURE: 99 F | SYSTOLIC BLOOD PRESSURE: 166 MMHG | RESPIRATION RATE: 19 BRPM | OXYGEN SATURATION: 96 % | HEART RATE: 69 BPM | DIASTOLIC BLOOD PRESSURE: 81 MMHG

## 2024-06-02 DIAGNOSIS — E87.70 FLUID OVERLOAD, UNSPECIFIED: ICD-10-CM

## 2024-06-02 LAB
ALBUMIN SERPL ELPH-MCNC: 3.1 G/DL — LOW (ref 3.5–5.2)
ALP SERPL-CCNC: 57 U/L — SIGNIFICANT CHANGE UP (ref 30–115)
ALT FLD-CCNC: 10 U/L — SIGNIFICANT CHANGE UP (ref 0–41)
ANION GAP SERPL CALC-SCNC: 8 MMOL/L — SIGNIFICANT CHANGE UP (ref 7–14)
APTT BLD: 18.7 SEC — CRITICAL LOW (ref 27–39.2)
AST SERPL-CCNC: 23 U/L — SIGNIFICANT CHANGE UP (ref 0–41)
BASE EXCESS BLDV CALC-SCNC: -6.7 MMOL/L — LOW (ref -2–3)
BASOPHILS # BLD AUTO: 0.02 K/UL — SIGNIFICANT CHANGE UP (ref 0–0.2)
BASOPHILS NFR BLD AUTO: 0.3 % — SIGNIFICANT CHANGE UP (ref 0–1)
BILIRUB SERPL-MCNC: 0.3 MG/DL — SIGNIFICANT CHANGE UP (ref 0.2–1.2)
BLD GP AB SCN SERPL QL: SIGNIFICANT CHANGE UP
BUN SERPL-MCNC: 39 MG/DL — HIGH (ref 10–20)
CA-I SERPL-SCNC: 1.04 MMOL/L — LOW (ref 1.15–1.33)
CALCIUM SERPL-MCNC: 7.3 MG/DL — LOW (ref 8.4–10.4)
CHLORIDE SERPL-SCNC: 95 MMOL/L — LOW (ref 98–110)
CO2 SERPL-SCNC: 19 MMOL/L — SIGNIFICANT CHANGE UP (ref 17–32)
CREAT SERPL-MCNC: 1.9 MG/DL — HIGH (ref 0.7–1.5)
EGFR: 28 ML/MIN/1.73M2 — LOW
EOSINOPHIL # BLD AUTO: 0.05 K/UL — SIGNIFICANT CHANGE UP (ref 0–0.7)
EOSINOPHIL NFR BLD AUTO: 0.8 % — SIGNIFICANT CHANGE UP (ref 0–8)
GAS PNL BLDV: 122 MMOL/L — LOW (ref 136–145)
GAS PNL BLDV: SIGNIFICANT CHANGE UP
GAS PNL BLDV: SIGNIFICANT CHANGE UP
GLUCOSE SERPL-MCNC: 82 MG/DL — SIGNIFICANT CHANGE UP (ref 70–99)
HCO3 BLDV-SCNC: 19 MMOL/L — LOW (ref 22–29)
HCT VFR BLD CALC: 25.4 % — LOW (ref 37–47)
HCT VFR BLDA CALC: 23 % — LOW (ref 34.5–46.5)
HGB BLD CALC-MCNC: 7.7 G/DL — LOW (ref 11.7–16.1)
HGB BLD-MCNC: 8.6 G/DL — LOW (ref 12–16)
IMM GRANULOCYTES NFR BLD AUTO: 0.3 % — SIGNIFICANT CHANGE UP (ref 0.1–0.3)
INR BLD: 0.94 RATIO — SIGNIFICANT CHANGE UP (ref 0.65–1.3)
LACTATE BLDV-MCNC: 0.4 MMOL/L — LOW (ref 0.5–2)
LYMPHOCYTES # BLD AUTO: 0.6 K/UL — LOW (ref 1.2–3.4)
LYMPHOCYTES # BLD AUTO: 10.2 % — LOW (ref 20.5–51.1)
MCHC RBC-ENTMCNC: 31.7 PG — HIGH (ref 27–31)
MCHC RBC-ENTMCNC: 33.9 G/DL — SIGNIFICANT CHANGE UP (ref 32–37)
MCV RBC AUTO: 93.7 FL — SIGNIFICANT CHANGE UP (ref 81–99)
MONOCYTES # BLD AUTO: 0.57 K/UL — SIGNIFICANT CHANGE UP (ref 0.1–0.6)
MONOCYTES NFR BLD AUTO: 9.6 % — HIGH (ref 1.7–9.3)
NEUTROPHILS # BLD AUTO: 4.65 K/UL — SIGNIFICANT CHANGE UP (ref 1.4–6.5)
NEUTROPHILS NFR BLD AUTO: 78.8 % — HIGH (ref 42.2–75.2)
NRBC # BLD: 0 /100 WBCS — SIGNIFICANT CHANGE UP (ref 0–0)
NT-PROBNP SERPL-SCNC: 8010 PG/ML — HIGH (ref 0–300)
PCO2 BLDV: 39 MMHG — SIGNIFICANT CHANGE UP (ref 39–42)
PH BLDV: 7.3 — LOW (ref 7.32–7.43)
PLATELET # BLD AUTO: 144 K/UL — SIGNIFICANT CHANGE UP (ref 130–400)
PMV BLD: 10 FL — SIGNIFICANT CHANGE UP (ref 7.4–10.4)
PO2 BLDV: 61 MMHG — HIGH (ref 25–45)
POTASSIUM BLDV-SCNC: 5.6 MMOL/L — HIGH (ref 3.5–5.1)
POTASSIUM SERPL-MCNC: 5.6 MMOL/L — HIGH (ref 3.5–5)
POTASSIUM SERPL-SCNC: 5.6 MMOL/L — HIGH (ref 3.5–5)
PROT SERPL-MCNC: 5.1 G/DL — LOW (ref 6–8)
PROTHROM AB SERPL-ACNC: 10.7 SEC — SIGNIFICANT CHANGE UP (ref 9.95–12.87)
RBC # BLD: 2.71 M/UL — LOW (ref 4.2–5.4)
RBC # FLD: 14.4 % — SIGNIFICANT CHANGE UP (ref 11.5–14.5)
SAO2 % BLDV: 94.3 % — HIGH (ref 67–88)
SODIUM SERPL-SCNC: 122 MMOL/L — LOW (ref 135–146)
TROPONIN T, HIGH SENSITIVITY RESULT: 65 NG/L — CRITICAL HIGH (ref 6–13)
WBC # BLD: 5.91 K/UL — SIGNIFICANT CHANGE UP (ref 4.8–10.8)
WBC # FLD AUTO: 5.91 K/UL — SIGNIFICANT CHANGE UP (ref 4.8–10.8)

## 2024-06-02 PROCEDURE — 82746 ASSAY OF FOLIC ACID SERUM: CPT

## 2024-06-02 PROCEDURE — 82803 BLOOD GASES ANY COMBINATION: CPT

## 2024-06-02 PROCEDURE — 84165 PROTEIN E-PHORESIS SERUM: CPT

## 2024-06-02 PROCEDURE — 85014 HEMATOCRIT: CPT

## 2024-06-02 PROCEDURE — 93970 EXTREMITY STUDY: CPT

## 2024-06-02 PROCEDURE — 84443 ASSAY THYROID STIM HORMONE: CPT

## 2024-06-02 PROCEDURE — 83550 IRON BINDING TEST: CPT

## 2024-06-02 PROCEDURE — 82570 ASSAY OF URINE CREATININE: CPT

## 2024-06-02 PROCEDURE — 82728 ASSAY OF FERRITIN: CPT

## 2024-06-02 PROCEDURE — 84166 PROTEIN E-PHORESIS/URINE/CSF: CPT

## 2024-06-02 PROCEDURE — 97166 OT EVAL MOD COMPLEX 45 MIN: CPT | Mod: GO

## 2024-06-02 PROCEDURE — 86140 C-REACTIVE PROTEIN: CPT

## 2024-06-02 PROCEDURE — 83880 ASSAY OF NATRIURETIC PEPTIDE: CPT

## 2024-06-02 PROCEDURE — 86038 ANTINUCLEAR ANTIBODIES: CPT

## 2024-06-02 PROCEDURE — 81001 URINALYSIS AUTO W/SCOPE: CPT

## 2024-06-02 PROCEDURE — 83605 ASSAY OF LACTIC ACID: CPT

## 2024-06-02 PROCEDURE — 82784 ASSAY IGA/IGD/IGG/IGM EACH: CPT

## 2024-06-02 PROCEDURE — 84300 ASSAY OF URINE SODIUM: CPT

## 2024-06-02 PROCEDURE — 82607 VITAMIN B-12: CPT

## 2024-06-02 PROCEDURE — 82330 ASSAY OF CALCIUM: CPT

## 2024-06-02 PROCEDURE — 84540 ASSAY OF URINE/UREA-N: CPT

## 2024-06-02 PROCEDURE — 97163 PT EVAL HIGH COMPLEX 45 MIN: CPT | Mod: GP

## 2024-06-02 PROCEDURE — 80048 BASIC METABOLIC PNL TOTAL CA: CPT

## 2024-06-02 PROCEDURE — 97110 THERAPEUTIC EXERCISES: CPT | Mod: GP

## 2024-06-02 PROCEDURE — 85730 THROMBOPLASTIN TIME PARTIAL: CPT

## 2024-06-02 PROCEDURE — 97535 SELF CARE MNGMENT TRAINING: CPT | Mod: GO

## 2024-06-02 PROCEDURE — 84156 ASSAY OF PROTEIN URINE: CPT

## 2024-06-02 PROCEDURE — 93971 EXTREMITY STUDY: CPT | Mod: 26,RT,59

## 2024-06-02 PROCEDURE — 84484 ASSAY OF TROPONIN QUANT: CPT

## 2024-06-02 PROCEDURE — 84155 ASSAY OF PROTEIN SERUM: CPT

## 2024-06-02 PROCEDURE — 85025 COMPLETE CBC W/AUTO DIFF WBC: CPT

## 2024-06-02 PROCEDURE — 84132 ASSAY OF SERUM POTASSIUM: CPT

## 2024-06-02 PROCEDURE — 99285 EMERGENCY DEPT VISIT HI MDM: CPT | Mod: FS

## 2024-06-02 PROCEDURE — 71045 X-RAY EXAM CHEST 1 VIEW: CPT

## 2024-06-02 PROCEDURE — 36415 COLL VENOUS BLD VENIPUNCTURE: CPT

## 2024-06-02 PROCEDURE — 83970 ASSAY OF PARATHORMONE: CPT

## 2024-06-02 PROCEDURE — 97116 GAIT TRAINING THERAPY: CPT | Mod: GP

## 2024-06-02 PROCEDURE — 82652 VIT D 1 25-DIHYDROXY: CPT

## 2024-06-02 PROCEDURE — 86901 BLOOD TYPING SEROLOGIC RH(D): CPT

## 2024-06-02 PROCEDURE — 83930 ASSAY OF BLOOD OSMOLALITY: CPT

## 2024-06-02 PROCEDURE — 86225 DNA ANTIBODY NATIVE: CPT

## 2024-06-02 PROCEDURE — 94760 N-INVAS EAR/PLS OXIMETRY 1: CPT

## 2024-06-02 PROCEDURE — 85045 AUTOMATED RETICULOCYTE COUNT: CPT

## 2024-06-02 PROCEDURE — 84100 ASSAY OF PHOSPHORUS: CPT

## 2024-06-02 PROCEDURE — 97530 THERAPEUTIC ACTIVITIES: CPT | Mod: GP

## 2024-06-02 PROCEDURE — 83935 ASSAY OF URINE OSMOLALITY: CPT

## 2024-06-02 PROCEDURE — 84550 ASSAY OF BLOOD/URIC ACID: CPT

## 2024-06-02 PROCEDURE — 82306 VITAMIN D 25 HYDROXY: CPT

## 2024-06-02 PROCEDURE — 93010 ELECTROCARDIOGRAM REPORT: CPT

## 2024-06-02 PROCEDURE — 85018 HEMOGLOBIN: CPT

## 2024-06-02 PROCEDURE — 82747 ASSAY OF FOLIC ACID RBC: CPT

## 2024-06-02 PROCEDURE — 80053 COMPREHEN METABOLIC PANEL: CPT

## 2024-06-02 PROCEDURE — 86900 BLOOD TYPING SEROLOGIC ABO: CPT

## 2024-06-02 PROCEDURE — 85027 COMPLETE CBC AUTOMATED: CPT

## 2024-06-02 PROCEDURE — 74018 RADEX ABDOMEN 1 VIEW: CPT

## 2024-06-02 PROCEDURE — 71045 X-RAY EXAM CHEST 1 VIEW: CPT | Mod: 26

## 2024-06-02 PROCEDURE — 86334 IMMUNOFIX E-PHORESIS SERUM: CPT

## 2024-06-02 PROCEDURE — 84133 ASSAY OF URINE POTASSIUM: CPT

## 2024-06-02 PROCEDURE — 83540 ASSAY OF IRON: CPT

## 2024-06-02 PROCEDURE — 82310 ASSAY OF CALCIUM: CPT

## 2024-06-02 PROCEDURE — 93970 EXTREMITY STUDY: CPT | Mod: 26

## 2024-06-02 PROCEDURE — 84295 ASSAY OF SERUM SODIUM: CPT

## 2024-06-02 PROCEDURE — 83735 ASSAY OF MAGNESIUM: CPT

## 2024-06-02 PROCEDURE — 93971 EXTREMITY STUDY: CPT | Mod: RT

## 2024-06-02 PROCEDURE — 86160 COMPLEMENT ANTIGEN: CPT

## 2024-06-02 PROCEDURE — 85610 PROTHROMBIN TIME: CPT

## 2024-06-02 PROCEDURE — 86850 RBC ANTIBODY SCREEN: CPT

## 2024-06-02 RX ORDER — FUROSEMIDE 10 MG/ML
40 INJECTION, SOLUTION INTRAMUSCULAR; INTRAVENOUS ONCE
Refills: 0 | Status: COMPLETED | OUTPATIENT
Start: 2024-06-02 | End: 2024-06-02

## 2024-06-02 RX ADMIN — FUROSEMIDE 40 MILLIGRAM(S): 10 INJECTION, SOLUTION INTRAMUSCULAR; INTRAVENOUS at 20:59

## 2024-06-03 ENCOUNTER — TRANSCRIPTION ENCOUNTER (OUTPATIENT)
Age: 69
End: 2024-06-03

## 2024-06-03 LAB
ALBUMIN SERPL ELPH-MCNC: 2.9 G/DL — LOW (ref 3.5–5.2)
ALP SERPL-CCNC: 50 U/L — SIGNIFICANT CHANGE UP (ref 30–115)
ALT FLD-CCNC: 9 U/L — SIGNIFICANT CHANGE UP (ref 0–41)
ANION GAP SERPL CALC-SCNC: 10 MMOL/L — SIGNIFICANT CHANGE UP (ref 7–14)
ANION GAP SERPL CALC-SCNC: 11 MMOL/L — SIGNIFICANT CHANGE UP (ref 7–14)
ANION GAP SERPL CALC-SCNC: 9 MMOL/L — SIGNIFICANT CHANGE UP (ref 7–14)
APPEARANCE UR: CLEAR — SIGNIFICANT CHANGE UP
AST SERPL-CCNC: 18 U/L — SIGNIFICANT CHANGE UP (ref 0–41)
BACTERIA # UR AUTO: NEGATIVE /HPF — SIGNIFICANT CHANGE UP
BASOPHILS # BLD AUTO: 0.02 K/UL — SIGNIFICANT CHANGE UP (ref 0–0.2)
BASOPHILS NFR BLD AUTO: 0.3 % — SIGNIFICANT CHANGE UP (ref 0–1)
BILIRUB SERPL-MCNC: 0.3 MG/DL — SIGNIFICANT CHANGE UP (ref 0.2–1.2)
BILIRUB UR-MCNC: NEGATIVE — SIGNIFICANT CHANGE UP
BUN SERPL-MCNC: 38 MG/DL — HIGH (ref 10–20)
BUN SERPL-MCNC: 38 MG/DL — HIGH (ref 10–20)
BUN SERPL-MCNC: 39 MG/DL — HIGH (ref 10–20)
CALCIUM SERPL-MCNC: 7.1 MG/DL — LOW (ref 8.4–10.5)
CALCIUM SERPL-MCNC: 7.4 MG/DL — LOW (ref 8.4–10.4)
CALCIUM SERPL-MCNC: 7.4 MG/DL — LOW (ref 8.4–10.5)
CAST: 0 /LPF — SIGNIFICANT CHANGE UP (ref 0–4)
CHLORIDE SERPL-SCNC: 93 MMOL/L — LOW (ref 98–110)
CHLORIDE SERPL-SCNC: 93 MMOL/L — LOW (ref 98–110)
CHLORIDE SERPL-SCNC: 94 MMOL/L — LOW (ref 98–110)
CO2 SERPL-SCNC: 18 MMOL/L — SIGNIFICANT CHANGE UP (ref 17–32)
CO2 SERPL-SCNC: 19 MMOL/L — SIGNIFICANT CHANGE UP (ref 17–32)
CO2 SERPL-SCNC: 21 MMOL/L — SIGNIFICANT CHANGE UP (ref 17–32)
COLOR SPEC: YELLOW — SIGNIFICANT CHANGE UP
CREAT ?TM UR-MCNC: 17 MG/DL — SIGNIFICANT CHANGE UP
CREAT SERPL-MCNC: 1.7 MG/DL — HIGH (ref 0.7–1.5)
CREAT SERPL-MCNC: 1.8 MG/DL — HIGH (ref 0.7–1.5)
CREAT SERPL-MCNC: 2.1 MG/DL — HIGH (ref 0.7–1.5)
CRP SERPL-MCNC: 49.6 MG/L — HIGH
DIFF PNL FLD: NEGATIVE — SIGNIFICANT CHANGE UP
EGFR: 25 ML/MIN/1.73M2 — LOW
EGFR: 30 ML/MIN/1.73M2 — LOW
EGFR: 32 ML/MIN/1.73M2 — LOW
EOSINOPHIL # BLD AUTO: 0.13 K/UL — SIGNIFICANT CHANGE UP (ref 0–0.7)
EOSINOPHIL NFR BLD AUTO: 2 % — SIGNIFICANT CHANGE UP (ref 0–8)
GLUCOSE SERPL-MCNC: 107 MG/DL — HIGH (ref 70–99)
GLUCOSE SERPL-MCNC: 107 MG/DL — HIGH (ref 70–99)
GLUCOSE SERPL-MCNC: 79 MG/DL — SIGNIFICANT CHANGE UP (ref 70–99)
GLUCOSE UR QL: NEGATIVE MG/DL — SIGNIFICANT CHANGE UP
HCT VFR BLD CALC: 23.4 % — LOW (ref 37–47)
HGB BLD-MCNC: 8 G/DL — LOW (ref 12–16)
IMM GRANULOCYTES NFR BLD AUTO: 0.5 % — HIGH (ref 0.1–0.3)
IRON SATN MFR SERPL: 15 % — SIGNIFICANT CHANGE UP (ref 15–50)
IRON SATN MFR SERPL: 28 UG/DL — LOW (ref 35–150)
KETONES UR-MCNC: NEGATIVE MG/DL — SIGNIFICANT CHANGE UP
LEUKOCYTE ESTERASE UR-ACNC: NEGATIVE — SIGNIFICANT CHANGE UP
LYMPHOCYTES # BLD AUTO: 0.51 K/UL — LOW (ref 1.2–3.4)
LYMPHOCYTES # BLD AUTO: 7.9 % — LOW (ref 20.5–51.1)
MAGNESIUM SERPL-MCNC: 1.7 MG/DL — LOW (ref 1.8–2.4)
MCHC RBC-ENTMCNC: 32 PG — HIGH (ref 27–31)
MCHC RBC-ENTMCNC: 34.2 G/DL — SIGNIFICANT CHANGE UP (ref 32–37)
MCV RBC AUTO: 93.6 FL — SIGNIFICANT CHANGE UP (ref 81–99)
MONOCYTES # BLD AUTO: 0.52 K/UL — SIGNIFICANT CHANGE UP (ref 0.1–0.6)
MONOCYTES NFR BLD AUTO: 8.1 % — SIGNIFICANT CHANGE UP (ref 1.7–9.3)
NEUTROPHILS # BLD AUTO: 5.23 K/UL — SIGNIFICANT CHANGE UP (ref 1.4–6.5)
NEUTROPHILS NFR BLD AUTO: 81.2 % — HIGH (ref 42.2–75.2)
NITRITE UR-MCNC: NEGATIVE — SIGNIFICANT CHANGE UP
NRBC # BLD: 0 /100 WBCS — SIGNIFICANT CHANGE UP (ref 0–0)
OSMOLALITY UR: 237 MOS/KG — SIGNIFICANT CHANGE UP (ref 50–1200)
PH UR: 6.5 — SIGNIFICANT CHANGE UP (ref 5–8)
PHOSPHATE SERPL-MCNC: 3.7 MG/DL — SIGNIFICANT CHANGE UP (ref 2.1–4.9)
PLATELET # BLD AUTO: 130 K/UL — SIGNIFICANT CHANGE UP (ref 130–400)
PMV BLD: 9.5 FL — SIGNIFICANT CHANGE UP (ref 7.4–10.4)
POTASSIUM SERPL-MCNC: 4.9 MMOL/L — SIGNIFICANT CHANGE UP (ref 3.5–5)
POTASSIUM SERPL-MCNC: 4.9 MMOL/L — SIGNIFICANT CHANGE UP (ref 3.5–5)
POTASSIUM SERPL-MCNC: 5.3 MMOL/L — HIGH (ref 3.5–5)
POTASSIUM SERPL-SCNC: 4.9 MMOL/L — SIGNIFICANT CHANGE UP (ref 3.5–5)
POTASSIUM SERPL-SCNC: 4.9 MMOL/L — SIGNIFICANT CHANGE UP (ref 3.5–5)
POTASSIUM SERPL-SCNC: 5.3 MMOL/L — HIGH (ref 3.5–5)
POTASSIUM UR-SCNC: 17 MMOL/L — SIGNIFICANT CHANGE UP
PROT ?TM UR-MCNC: 69 MG/DLG/24H — SIGNIFICANT CHANGE UP
PROT SERPL-MCNC: 4.6 G/DL — LOW (ref 6–8)
PROT UR-MCNC: 100 MG/DL
PROT/CREAT UR-RTO: 4.1 RATIO — HIGH (ref 0–0.2)
RBC # BLD: 2.5 M/UL — LOW (ref 4.2–5.4)
RBC # BLD: 2.5 M/UL — LOW (ref 4.2–5.4)
RBC # FLD: 14.3 % — SIGNIFICANT CHANGE UP (ref 11.5–14.5)
RBC CASTS # UR COMP ASSIST: 0 /HPF — SIGNIFICANT CHANGE UP (ref 0–4)
RETICS #: 39.3 K/UL — SIGNIFICANT CHANGE UP (ref 25–125)
RETICS/RBC NFR: 1.6 % — HIGH (ref 0.5–1.5)
SODIUM SERPL-SCNC: 121 MMOL/L — LOW (ref 135–146)
SODIUM SERPL-SCNC: 122 MMOL/L — LOW (ref 135–146)
SODIUM SERPL-SCNC: 125 MMOL/L — LOW (ref 135–146)
SODIUM UR-SCNC: 71 MMOL/L — SIGNIFICANT CHANGE UP
SP GR SPEC: 1.01 — SIGNIFICANT CHANGE UP (ref 1–1.03)
SQUAMOUS # UR AUTO: 0 /HPF — SIGNIFICANT CHANGE UP (ref 0–5)
T4 FREE+ TSH PNL SERPL: 0.49 UIU/ML — SIGNIFICANT CHANGE UP (ref 0.27–4.2)
TIBC SERPL-MCNC: 187 UG/DL — LOW (ref 220–430)
TROPONIN T, HIGH SENSITIVITY RESULT: 63 NG/L — CRITICAL HIGH (ref 6–13)
TROPONIN T, HIGH SENSITIVITY RESULT: 64 NG/L — CRITICAL HIGH (ref 6–13)
TROPONIN T, HIGH SENSITIVITY RESULT: 69 NG/L — CRITICAL HIGH (ref 6–13)
UIBC SERPL-MCNC: 159 UG/DL — SIGNIFICANT CHANGE UP (ref 110–370)
UROBILINOGEN FLD QL: 0.2 MG/DL — SIGNIFICANT CHANGE UP (ref 0.2–1)
UUN UR-MCNC: 156 MG/DL — SIGNIFICANT CHANGE UP
WBC # BLD: 6.44 K/UL — SIGNIFICANT CHANGE UP (ref 4.8–10.8)
WBC # FLD AUTO: 6.44 K/UL — SIGNIFICANT CHANGE UP (ref 4.8–10.8)
WBC UR QL: 0 /HPF — SIGNIFICANT CHANGE UP (ref 0–5)

## 2024-06-03 PROCEDURE — 99223 1ST HOSP IP/OBS HIGH 75: CPT

## 2024-06-03 PROCEDURE — 99497 ADVNCD CARE PLAN 30 MIN: CPT | Mod: 25

## 2024-06-03 PROCEDURE — 99222 1ST HOSP IP/OBS MODERATE 55: CPT

## 2024-06-03 RX ORDER — ISOSORBIDE DINITRATE 5 MG/1
10 TABLET ORAL THREE TIMES A DAY
Refills: 0 | Status: DISCONTINUED | OUTPATIENT
Start: 2024-06-03 | End: 2024-06-05

## 2024-06-03 RX ORDER — ASPIRIN 325 MG/1
81 TABLET, FILM COATED ORAL DAILY
Refills: 0 | Status: DISCONTINUED | OUTPATIENT
Start: 2024-06-03 | End: 2024-06-05

## 2024-06-03 RX ORDER — SODIUM ZIRCONIUM CYCLOSILICATE 10 G/10G
10 POWDER, FOR SUSPENSION ORAL ONCE
Refills: 0 | Status: COMPLETED | OUTPATIENT
Start: 2024-06-03 | End: 2024-06-03

## 2024-06-03 RX ORDER — MAGNESIUM, ALUMINUM HYDROXIDE 400-400
30 TABLET,CHEWABLE ORAL EVERY 4 HOURS
Refills: 0 | Status: DISCONTINUED | OUTPATIENT
Start: 2024-06-03 | End: 2024-06-17

## 2024-06-03 RX ORDER — BUMETANIDE 0.25 MG/ML
2 INJECTION INTRAMUSCULAR; INTRAVENOUS
Refills: 0 | Status: DISCONTINUED | OUTPATIENT
Start: 2024-06-03 | End: 2024-06-05

## 2024-06-03 RX ORDER — LABETALOL HYDROCHLORIDE 300 MG/1
200 TABLET ORAL EVERY 8 HOURS
Refills: 0 | Status: DISCONTINUED | OUTPATIENT
Start: 2024-06-03 | End: 2024-06-09

## 2024-06-03 RX ORDER — HEPARIN SODIUM 50 [USP'U]/ML
5000 INJECTION, SOLUTION INTRAVENOUS EVERY 8 HOURS
Refills: 0 | Status: DISCONTINUED | OUTPATIENT
Start: 2024-06-03 | End: 2024-06-17

## 2024-06-03 RX ORDER — MAGNESIUM SULFATE 100 %
2 POWDER (GRAM) MISCELLANEOUS ONCE
Refills: 0 | Status: COMPLETED | OUTPATIENT
Start: 2024-06-03 | End: 2024-06-03

## 2024-06-03 RX ORDER — CALCIUM CARBONATE 500(1250)
1 TABLET,CHEWABLE ORAL
Refills: 0 | Status: DISCONTINUED | OUTPATIENT
Start: 2024-06-03 | End: 2024-06-12

## 2024-06-03 RX ORDER — PANTOPRAZOLE SODIUM 40 MG/10ML
40 INJECTION, POWDER, FOR SOLUTION INTRAVENOUS
Refills: 0 | Status: DISCONTINUED | OUTPATIENT
Start: 2024-06-03 | End: 2024-06-17

## 2024-06-03 RX ORDER — ONDANSETRON HYDROCHLORIDE 2 MG/ML
4 INJECTION INTRAMUSCULAR; INTRAVENOUS EVERY 8 HOURS
Refills: 0 | Status: DISCONTINUED | OUTPATIENT
Start: 2024-06-03 | End: 2024-06-17

## 2024-06-03 RX ORDER — ACETAMINOPHEN 325 MG
650 TABLET ORAL EVERY 6 HOURS
Refills: 0 | Status: DISCONTINUED | OUTPATIENT
Start: 2024-06-03 | End: 2024-06-17

## 2024-06-03 RX ORDER — HYDRALAZINE HYDROCHLORIDE 50 MG/1
25 TABLET ORAL EVERY 8 HOURS
Refills: 0 | Status: DISCONTINUED | OUTPATIENT
Start: 2024-06-03 | End: 2024-06-05

## 2024-06-03 RX ORDER — PREDNISONE 10 MG/1
10 TABLET ORAL DAILY
Refills: 0 | Status: DISCONTINUED | OUTPATIENT
Start: 2024-06-03 | End: 2024-06-17

## 2024-06-03 RX ORDER — LOSARTAN POTASSIUM 100 MG/1
25 TABLET, FILM COATED ORAL DAILY
Refills: 0 | Status: DISCONTINUED | OUTPATIENT
Start: 2024-06-03 | End: 2024-06-04

## 2024-06-03 RX ORDER — FUROSEMIDE 10 MG/ML
40 INJECTION, SOLUTION INTRAMUSCULAR; INTRAVENOUS EVERY 12 HOURS
Refills: 0 | Status: DISCONTINUED | OUTPATIENT
Start: 2024-06-03 | End: 2024-06-03

## 2024-06-03 RX ADMIN — ISOSORBIDE DINITRATE 10 MILLIGRAM(S): 5 TABLET ORAL at 11:35

## 2024-06-03 RX ADMIN — ISOSORBIDE DINITRATE 10 MILLIGRAM(S): 5 TABLET ORAL at 05:44

## 2024-06-03 RX ADMIN — PREDNISONE 10 MILLIGRAM(S): 10 TABLET ORAL at 05:44

## 2024-06-03 RX ADMIN — LABETALOL HYDROCHLORIDE 200 MILLIGRAM(S): 300 TABLET ORAL at 20:42

## 2024-06-03 RX ADMIN — HYDRALAZINE HYDROCHLORIDE 25 MILLIGRAM(S): 50 TABLET ORAL at 05:43

## 2024-06-03 RX ADMIN — HYDRALAZINE HYDROCHLORIDE 25 MILLIGRAM(S): 50 TABLET ORAL at 20:42

## 2024-06-03 RX ADMIN — ASPIRIN 81 MILLIGRAM(S): 325 TABLET, FILM COATED ORAL at 11:36

## 2024-06-03 RX ADMIN — Medication 1 TABLET(S): at 17:36

## 2024-06-03 RX ADMIN — FUROSEMIDE 40 MILLIGRAM(S): 10 INJECTION, SOLUTION INTRAMUSCULAR; INTRAVENOUS at 05:45

## 2024-06-03 RX ADMIN — BUMETANIDE 2 MILLIGRAM(S): 0.25 INJECTION INTRAMUSCULAR; INTRAVENOUS at 17:36

## 2024-06-03 RX ADMIN — Medication 1000 UNIT(S): at 11:35

## 2024-06-03 RX ADMIN — Medication 1 TABLET(S): at 05:43

## 2024-06-03 RX ADMIN — HEPARIN SODIUM 5000 UNIT(S): 50 INJECTION, SOLUTION INTRAVENOUS at 05:44

## 2024-06-03 RX ADMIN — HEPARIN SODIUM 5000 UNIT(S): 50 INJECTION, SOLUTION INTRAVENOUS at 20:42

## 2024-06-03 RX ADMIN — PANTOPRAZOLE SODIUM 40 MILLIGRAM(S): 40 INJECTION, POWDER, FOR SOLUTION INTRAVENOUS at 05:49

## 2024-06-03 RX ADMIN — HEPARIN SODIUM 5000 UNIT(S): 50 INJECTION, SOLUTION INTRAVENOUS at 13:39

## 2024-06-03 RX ADMIN — HYDRALAZINE HYDROCHLORIDE 25 MILLIGRAM(S): 50 TABLET ORAL at 13:40

## 2024-06-03 RX ADMIN — LABETALOL HYDROCHLORIDE 200 MILLIGRAM(S): 300 TABLET ORAL at 13:40

## 2024-06-03 RX ADMIN — LABETALOL HYDROCHLORIDE 200 MILLIGRAM(S): 300 TABLET ORAL at 05:43

## 2024-06-03 RX ADMIN — LOSARTAN POTASSIUM 25 MILLIGRAM(S): 100 TABLET, FILM COATED ORAL at 05:44

## 2024-06-03 RX ADMIN — SODIUM ZIRCONIUM CYCLOSILICATE 10 GRAM(S): 10 POWDER, FOR SUSPENSION ORAL at 19:49

## 2024-06-03 RX ADMIN — Medication 650 MILLIGRAM(S): at 23:23

## 2024-06-03 RX ADMIN — Medication 25 GRAM(S): at 13:39

## 2024-06-03 RX ADMIN — ISOSORBIDE DINITRATE 10 MILLIGRAM(S): 5 TABLET ORAL at 17:36

## 2024-06-04 LAB
ANA PAT FLD IF-IMP: ABNORMAL
ANA TITR SER: ABNORMAL
ANION GAP SERPL CALC-SCNC: 11 MMOL/L — SIGNIFICANT CHANGE UP (ref 7–14)
BUN SERPL-MCNC: 37 MG/DL — HIGH (ref 10–20)
C3 SERPL-MCNC: 70 MG/DL — LOW (ref 81–157)
C4 SERPL-MCNC: 20 MG/DL — SIGNIFICANT CHANGE UP (ref 13–39)
CALCIUM SERPL-MCNC: 7.3 MG/DL — LOW (ref 8.4–10.5)
CALCIUM SERPL-MCNC: 7.3 MG/DL — LOW (ref 8.4–10.5)
CALCIUM SERPL-MCNC: 7.5 MG/DL — LOW (ref 8.4–10.5)
CHLORIDE SERPL-SCNC: 94 MMOL/L — LOW (ref 98–110)
CO2 SERPL-SCNC: 21 MMOL/L — SIGNIFICANT CHANGE UP (ref 17–32)
CREAT SERPL-MCNC: 1.9 MG/DL — HIGH (ref 0.7–1.5)
DSDNA AB SER-ACNC: <1 IU/ML — SIGNIFICANT CHANGE UP
EGFR: 28 ML/MIN/1.73M2 — LOW
FERRITIN SERPL-MCNC: 229 NG/ML — SIGNIFICANT CHANGE UP (ref 13–330)
FERRITIN SERPL-MCNC: 229 NG/ML — SIGNIFICANT CHANGE UP (ref 13–330)
FOLATE RBC-MCNC: 2242 NG/ML — HIGH (ref 499–1504)
FOLATE SERPL-MCNC: >20 NG/ML — SIGNIFICANT CHANGE UP
GLUCOSE SERPL-MCNC: 85 MG/DL — SIGNIFICANT CHANGE UP (ref 70–99)
HCT VFR BLD CALC: 24.8 % — LOW (ref 34.5–45)
MAGNESIUM SERPL-MCNC: 1.8 MG/DL — SIGNIFICANT CHANGE UP (ref 1.8–2.4)
NT-PROBNP SERPL-SCNC: 6680 PG/ML — HIGH (ref 0–300)
PHOSPHATE SERPL-MCNC: 3.7 MG/DL — SIGNIFICANT CHANGE UP (ref 2.1–4.9)
POTASSIUM SERPL-MCNC: 4.6 MMOL/L — SIGNIFICANT CHANGE UP (ref 3.5–5)
POTASSIUM SERPL-SCNC: 4.6 MMOL/L — SIGNIFICANT CHANGE UP (ref 3.5–5)
PTH-INTACT FLD-MCNC: 195 PG/ML — HIGH (ref 15–65)
PTH-INTACT FLD-MCNC: 291 PG/ML — HIGH (ref 15–65)
SODIUM SERPL-SCNC: 126 MMOL/L — LOW (ref 135–146)
VIT B12 SERPL-MCNC: 802 PG/ML — SIGNIFICANT CHANGE UP (ref 232–1245)
VIT B12 SERPL-MCNC: 943 PG/ML — SIGNIFICANT CHANGE UP (ref 232–1245)

## 2024-06-04 PROCEDURE — 99232 SBSQ HOSP IP/OBS MODERATE 35: CPT

## 2024-06-04 RX ORDER — LOSARTAN POTASSIUM 100 MG/1
50 TABLET, FILM COATED ORAL AT BEDTIME
Refills: 0 | Status: DISCONTINUED | OUTPATIENT
Start: 2024-06-04 | End: 2024-06-04

## 2024-06-04 RX ORDER — SODIUM CHLORIDE 0.9 % (FLUSH) 0.9 %
1 SYRINGE (ML) INJECTION THREE TIMES A DAY
Refills: 0 | Status: DISCONTINUED | OUTPATIENT
Start: 2024-06-04 | End: 2024-06-06

## 2024-06-04 RX ORDER — LOSARTAN POTASSIUM 100 MG/1
50 TABLET, FILM COATED ORAL AT BEDTIME
Refills: 0 | Status: DISCONTINUED | OUTPATIENT
Start: 2024-06-05 | End: 2024-06-11

## 2024-06-04 RX ORDER — LOSARTAN POTASSIUM 100 MG/1
25 TABLET, FILM COATED ORAL AT BEDTIME
Refills: 0 | Status: COMPLETED | OUTPATIENT
Start: 2024-06-04 | End: 2024-06-04

## 2024-06-04 RX ADMIN — HEPARIN SODIUM 5000 UNIT(S): 50 INJECTION, SOLUTION INTRAVENOUS at 05:14

## 2024-06-04 RX ADMIN — ISOSORBIDE DINITRATE 10 MILLIGRAM(S): 5 TABLET ORAL at 12:51

## 2024-06-04 RX ADMIN — ISOSORBIDE DINITRATE 10 MILLIGRAM(S): 5 TABLET ORAL at 15:46

## 2024-06-04 RX ADMIN — LABETALOL HYDROCHLORIDE 200 MILLIGRAM(S): 300 TABLET ORAL at 05:15

## 2024-06-04 RX ADMIN — HYDRALAZINE HYDROCHLORIDE 25 MILLIGRAM(S): 50 TABLET ORAL at 05:14

## 2024-06-04 RX ADMIN — Medication 1 GRAM(S): at 22:24

## 2024-06-04 RX ADMIN — Medication 3 MILLIGRAM(S): at 01:55

## 2024-06-04 RX ADMIN — ASPIRIN 81 MILLIGRAM(S): 325 TABLET, FILM COATED ORAL at 12:51

## 2024-06-04 RX ADMIN — ISOSORBIDE DINITRATE 10 MILLIGRAM(S): 5 TABLET ORAL at 06:20

## 2024-06-04 RX ADMIN — LOSARTAN POTASSIUM 25 MILLIGRAM(S): 100 TABLET, FILM COATED ORAL at 22:23

## 2024-06-04 RX ADMIN — HYDRALAZINE HYDROCHLORIDE 25 MILLIGRAM(S): 50 TABLET ORAL at 22:24

## 2024-06-04 RX ADMIN — BUMETANIDE 2 MILLIGRAM(S): 0.25 INJECTION INTRAMUSCULAR; INTRAVENOUS at 05:14

## 2024-06-04 RX ADMIN — PANTOPRAZOLE SODIUM 40 MILLIGRAM(S): 40 INJECTION, POWDER, FOR SOLUTION INTRAVENOUS at 05:15

## 2024-06-04 RX ADMIN — LOSARTAN POTASSIUM 25 MILLIGRAM(S): 100 TABLET, FILM COATED ORAL at 05:14

## 2024-06-04 RX ADMIN — HYDRALAZINE HYDROCHLORIDE 25 MILLIGRAM(S): 50 TABLET ORAL at 14:17

## 2024-06-04 RX ADMIN — HEPARIN SODIUM 5000 UNIT(S): 50 INJECTION, SOLUTION INTRAVENOUS at 22:24

## 2024-06-04 RX ADMIN — HEPARIN SODIUM 5000 UNIT(S): 50 INJECTION, SOLUTION INTRAVENOUS at 14:21

## 2024-06-04 RX ADMIN — Medication 1 TABLET(S): at 05:14

## 2024-06-04 RX ADMIN — PREDNISONE 10 MILLIGRAM(S): 10 TABLET ORAL at 06:20

## 2024-06-04 RX ADMIN — BUMETANIDE 2 MILLIGRAM(S): 0.25 INJECTION INTRAMUSCULAR; INTRAVENOUS at 14:16

## 2024-06-04 RX ADMIN — Medication 1 TABLET(S): at 17:43

## 2024-06-04 RX ADMIN — Medication 1000 UNIT(S): at 12:51

## 2024-06-04 RX ADMIN — Medication 650 MILLIGRAM(S): at 00:00

## 2024-06-04 RX ADMIN — LABETALOL HYDROCHLORIDE 200 MILLIGRAM(S): 300 TABLET ORAL at 14:17

## 2024-06-04 RX ADMIN — LABETALOL HYDROCHLORIDE 200 MILLIGRAM(S): 300 TABLET ORAL at 22:23

## 2024-06-04 RX ADMIN — Medication 650 MILLIGRAM(S): at 16:15

## 2024-06-04 RX ADMIN — Medication 650 MILLIGRAM(S): at 15:45

## 2024-06-05 LAB
24R-OH-CALCIDIOL SERPL-MCNC: 24 NG/ML — LOW (ref 30–80)
ALBUMIN SERPL ELPH-MCNC: 2.7 G/DL — LOW (ref 3.5–5.2)
ALP SERPL-CCNC: 53 U/L — SIGNIFICANT CHANGE UP (ref 30–115)
ALT FLD-CCNC: 8 U/L — SIGNIFICANT CHANGE UP (ref 0–41)
ANION GAP SERPL CALC-SCNC: 11 MMOL/L — SIGNIFICANT CHANGE UP (ref 7–14)
ANION GAP SERPL CALC-SCNC: 8 MMOL/L — SIGNIFICANT CHANGE UP (ref 7–14)
ANION GAP SERPL CALC-SCNC: 9 MMOL/L — SIGNIFICANT CHANGE UP (ref 7–14)
APTT BLD: 25.2 SEC — LOW (ref 27–39.2)
AST SERPL-CCNC: 16 U/L — SIGNIFICANT CHANGE UP (ref 0–41)
BASOPHILS # BLD AUTO: 0.01 K/UL — SIGNIFICANT CHANGE UP (ref 0–0.2)
BASOPHILS NFR BLD AUTO: 0.2 % — SIGNIFICANT CHANGE UP (ref 0–1)
BILIRUB SERPL-MCNC: 0.2 MG/DL — SIGNIFICANT CHANGE UP (ref 0.2–1.2)
BLD GP AB SCN SERPL QL: SIGNIFICANT CHANGE UP
BUN SERPL-MCNC: 36 MG/DL — HIGH (ref 10–20)
BUN SERPL-MCNC: 36 MG/DL — HIGH (ref 10–20)
BUN SERPL-MCNC: 37 MG/DL — HIGH (ref 10–20)
CALCIUM SERPL-MCNC: 7.3 MG/DL — LOW (ref 8.4–10.4)
CALCIUM SERPL-MCNC: 7.4 MG/DL — LOW (ref 8.4–10.5)
CALCIUM SERPL-MCNC: 7.5 MG/DL — LOW (ref 8.4–10.5)
CALCIUM SERPL-MCNC: 7.6 MG/DL — LOW (ref 8.4–10.5)
CHLORIDE SERPL-SCNC: 90 MMOL/L — LOW (ref 98–110)
CHLORIDE SERPL-SCNC: 90 MMOL/L — LOW (ref 98–110)
CHLORIDE SERPL-SCNC: 92 MMOL/L — LOW (ref 98–110)
CO2 SERPL-SCNC: 21 MMOL/L — SIGNIFICANT CHANGE UP (ref 17–32)
COLLECT DURATION TIME UR: 24 HR — SIGNIFICANT CHANGE UP
CREAT SERPL-MCNC: 2 MG/DL — HIGH (ref 0.7–1.5)
CREAT SERPL-MCNC: 2.1 MG/DL — HIGH (ref 0.7–1.5)
CREAT SERPL-MCNC: 2.1 MG/DL — HIGH (ref 0.7–1.5)
EGFR: 25 ML/MIN/1.73M2 — LOW
EGFR: 25 ML/MIN/1.73M2 — LOW
EGFR: 26 ML/MIN/1.73M2 — LOW
EOSINOPHIL # BLD AUTO: 0.1 K/UL — SIGNIFICANT CHANGE UP (ref 0–0.7)
EOSINOPHIL NFR BLD AUTO: 1.7 % — SIGNIFICANT CHANGE UP (ref 0–8)
GAS PNL BLDA: SIGNIFICANT CHANGE UP
GLUCOSE SERPL-MCNC: 133 MG/DL — HIGH (ref 70–99)
GLUCOSE SERPL-MCNC: 149 MG/DL — HIGH (ref 70–99)
GLUCOSE SERPL-MCNC: 84 MG/DL — SIGNIFICANT CHANGE UP (ref 70–99)
HCT VFR BLD CALC: 23.5 % — LOW (ref 37–47)
HGB BLD-MCNC: 8 G/DL — LOW (ref 12–16)
IMM GRANULOCYTES NFR BLD AUTO: 0.3 % — SIGNIFICANT CHANGE UP (ref 0.1–0.3)
INR BLD: 0.99 RATIO — SIGNIFICANT CHANGE UP (ref 0.65–1.3)
LYMPHOCYTES # BLD AUTO: 0.7 K/UL — LOW (ref 1.2–3.4)
LYMPHOCYTES # BLD AUTO: 12 % — LOW (ref 20.5–51.1)
MAGNESIUM SERPL-MCNC: 2.5 MG/DL — HIGH (ref 1.8–2.4)
MCHC RBC-ENTMCNC: 31.5 PG — HIGH (ref 27–31)
MCHC RBC-ENTMCNC: 34 G/DL — SIGNIFICANT CHANGE UP (ref 32–37)
MCV RBC AUTO: 92.5 FL — SIGNIFICANT CHANGE UP (ref 81–99)
MONOCYTES # BLD AUTO: 0.5 K/UL — SIGNIFICANT CHANGE UP (ref 0.1–0.6)
MONOCYTES NFR BLD AUTO: 8.6 % — SIGNIFICANT CHANGE UP (ref 1.7–9.3)
NEUTROPHILS # BLD AUTO: 4.48 K/UL — SIGNIFICANT CHANGE UP (ref 1.4–6.5)
NEUTROPHILS NFR BLD AUTO: 77.2 % — HIGH (ref 42.2–75.2)
NRBC # BLD: 0 /100 WBCS — SIGNIFICANT CHANGE UP (ref 0–0)
NT-PROBNP SERPL-SCNC: 6275 PG/ML — HIGH (ref 0–300)
PLATELET # BLD AUTO: 135 K/UL — SIGNIFICANT CHANGE UP (ref 130–400)
PMV BLD: 9.7 FL — SIGNIFICANT CHANGE UP (ref 7.4–10.4)
POTASSIUM SERPL-MCNC: 4.5 MMOL/L — SIGNIFICANT CHANGE UP (ref 3.5–5)
POTASSIUM SERPL-MCNC: 4.6 MMOL/L — SIGNIFICANT CHANGE UP (ref 3.5–5)
POTASSIUM SERPL-MCNC: 4.7 MMOL/L — SIGNIFICANT CHANGE UP (ref 3.5–5)
POTASSIUM SERPL-SCNC: 4.5 MMOL/L — SIGNIFICANT CHANGE UP (ref 3.5–5)
POTASSIUM SERPL-SCNC: 4.6 MMOL/L — SIGNIFICANT CHANGE UP (ref 3.5–5)
POTASSIUM SERPL-SCNC: 4.7 MMOL/L — SIGNIFICANT CHANGE UP (ref 3.5–5)
PROT 24H UR-MRATE: 1054 MG/24 H — HIGH (ref 50–100)
PROT SERPL-MCNC: 4.5 G/DL — LOW (ref 6–8)
PROTHROM AB SERPL-ACNC: 11.3 SEC — SIGNIFICANT CHANGE UP (ref 9.95–12.87)
PTH-INTACT FLD-MCNC: 216 PG/ML — HIGH (ref 15–65)
RBC # BLD: 2.54 M/UL — LOW (ref 4.2–5.4)
RBC # FLD: 13.8 % — SIGNIFICANT CHANGE UP (ref 11.5–14.5)
SODIUM SERPL-SCNC: 120 MMOL/L — LOW (ref 135–146)
SODIUM SERPL-SCNC: 121 MMOL/L — LOW (ref 135–146)
SODIUM SERPL-SCNC: 122 MMOL/L — LOW (ref 135–146)
TOTAL VOLUME - 24 HOUR: 1700 ML — SIGNIFICANT CHANGE UP
URINE CREATININE CALCULATION: 0.3 G/24 HR — LOW (ref 0.8–1.8)
VIT D25+D1,25 OH+D1,25 PNL SERPL-MCNC: 61.6 PG/ML — SIGNIFICANT CHANGE UP (ref 19.9–79.3)
WBC # BLD: 5.81 K/UL — SIGNIFICANT CHANGE UP (ref 4.8–10.8)
WBC # FLD AUTO: 5.81 K/UL — SIGNIFICANT CHANGE UP (ref 4.8–10.8)

## 2024-06-05 PROCEDURE — 99223 1ST HOSP IP/OBS HIGH 75: CPT

## 2024-06-05 PROCEDURE — 99233 SBSQ HOSP IP/OBS HIGH 50: CPT

## 2024-06-05 PROCEDURE — 99221 1ST HOSP IP/OBS SF/LOW 40: CPT

## 2024-06-05 PROCEDURE — 38220 DX BONE MARROW ASPIRATIONS: CPT | Mod: 50

## 2024-06-05 RX ORDER — SODIUM CHLORIDE 3 G/100ML
150 INJECTION, SOLUTION INTRAVENOUS
Refills: 0 | Status: DISCONTINUED | OUTPATIENT
Start: 2024-06-05 | End: 2024-06-06

## 2024-06-05 RX ORDER — GUAIFENESIN 100 %
200 POWDER (GRAM) MISCELLANEOUS EVERY 6 HOURS
Refills: 0 | Status: DISCONTINUED | OUTPATIENT
Start: 2024-06-05 | End: 2024-06-13

## 2024-06-05 RX ORDER — SIMETHICONE 40MG/0.6ML
80 SUSPENSION, DROPS(FINAL DOSAGE FORM)(ML) ORAL EVERY 6 HOURS
Refills: 0 | Status: DISCONTINUED | OUTPATIENT
Start: 2024-06-05 | End: 2024-06-17

## 2024-06-05 RX ORDER — ISOSORBIDE DINITRATE 5 MG/1
5 TABLET ORAL THREE TIMES A DAY
Refills: 0 | Status: DISCONTINUED | OUTPATIENT
Start: 2024-06-05 | End: 2024-06-17

## 2024-06-05 RX ORDER — HYDRALAZINE HYDROCHLORIDE 50 MG/1
50 TABLET ORAL EVERY 8 HOURS
Refills: 0 | Status: DISCONTINUED | OUTPATIENT
Start: 2024-06-05 | End: 2024-06-09

## 2024-06-05 RX ORDER — SODIUM CHLORIDE 3 G/100ML
500 INJECTION, SOLUTION INTRAVENOUS
Refills: 0 | Status: DISCONTINUED | OUTPATIENT
Start: 2024-06-05 | End: 2024-06-05

## 2024-06-05 RX ADMIN — ISOSORBIDE DINITRATE 10 MILLIGRAM(S): 5 TABLET ORAL at 06:47

## 2024-06-05 RX ADMIN — Medication 1 GRAM(S): at 13:39

## 2024-06-05 RX ADMIN — Medication 1 APPLICATION(S): at 13:00

## 2024-06-05 RX ADMIN — PANTOPRAZOLE SODIUM 40 MILLIGRAM(S): 40 INJECTION, POWDER, FOR SOLUTION INTRAVENOUS at 06:21

## 2024-06-05 RX ADMIN — LABETALOL HYDROCHLORIDE 200 MILLIGRAM(S): 300 TABLET ORAL at 13:00

## 2024-06-05 RX ADMIN — Medication 1 TABLET(S): at 17:54

## 2024-06-05 RX ADMIN — LABETALOL HYDROCHLORIDE 200 MILLIGRAM(S): 300 TABLET ORAL at 21:38

## 2024-06-05 RX ADMIN — Medication 650 MILLIGRAM(S): at 01:30

## 2024-06-05 RX ADMIN — BUMETANIDE 2 MILLIGRAM(S): 0.25 INJECTION INTRAMUSCULAR; INTRAVENOUS at 14:23

## 2024-06-05 RX ADMIN — HYDRALAZINE HYDROCHLORIDE 50 MILLIGRAM(S): 50 TABLET ORAL at 21:38

## 2024-06-05 RX ADMIN — Medication 80 MILLIGRAM(S): at 17:54

## 2024-06-05 RX ADMIN — HEPARIN SODIUM 5000 UNIT(S): 50 INJECTION, SOLUTION INTRAVENOUS at 13:28

## 2024-06-05 RX ADMIN — LOSARTAN POTASSIUM 50 MILLIGRAM(S): 100 TABLET, FILM COATED ORAL at 21:38

## 2024-06-05 RX ADMIN — Medication 3 MILLIGRAM(S): at 01:30

## 2024-06-05 RX ADMIN — ISOSORBIDE DINITRATE 5 MILLIGRAM(S): 5 TABLET ORAL at 12:01

## 2024-06-05 RX ADMIN — Medication 1000 UNIT(S): at 13:00

## 2024-06-05 RX ADMIN — SODIUM CHLORIDE 50 MILLILITER(S): 3 INJECTION, SOLUTION INTRAVENOUS at 17:54

## 2024-06-05 RX ADMIN — HYDRALAZINE HYDROCHLORIDE 25 MILLIGRAM(S): 50 TABLET ORAL at 06:20

## 2024-06-05 RX ADMIN — HEPARIN SODIUM 5000 UNIT(S): 50 INJECTION, SOLUTION INTRAVENOUS at 06:21

## 2024-06-05 RX ADMIN — Medication 80 MILLIGRAM(S): at 13:00

## 2024-06-05 RX ADMIN — HEPARIN SODIUM 5000 UNIT(S): 50 INJECTION, SOLUTION INTRAVENOUS at 21:39

## 2024-06-05 RX ADMIN — PREDNISONE 10 MILLIGRAM(S): 10 TABLET ORAL at 06:20

## 2024-06-05 RX ADMIN — ISOSORBIDE DINITRATE 5 MILLIGRAM(S): 5 TABLET ORAL at 16:34

## 2024-06-05 RX ADMIN — Medication 1 GRAM(S): at 06:14

## 2024-06-05 RX ADMIN — BUMETANIDE 2 MILLIGRAM(S): 0.25 INJECTION INTRAMUSCULAR; INTRAVENOUS at 06:21

## 2024-06-05 RX ADMIN — ASPIRIN 81 MILLIGRAM(S): 325 TABLET, FILM COATED ORAL at 13:00

## 2024-06-05 RX ADMIN — HYDRALAZINE HYDROCHLORIDE 50 MILLIGRAM(S): 50 TABLET ORAL at 13:28

## 2024-06-05 RX ADMIN — Medication 1 GRAM(S): at 21:38

## 2024-06-05 RX ADMIN — Medication 1 TABLET(S): at 06:20

## 2024-06-05 RX ADMIN — LABETALOL HYDROCHLORIDE 200 MILLIGRAM(S): 300 TABLET ORAL at 06:20

## 2024-06-06 LAB
ALBUMIN SERPL ELPH-MCNC: 2.6 G/DL — LOW (ref 3.5–5.2)
ALP SERPL-CCNC: 49 U/L — SIGNIFICANT CHANGE UP (ref 30–115)
ALT FLD-CCNC: 7 U/L — SIGNIFICANT CHANGE UP (ref 0–41)
ANION GAP SERPL CALC-SCNC: 11 MMOL/L — SIGNIFICANT CHANGE UP (ref 7–14)
ANION GAP SERPL CALC-SCNC: 12 MMOL/L — SIGNIFICANT CHANGE UP (ref 7–14)
ANION GAP SERPL CALC-SCNC: 14 MMOL/L — SIGNIFICANT CHANGE UP (ref 7–14)
AST SERPL-CCNC: 14 U/L — SIGNIFICANT CHANGE UP (ref 0–41)
BASOPHILS # BLD AUTO: 0.01 K/UL — SIGNIFICANT CHANGE UP (ref 0–0.2)
BASOPHILS NFR BLD AUTO: 0.2 % — SIGNIFICANT CHANGE UP (ref 0–1)
BILIRUB SERPL-MCNC: <0.2 MG/DL — SIGNIFICANT CHANGE UP (ref 0.2–1.2)
BLD GP AB SCN SERPL QL: SIGNIFICANT CHANGE UP
BUN SERPL-MCNC: 36 MG/DL — HIGH (ref 10–20)
BUN SERPL-MCNC: 36 MG/DL — HIGH (ref 10–20)
BUN SERPL-MCNC: 37 MG/DL — HIGH (ref 10–20)
CALCIUM SERPL-MCNC: 7.2 MG/DL — LOW (ref 8.4–10.5)
CALCIUM SERPL-MCNC: 7.3 MG/DL — LOW (ref 8.4–10.5)
CALCIUM SERPL-MCNC: 7.4 MG/DL — LOW (ref 8.4–10.5)
CHLORIDE SERPL-SCNC: 91 MMOL/L — LOW (ref 98–110)
CHLORIDE SERPL-SCNC: 91 MMOL/L — LOW (ref 98–110)
CHLORIDE SERPL-SCNC: 93 MMOL/L — LOW (ref 98–110)
CO2 SERPL-SCNC: 19 MMOL/L — SIGNIFICANT CHANGE UP (ref 17–32)
CO2 SERPL-SCNC: 21 MMOL/L — SIGNIFICANT CHANGE UP (ref 17–32)
CO2 SERPL-SCNC: 22 MMOL/L — SIGNIFICANT CHANGE UP (ref 17–32)
CREAT SERPL-MCNC: 2 MG/DL — HIGH (ref 0.7–1.5)
CREAT SERPL-MCNC: 2 MG/DL — HIGH (ref 0.7–1.5)
CREAT SERPL-MCNC: 2.1 MG/DL — HIGH (ref 0.7–1.5)
EGFR: 25 ML/MIN/1.73M2 — LOW
EGFR: 26 ML/MIN/1.73M2 — LOW
EGFR: 26 ML/MIN/1.73M2 — LOW
EOSINOPHIL # BLD AUTO: 0.08 K/UL — SIGNIFICANT CHANGE UP (ref 0–0.7)
EOSINOPHIL NFR BLD AUTO: 1.5 % — SIGNIFICANT CHANGE UP (ref 0–8)
GLUCOSE SERPL-MCNC: 107 MG/DL — HIGH (ref 70–99)
GLUCOSE SERPL-MCNC: 115 MG/DL — HIGH (ref 70–99)
GLUCOSE SERPL-MCNC: 126 MG/DL — HIGH (ref 70–99)
HCT VFR BLD CALC: 22.5 % — LOW (ref 37–47)
HGB BLD-MCNC: 7.8 G/DL — LOW (ref 12–16)
IMM GRANULOCYTES NFR BLD AUTO: 0.4 % — HIGH (ref 0.1–0.3)
LYMPHOCYTES # BLD AUTO: 0.63 K/UL — LOW (ref 1.2–3.4)
LYMPHOCYTES # BLD AUTO: 12.1 % — LOW (ref 20.5–51.1)
MAGNESIUM SERPL-MCNC: 1.6 MG/DL — LOW (ref 1.8–2.4)
MCHC RBC-ENTMCNC: 32.2 PG — HIGH (ref 27–31)
MCHC RBC-ENTMCNC: 34.7 G/DL — SIGNIFICANT CHANGE UP (ref 32–37)
MCV RBC AUTO: 93 FL — SIGNIFICANT CHANGE UP (ref 81–99)
MONOCYTES # BLD AUTO: 0.46 K/UL — SIGNIFICANT CHANGE UP (ref 0.1–0.6)
MONOCYTES NFR BLD AUTO: 8.8 % — SIGNIFICANT CHANGE UP (ref 1.7–9.3)
NEUTROPHILS # BLD AUTO: 4 K/UL — SIGNIFICANT CHANGE UP (ref 1.4–6.5)
NEUTROPHILS NFR BLD AUTO: 77 % — HIGH (ref 42.2–75.2)
NRBC # BLD: 0 /100 WBCS — SIGNIFICANT CHANGE UP (ref 0–0)
PLATELET # BLD AUTO: 137 K/UL — SIGNIFICANT CHANGE UP (ref 130–400)
PMV BLD: 9.5 FL — SIGNIFICANT CHANGE UP (ref 7.4–10.4)
POTASSIUM SERPL-MCNC: 4.6 MMOL/L — SIGNIFICANT CHANGE UP (ref 3.5–5)
POTASSIUM SERPL-MCNC: 4.7 MMOL/L — SIGNIFICANT CHANGE UP (ref 3.5–5)
POTASSIUM SERPL-MCNC: 5.4 MMOL/L — HIGH (ref 3.5–5)
POTASSIUM SERPL-SCNC: 4.6 MMOL/L — SIGNIFICANT CHANGE UP (ref 3.5–5)
POTASSIUM SERPL-SCNC: 4.7 MMOL/L — SIGNIFICANT CHANGE UP (ref 3.5–5)
POTASSIUM SERPL-SCNC: 5.4 MMOL/L — HIGH (ref 3.5–5)
PROT SERPL-MCNC: 4.3 G/DL — LOW (ref 6–8)
RBC # BLD: 2.42 M/UL — LOW (ref 4.2–5.4)
RBC # FLD: 14 % — SIGNIFICANT CHANGE UP (ref 11.5–14.5)
SODIUM SERPL-SCNC: 124 MMOL/L — LOW (ref 135–146)
SODIUM SERPL-SCNC: 124 MMOL/L — LOW (ref 135–146)
SODIUM SERPL-SCNC: 126 MMOL/L — LOW (ref 135–146)
URATE SERPL-MCNC: 7.1 MG/DL — HIGH (ref 2.5–7)
WBC # BLD: 5.2 K/UL — SIGNIFICANT CHANGE UP (ref 4.8–10.8)
WBC # FLD AUTO: 5.2 K/UL — SIGNIFICANT CHANGE UP (ref 4.8–10.8)

## 2024-06-06 PROCEDURE — 99233 SBSQ HOSP IP/OBS HIGH 50: CPT

## 2024-06-06 RX ORDER — CALCITRIOL 0.25 UG/1
0.25 CAPSULE, LIQUID FILLED ORAL DAILY
Refills: 0 | Status: DISCONTINUED | OUTPATIENT
Start: 2024-06-06 | End: 2024-06-17

## 2024-06-06 RX ORDER — BUMETANIDE 0.25 MG/ML
1 INJECTION INTRAMUSCULAR; INTRAVENOUS EVERY 12 HOURS
Refills: 0 | Status: DISCONTINUED | OUTPATIENT
Start: 2024-06-06 | End: 2024-06-06

## 2024-06-06 RX ORDER — MAGNESIUM SULFATE 100 %
2 POWDER (GRAM) MISCELLANEOUS
Refills: 0 | Status: COMPLETED | OUTPATIENT
Start: 2024-06-06 | End: 2024-06-06

## 2024-06-06 RX ORDER — SODIUM CHLORIDE 0.9 % (FLUSH) 0.9 %
1000 SYRINGE (ML) INJECTION
Refills: 0 | Status: DISCONTINUED | OUTPATIENT
Start: 2024-06-06 | End: 2024-06-07

## 2024-06-06 RX ORDER — IRON SUCROSE 20 MG/ML
200 INJECTION, SOLUTION INTRAVENOUS EVERY 24 HOURS
Refills: 0 | Status: COMPLETED | OUTPATIENT
Start: 2024-06-06 | End: 2024-06-10

## 2024-06-06 RX ORDER — SODIUM ZIRCONIUM CYCLOSILICATE 10 G/10G
10 POWDER, FOR SUSPENSION ORAL ONCE
Refills: 0 | Status: COMPLETED | OUTPATIENT
Start: 2024-06-06 | End: 2024-06-06

## 2024-06-06 RX ORDER — BUMETANIDE 0.25 MG/ML
1 INJECTION INTRAMUSCULAR; INTRAVENOUS ONCE
Refills: 0 | Status: DISCONTINUED | OUTPATIENT
Start: 2024-06-06 | End: 2024-06-06

## 2024-06-06 RX ADMIN — HEPARIN SODIUM 5000 UNIT(S): 50 INJECTION, SOLUTION INTRAVENOUS at 21:06

## 2024-06-06 RX ADMIN — Medication 1 APPLICATION(S): at 12:05

## 2024-06-06 RX ADMIN — ISOSORBIDE DINITRATE 5 MILLIGRAM(S): 5 TABLET ORAL at 12:13

## 2024-06-06 RX ADMIN — Medication 80 MILLIGRAM(S): at 05:15

## 2024-06-06 RX ADMIN — HYDRALAZINE HYDROCHLORIDE 50 MILLIGRAM(S): 50 TABLET ORAL at 05:17

## 2024-06-06 RX ADMIN — PANTOPRAZOLE SODIUM 40 MILLIGRAM(S): 40 INJECTION, POWDER, FOR SOLUTION INTRAVENOUS at 05:18

## 2024-06-06 RX ADMIN — Medication 650 MILLIGRAM(S): at 22:34

## 2024-06-06 RX ADMIN — SODIUM ZIRCONIUM CYCLOSILICATE 10 GRAM(S): 10 POWDER, FOR SUSPENSION ORAL at 20:23

## 2024-06-06 RX ADMIN — LABETALOL HYDROCHLORIDE 200 MILLIGRAM(S): 300 TABLET ORAL at 14:33

## 2024-06-06 RX ADMIN — PREDNISONE 10 MILLIGRAM(S): 10 TABLET ORAL at 05:16

## 2024-06-06 RX ADMIN — Medication 1000 UNIT(S): at 12:03

## 2024-06-06 RX ADMIN — Medication 1 TABLET(S): at 05:16

## 2024-06-06 RX ADMIN — IRON SUCROSE 110 MILLIGRAM(S): 20 INJECTION, SOLUTION INTRAVENOUS at 16:07

## 2024-06-06 RX ADMIN — Medication 25 GRAM(S): at 14:33

## 2024-06-06 RX ADMIN — Medication 80 MILLIGRAM(S): at 18:38

## 2024-06-06 RX ADMIN — Medication 1 TABLET(S): at 18:38

## 2024-06-06 RX ADMIN — LOSARTAN POTASSIUM 50 MILLIGRAM(S): 100 TABLET, FILM COATED ORAL at 21:04

## 2024-06-06 RX ADMIN — Medication 80 MILLIGRAM(S): at 12:03

## 2024-06-06 RX ADMIN — HYDRALAZINE HYDROCHLORIDE 50 MILLIGRAM(S): 50 TABLET ORAL at 21:06

## 2024-06-06 RX ADMIN — LABETALOL HYDROCHLORIDE 200 MILLIGRAM(S): 300 TABLET ORAL at 05:16

## 2024-06-06 RX ADMIN — Medication 25 GRAM(S): at 12:02

## 2024-06-06 RX ADMIN — ISOSORBIDE DINITRATE 5 MILLIGRAM(S): 5 TABLET ORAL at 16:07

## 2024-06-06 RX ADMIN — HEPARIN SODIUM 5000 UNIT(S): 50 INJECTION, SOLUTION INTRAVENOUS at 14:33

## 2024-06-06 RX ADMIN — HYDRALAZINE HYDROCHLORIDE 50 MILLIGRAM(S): 50 TABLET ORAL at 14:33

## 2024-06-06 RX ADMIN — LABETALOL HYDROCHLORIDE 200 MILLIGRAM(S): 300 TABLET ORAL at 21:04

## 2024-06-06 RX ADMIN — Medication 50 MILLILITER(S): at 16:06

## 2024-06-06 RX ADMIN — ISOSORBIDE DINITRATE 5 MILLIGRAM(S): 5 TABLET ORAL at 05:16

## 2024-06-06 RX ADMIN — Medication 1 GRAM(S): at 05:16

## 2024-06-07 LAB
ALBUMIN SERPL ELPH-MCNC: 2.7 G/DL — LOW (ref 3.5–5.2)
ALP SERPL-CCNC: 54 U/L — SIGNIFICANT CHANGE UP (ref 30–115)
ALT FLD-CCNC: 10 U/L — SIGNIFICANT CHANGE UP (ref 0–41)
ANION GAP SERPL CALC-SCNC: 11 MMOL/L — SIGNIFICANT CHANGE UP (ref 7–14)
ANION GAP SERPL CALC-SCNC: 9 MMOL/L — SIGNIFICANT CHANGE UP (ref 7–14)
ANION GAP SERPL CALC-SCNC: 9 MMOL/L — SIGNIFICANT CHANGE UP (ref 7–14)
AST SERPL-CCNC: 20 U/L — SIGNIFICANT CHANGE UP (ref 0–41)
BASOPHILS # BLD AUTO: 0.01 K/UL — SIGNIFICANT CHANGE UP (ref 0–0.2)
BASOPHILS NFR BLD AUTO: 0.2 % — SIGNIFICANT CHANGE UP (ref 0–1)
BILIRUB SERPL-MCNC: <0.2 MG/DL — SIGNIFICANT CHANGE UP (ref 0.2–1.2)
BUN SERPL-MCNC: 33 MG/DL — HIGH (ref 10–20)
BUN SERPL-MCNC: 34 MG/DL — HIGH (ref 10–20)
BUN SERPL-MCNC: 34 MG/DL — HIGH (ref 10–20)
CALCIUM SERPL-MCNC: 7.2 MG/DL — LOW (ref 8.4–10.4)
CALCIUM SERPL-MCNC: 7.2 MG/DL — LOW (ref 8.4–10.5)
CALCIUM SERPL-MCNC: 7.5 MG/DL — LOW (ref 8.4–10.5)
CHLORIDE SERPL-SCNC: 90 MMOL/L — LOW (ref 98–110)
CHLORIDE SERPL-SCNC: 91 MMOL/L — LOW (ref 98–110)
CHLORIDE SERPL-SCNC: 93 MMOL/L — LOW (ref 98–110)
CO2 SERPL-SCNC: 19 MMOL/L — SIGNIFICANT CHANGE UP (ref 17–32)
CO2 SERPL-SCNC: 19 MMOL/L — SIGNIFICANT CHANGE UP (ref 17–32)
CO2 SERPL-SCNC: 20 MMOL/L — SIGNIFICANT CHANGE UP (ref 17–32)
CREAT SERPL-MCNC: 1.8 MG/DL — HIGH (ref 0.7–1.5)
CREAT SERPL-MCNC: 1.9 MG/DL — HIGH (ref 0.7–1.5)
CREAT SERPL-MCNC: 2 MG/DL — HIGH (ref 0.7–1.5)
EGFR: 26 ML/MIN/1.73M2 — LOW
EGFR: 28 ML/MIN/1.73M2 — LOW
EGFR: 30 ML/MIN/1.73M2 — LOW
EOSINOPHIL # BLD AUTO: 0.11 K/UL — SIGNIFICANT CHANGE UP (ref 0–0.7)
EOSINOPHIL NFR BLD AUTO: 2.1 % — SIGNIFICANT CHANGE UP (ref 0–8)
GLUCOSE SERPL-MCNC: 121 MG/DL — HIGH (ref 70–99)
GLUCOSE SERPL-MCNC: 123 MG/DL — HIGH (ref 70–99)
GLUCOSE SERPL-MCNC: 91 MG/DL — SIGNIFICANT CHANGE UP (ref 70–99)
HCT VFR BLD CALC: 25 % — LOW (ref 37–47)
HGB BLD-MCNC: 8.2 G/DL — LOW (ref 12–16)
IMM GRANULOCYTES NFR BLD AUTO: 0.2 % — SIGNIFICANT CHANGE UP (ref 0.1–0.3)
LYMPHOCYTES # BLD AUTO: 0.68 K/UL — LOW (ref 1.2–3.4)
LYMPHOCYTES # BLD AUTO: 13.2 % — LOW (ref 20.5–51.1)
MAGNESIUM SERPL-MCNC: 2.2 MG/DL — SIGNIFICANT CHANGE UP (ref 1.8–2.4)
MCHC RBC-ENTMCNC: 31.1 PG — HIGH (ref 27–31)
MCHC RBC-ENTMCNC: 32.8 G/DL — SIGNIFICANT CHANGE UP (ref 32–37)
MCV RBC AUTO: 94.7 FL — SIGNIFICANT CHANGE UP (ref 81–99)
MONOCYTES # BLD AUTO: 0.5 K/UL — SIGNIFICANT CHANGE UP (ref 0.1–0.6)
MONOCYTES NFR BLD AUTO: 9.7 % — HIGH (ref 1.7–9.3)
NEUTROPHILS # BLD AUTO: 3.84 K/UL — SIGNIFICANT CHANGE UP (ref 1.4–6.5)
NEUTROPHILS NFR BLD AUTO: 74.6 % — SIGNIFICANT CHANGE UP (ref 42.2–75.2)
NRBC # BLD: 0 /100 WBCS — SIGNIFICANT CHANGE UP (ref 0–0)
PLATELET # BLD AUTO: 140 K/UL — SIGNIFICANT CHANGE UP (ref 130–400)
PMV BLD: 9.7 FL — SIGNIFICANT CHANGE UP (ref 7.4–10.4)
POTASSIUM SERPL-MCNC: 5.1 MMOL/L — HIGH (ref 3.5–5)
POTASSIUM SERPL-MCNC: 5.1 MMOL/L — HIGH (ref 3.5–5)
POTASSIUM SERPL-MCNC: 5.2 MMOL/L — HIGH (ref 3.5–5)
POTASSIUM SERPL-SCNC: 5.1 MMOL/L — HIGH (ref 3.5–5)
POTASSIUM SERPL-SCNC: 5.1 MMOL/L — HIGH (ref 3.5–5)
POTASSIUM SERPL-SCNC: 5.2 MMOL/L — HIGH (ref 3.5–5)
PROT SERPL-MCNC: 4.4 G/DL — LOW (ref 6–8)
RBC # BLD: 2.64 M/UL — LOW (ref 4.2–5.4)
RBC # FLD: 14 % — SIGNIFICANT CHANGE UP (ref 11.5–14.5)
SODIUM SERPL-SCNC: 119 MMOL/L — CRITICAL LOW (ref 135–146)
SODIUM SERPL-SCNC: 121 MMOL/L — LOW (ref 135–146)
SODIUM SERPL-SCNC: 121 MMOL/L — LOW (ref 135–146)
WBC # BLD: 5.15 K/UL — SIGNIFICANT CHANGE UP (ref 4.8–10.8)
WBC # FLD AUTO: 5.15 K/UL — SIGNIFICANT CHANGE UP (ref 4.8–10.8)

## 2024-06-07 PROCEDURE — 99233 SBSQ HOSP IP/OBS HIGH 50: CPT

## 2024-06-07 RX ORDER — SODIUM CHLORIDE 3 G/100ML
150 INJECTION, SOLUTION INTRAVENOUS
Refills: 0 | Status: DISCONTINUED | OUTPATIENT
Start: 2024-06-07 | End: 2024-06-09

## 2024-06-07 RX ORDER — ASPIRIN 325 MG/1
81 TABLET, FILM COATED ORAL DAILY
Refills: 0 | Status: DISCONTINUED | OUTPATIENT
Start: 2024-06-07 | End: 2024-06-17

## 2024-06-07 RX ORDER — BUMETANIDE 0.25 MG/ML
2 INJECTION INTRAMUSCULAR; INTRAVENOUS ONCE
Refills: 0 | Status: COMPLETED | OUTPATIENT
Start: 2024-06-07 | End: 2024-06-07

## 2024-06-07 RX ORDER — SODIUM CHLORIDE 3 G/100ML
150 INJECTION, SOLUTION INTRAVENOUS
Refills: 0 | Status: DISCONTINUED | OUTPATIENT
Start: 2024-06-07 | End: 2024-06-08

## 2024-06-07 RX ADMIN — HYDRALAZINE HYDROCHLORIDE 50 MILLIGRAM(S): 50 TABLET ORAL at 14:03

## 2024-06-07 RX ADMIN — HEPARIN SODIUM 5000 UNIT(S): 50 INJECTION, SOLUTION INTRAVENOUS at 21:22

## 2024-06-07 RX ADMIN — ASPIRIN 81 MILLIGRAM(S): 325 TABLET, FILM COATED ORAL at 12:38

## 2024-06-07 RX ADMIN — Medication 80 MILLIGRAM(S): at 05:17

## 2024-06-07 RX ADMIN — Medication 1 TABLET(S): at 05:17

## 2024-06-07 RX ADMIN — SODIUM CHLORIDE 50 MILLILITER(S): 3 INJECTION, SOLUTION INTRAVENOUS at 12:32

## 2024-06-07 RX ADMIN — LABETALOL HYDROCHLORIDE 200 MILLIGRAM(S): 300 TABLET ORAL at 14:03

## 2024-06-07 RX ADMIN — Medication 80 MILLIGRAM(S): at 00:39

## 2024-06-07 RX ADMIN — PREDNISONE 10 MILLIGRAM(S): 10 TABLET ORAL at 05:17

## 2024-06-07 RX ADMIN — CALCITRIOL 0.25 MICROGRAM(S): 0.25 CAPSULE, LIQUID FILLED ORAL at 12:35

## 2024-06-07 RX ADMIN — HYDRALAZINE HYDROCHLORIDE 50 MILLIGRAM(S): 50 TABLET ORAL at 05:17

## 2024-06-07 RX ADMIN — Medication 80 MILLIGRAM(S): at 23:14

## 2024-06-07 RX ADMIN — LOSARTAN POTASSIUM 50 MILLIGRAM(S): 100 TABLET, FILM COATED ORAL at 21:22

## 2024-06-07 RX ADMIN — Medication 80 MILLIGRAM(S): at 17:36

## 2024-06-07 RX ADMIN — ISOSORBIDE DINITRATE 5 MILLIGRAM(S): 5 TABLET ORAL at 17:37

## 2024-06-07 RX ADMIN — Medication 80 MILLIGRAM(S): at 12:35

## 2024-06-07 RX ADMIN — Medication 1 TABLET(S): at 17:36

## 2024-06-07 RX ADMIN — LABETALOL HYDROCHLORIDE 200 MILLIGRAM(S): 300 TABLET ORAL at 21:22

## 2024-06-07 RX ADMIN — ISOSORBIDE DINITRATE 5 MILLIGRAM(S): 5 TABLET ORAL at 05:17

## 2024-06-07 RX ADMIN — ISOSORBIDE DINITRATE 5 MILLIGRAM(S): 5 TABLET ORAL at 12:34

## 2024-06-07 RX ADMIN — Medication 1000 UNIT(S): at 12:35

## 2024-06-07 RX ADMIN — IRON SUCROSE 110 MILLIGRAM(S): 20 INJECTION, SOLUTION INTRAVENOUS at 17:37

## 2024-06-07 RX ADMIN — Medication 650 MILLIGRAM(S): at 09:20

## 2024-06-07 RX ADMIN — LABETALOL HYDROCHLORIDE 200 MILLIGRAM(S): 300 TABLET ORAL at 05:17

## 2024-06-07 RX ADMIN — HEPARIN SODIUM 5000 UNIT(S): 50 INJECTION, SOLUTION INTRAVENOUS at 05:17

## 2024-06-07 RX ADMIN — HEPARIN SODIUM 5000 UNIT(S): 50 INJECTION, SOLUTION INTRAVENOUS at 14:02

## 2024-06-07 RX ADMIN — PANTOPRAZOLE SODIUM 40 MILLIGRAM(S): 40 INJECTION, POWDER, FOR SOLUTION INTRAVENOUS at 05:17

## 2024-06-07 RX ADMIN — Medication 1 APPLICATION(S): at 12:35

## 2024-06-07 RX ADMIN — BUMETANIDE 2 MILLIGRAM(S): 0.25 INJECTION INTRAMUSCULAR; INTRAVENOUS at 12:49

## 2024-06-07 RX ADMIN — HYDRALAZINE HYDROCHLORIDE 50 MILLIGRAM(S): 50 TABLET ORAL at 21:23

## 2024-06-08 LAB
ALBUMIN SERPL ELPH-MCNC: 2.6 G/DL — LOW (ref 3.5–5.2)
ALP SERPL-CCNC: 55 U/L — SIGNIFICANT CHANGE UP (ref 30–115)
ALT FLD-CCNC: 11 U/L — SIGNIFICANT CHANGE UP (ref 0–41)
ANION GAP SERPL CALC-SCNC: 10 MMOL/L — SIGNIFICANT CHANGE UP (ref 7–14)
ANION GAP SERPL CALC-SCNC: 8 MMOL/L — SIGNIFICANT CHANGE UP (ref 7–14)
AST SERPL-CCNC: 21 U/L — SIGNIFICANT CHANGE UP (ref 0–41)
BASOPHILS # BLD AUTO: 0.01 K/UL — SIGNIFICANT CHANGE UP (ref 0–0.2)
BASOPHILS NFR BLD AUTO: 0.2 % — SIGNIFICANT CHANGE UP (ref 0–1)
BILIRUB SERPL-MCNC: <0.2 MG/DL — SIGNIFICANT CHANGE UP (ref 0.2–1.2)
BUN SERPL-MCNC: 33 MG/DL — HIGH (ref 10–20)
BUN SERPL-MCNC: 35 MG/DL — HIGH (ref 10–20)
CALCIUM SERPL-MCNC: 7.4 MG/DL — LOW (ref 8.4–10.4)
CALCIUM SERPL-MCNC: 7.7 MG/DL — LOW (ref 8.4–10.5)
CHLORIDE SERPL-SCNC: 92 MMOL/L — LOW (ref 98–110)
CHLORIDE SERPL-SCNC: 93 MMOL/L — LOW (ref 98–110)
CO2 SERPL-SCNC: 19 MMOL/L — SIGNIFICANT CHANGE UP (ref 17–32)
CO2 SERPL-SCNC: 21 MMOL/L — SIGNIFICANT CHANGE UP (ref 17–32)
CREAT SERPL-MCNC: 2 MG/DL — HIGH (ref 0.7–1.5)
CREAT SERPL-MCNC: 2 MG/DL — HIGH (ref 0.7–1.5)
EGFR: 26 ML/MIN/1.73M2 — LOW
EGFR: 26 ML/MIN/1.73M2 — LOW
EOSINOPHIL # BLD AUTO: 0.15 K/UL — SIGNIFICANT CHANGE UP (ref 0–0.7)
EOSINOPHIL NFR BLD AUTO: 3.2 % — SIGNIFICANT CHANGE UP (ref 0–8)
GLUCOSE SERPL-MCNC: 100 MG/DL — HIGH (ref 70–99)
GLUCOSE SERPL-MCNC: 92 MG/DL — SIGNIFICANT CHANGE UP (ref 70–99)
HCT VFR BLD CALC: 23.7 % — LOW (ref 37–47)
HGB BLD-MCNC: 7.9 G/DL — LOW (ref 12–16)
IMM GRANULOCYTES NFR BLD AUTO: 0.4 % — HIGH (ref 0.1–0.3)
LYMPHOCYTES # BLD AUTO: 0.77 K/UL — LOW (ref 1.2–3.4)
LYMPHOCYTES # BLD AUTO: 16.6 % — LOW (ref 20.5–51.1)
MAGNESIUM SERPL-MCNC: 1.8 MG/DL — SIGNIFICANT CHANGE UP (ref 1.8–2.4)
MCHC RBC-ENTMCNC: 31 PG — SIGNIFICANT CHANGE UP (ref 27–31)
MCHC RBC-ENTMCNC: 33.3 G/DL — SIGNIFICANT CHANGE UP (ref 32–37)
MCV RBC AUTO: 92.9 FL — SIGNIFICANT CHANGE UP (ref 81–99)
MONOCYTES # BLD AUTO: 0.54 K/UL — SIGNIFICANT CHANGE UP (ref 0.1–0.6)
MONOCYTES NFR BLD AUTO: 11.6 % — HIGH (ref 1.7–9.3)
NEUTROPHILS # BLD AUTO: 3.15 K/UL — SIGNIFICANT CHANGE UP (ref 1.4–6.5)
NEUTROPHILS NFR BLD AUTO: 68 % — SIGNIFICANT CHANGE UP (ref 42.2–75.2)
NRBC # BLD: 0 /100 WBCS — SIGNIFICANT CHANGE UP (ref 0–0)
PLATELET # BLD AUTO: 145 K/UL — SIGNIFICANT CHANGE UP (ref 130–400)
PMV BLD: 9.4 FL — SIGNIFICANT CHANGE UP (ref 7.4–10.4)
POTASSIUM SERPL-MCNC: 5 MMOL/L — SIGNIFICANT CHANGE UP (ref 3.5–5)
POTASSIUM SERPL-MCNC: 5.3 MMOL/L — HIGH (ref 3.5–5)
POTASSIUM SERPL-SCNC: 5 MMOL/L — SIGNIFICANT CHANGE UP (ref 3.5–5)
POTASSIUM SERPL-SCNC: 5.3 MMOL/L — HIGH (ref 3.5–5)
PROT SERPL-MCNC: 4.4 G/DL — LOW (ref 6–8)
RBC # BLD: 2.55 M/UL — LOW (ref 4.2–5.4)
RBC # FLD: 14 % — SIGNIFICANT CHANGE UP (ref 11.5–14.5)
SODIUM SERPL-SCNC: 121 MMOL/L — LOW (ref 135–146)
SODIUM SERPL-SCNC: 122 MMOL/L — LOW (ref 135–146)
WBC # BLD: 4.64 K/UL — LOW (ref 4.8–10.8)
WBC # FLD AUTO: 4.64 K/UL — LOW (ref 4.8–10.8)

## 2024-06-08 PROCEDURE — 99233 SBSQ HOSP IP/OBS HIGH 50: CPT

## 2024-06-08 RX ORDER — SODIUM CHLORIDE 3 G/100ML
150 INJECTION, SOLUTION INTRAVENOUS
Refills: 0 | Status: DISCONTINUED | OUTPATIENT
Start: 2024-06-08 | End: 2024-06-09

## 2024-06-08 RX ORDER — BUMETANIDE 0.25 MG/ML
2 INJECTION INTRAMUSCULAR; INTRAVENOUS ONCE
Refills: 0 | Status: COMPLETED | OUTPATIENT
Start: 2024-06-08 | End: 2024-06-08

## 2024-06-08 RX ORDER — UREA 40 G
15 VIAL (EA) INTRAVENOUS DAILY
Refills: 0 | Status: DISCONTINUED | OUTPATIENT
Start: 2024-06-08 | End: 2024-06-12

## 2024-06-08 RX ADMIN — HEPARIN SODIUM 5000 UNIT(S): 50 INJECTION, SOLUTION INTRAVENOUS at 21:50

## 2024-06-08 RX ADMIN — LABETALOL HYDROCHLORIDE 200 MILLIGRAM(S): 300 TABLET ORAL at 21:50

## 2024-06-08 RX ADMIN — Medication 1 TABLET(S): at 17:11

## 2024-06-08 RX ADMIN — SODIUM CHLORIDE 50 MILLILITER(S): 3 INJECTION, SOLUTION INTRAVENOUS at 00:32

## 2024-06-08 RX ADMIN — ISOSORBIDE DINITRATE 5 MILLIGRAM(S): 5 TABLET ORAL at 17:10

## 2024-06-08 RX ADMIN — Medication 650 MILLIGRAM(S): at 02:41

## 2024-06-08 RX ADMIN — ASPIRIN 81 MILLIGRAM(S): 325 TABLET, FILM COATED ORAL at 11:44

## 2024-06-08 RX ADMIN — HYDRALAZINE HYDROCHLORIDE 50 MILLIGRAM(S): 50 TABLET ORAL at 05:26

## 2024-06-08 RX ADMIN — Medication 1 TABLET(S): at 05:27

## 2024-06-08 RX ADMIN — Medication 1 APPLICATION(S): at 11:44

## 2024-06-08 RX ADMIN — PANTOPRAZOLE SODIUM 40 MILLIGRAM(S): 40 INJECTION, POWDER, FOR SOLUTION INTRAVENOUS at 05:28

## 2024-06-08 RX ADMIN — CALCITRIOL 0.25 MICROGRAM(S): 0.25 CAPSULE, LIQUID FILLED ORAL at 11:44

## 2024-06-08 RX ADMIN — ISOSORBIDE DINITRATE 5 MILLIGRAM(S): 5 TABLET ORAL at 11:43

## 2024-06-08 RX ADMIN — HYDRALAZINE HYDROCHLORIDE 50 MILLIGRAM(S): 50 TABLET ORAL at 21:50

## 2024-06-08 RX ADMIN — LABETALOL HYDROCHLORIDE 200 MILLIGRAM(S): 300 TABLET ORAL at 05:27

## 2024-06-08 RX ADMIN — IRON SUCROSE 110 MILLIGRAM(S): 20 INJECTION, SOLUTION INTRAVENOUS at 17:11

## 2024-06-08 RX ADMIN — Medication 80 MILLIGRAM(S): at 23:42

## 2024-06-08 RX ADMIN — HEPARIN SODIUM 5000 UNIT(S): 50 INJECTION, SOLUTION INTRAVENOUS at 05:26

## 2024-06-08 RX ADMIN — ISOSORBIDE DINITRATE 5 MILLIGRAM(S): 5 TABLET ORAL at 05:27

## 2024-06-08 RX ADMIN — BUMETANIDE 2 MILLIGRAM(S): 0.25 INJECTION INTRAMUSCULAR; INTRAVENOUS at 11:56

## 2024-06-08 RX ADMIN — Medication 3 MILLIGRAM(S): at 21:50

## 2024-06-08 RX ADMIN — HYDRALAZINE HYDROCHLORIDE 50 MILLIGRAM(S): 50 TABLET ORAL at 15:43

## 2024-06-08 RX ADMIN — Medication 650 MILLIGRAM(S): at 03:30

## 2024-06-08 RX ADMIN — LABETALOL HYDROCHLORIDE 200 MILLIGRAM(S): 300 TABLET ORAL at 15:43

## 2024-06-08 RX ADMIN — Medication 30 MILLIGRAM(S): at 09:05

## 2024-06-08 RX ADMIN — Medication 80 MILLIGRAM(S): at 11:43

## 2024-06-08 RX ADMIN — PREDNISONE 10 MILLIGRAM(S): 10 TABLET ORAL at 05:27

## 2024-06-08 RX ADMIN — Medication 80 MILLIGRAM(S): at 17:11

## 2024-06-08 RX ADMIN — LOSARTAN POTASSIUM 50 MILLIGRAM(S): 100 TABLET, FILM COATED ORAL at 21:50

## 2024-06-08 RX ADMIN — Medication 15 GRAM(S): at 11:43

## 2024-06-08 RX ADMIN — HEPARIN SODIUM 5000 UNIT(S): 50 INJECTION, SOLUTION INTRAVENOUS at 15:43

## 2024-06-08 RX ADMIN — Medication 1000 UNIT(S): at 11:45

## 2024-06-08 RX ADMIN — Medication 80 MILLIGRAM(S): at 05:27

## 2024-06-09 LAB
ALBUMIN SERPL ELPH-MCNC: 2.8 G/DL — LOW (ref 3.5–5.2)
ALP SERPL-CCNC: 53 U/L — SIGNIFICANT CHANGE UP (ref 30–115)
ALT FLD-CCNC: 11 U/L — SIGNIFICANT CHANGE UP (ref 0–41)
ANION GAP SERPL CALC-SCNC: 6 MMOL/L — LOW (ref 7–14)
AST SERPL-CCNC: 20 U/L — SIGNIFICANT CHANGE UP (ref 0–41)
BASOPHILS # BLD AUTO: 0.02 K/UL — SIGNIFICANT CHANGE UP (ref 0–0.2)
BASOPHILS NFR BLD AUTO: 0.4 % — SIGNIFICANT CHANGE UP (ref 0–1)
BILIRUB SERPL-MCNC: 0.3 MG/DL — SIGNIFICANT CHANGE UP (ref 0.2–1.2)
BUN SERPL-MCNC: 45 MG/DL — HIGH (ref 10–20)
CALCIUM SERPL-MCNC: 8.2 MG/DL — LOW (ref 8.4–10.5)
CHLORIDE SERPL-SCNC: 93 MMOL/L — LOW (ref 98–110)
CO2 SERPL-SCNC: 25 MMOL/L — SIGNIFICANT CHANGE UP (ref 17–32)
CREAT SERPL-MCNC: 2 MG/DL — HIGH (ref 0.7–1.5)
EGFR: 26 ML/MIN/1.73M2 — LOW
EOSINOPHIL # BLD AUTO: 0.2 K/UL — SIGNIFICANT CHANGE UP (ref 0–0.7)
EOSINOPHIL NFR BLD AUTO: 4.1 % — SIGNIFICANT CHANGE UP (ref 0–8)
GLUCOSE SERPL-MCNC: 81 MG/DL — SIGNIFICANT CHANGE UP (ref 70–99)
HCT VFR BLD CALC: 25.5 % — LOW (ref 37–47)
HGB BLD-MCNC: 8.7 G/DL — LOW (ref 12–16)
IMM GRANULOCYTES NFR BLD AUTO: 0.4 % — HIGH (ref 0.1–0.3)
LYMPHOCYTES # BLD AUTO: 0.78 K/UL — LOW (ref 1.2–3.4)
LYMPHOCYTES # BLD AUTO: 16 % — LOW (ref 20.5–51.1)
MAGNESIUM SERPL-MCNC: 1.8 MG/DL — SIGNIFICANT CHANGE UP (ref 1.8–2.4)
MCHC RBC-ENTMCNC: 31.6 PG — HIGH (ref 27–31)
MCHC RBC-ENTMCNC: 34.1 G/DL — SIGNIFICANT CHANGE UP (ref 32–37)
MCV RBC AUTO: 92.7 FL — SIGNIFICANT CHANGE UP (ref 81–99)
MONOCYTES # BLD AUTO: 0.6 K/UL — SIGNIFICANT CHANGE UP (ref 0.1–0.6)
MONOCYTES NFR BLD AUTO: 12.3 % — HIGH (ref 1.7–9.3)
NEUTROPHILS # BLD AUTO: 3.24 K/UL — SIGNIFICANT CHANGE UP (ref 1.4–6.5)
NEUTROPHILS NFR BLD AUTO: 66.8 % — SIGNIFICANT CHANGE UP (ref 42.2–75.2)
NRBC # BLD: 0 /100 WBCS — SIGNIFICANT CHANGE UP (ref 0–0)
OSMOLALITY SERPL: 271 MOS/KG — LOW (ref 280–301)
PLATELET # BLD AUTO: 157 K/UL — SIGNIFICANT CHANGE UP (ref 130–400)
PMV BLD: 9.7 FL — SIGNIFICANT CHANGE UP (ref 7.4–10.4)
POTASSIUM SERPL-MCNC: 5.4 MMOL/L — HIGH (ref 3.5–5)
POTASSIUM SERPL-SCNC: 5.4 MMOL/L — HIGH (ref 3.5–5)
PROT SERPL-MCNC: 4.8 G/DL — LOW (ref 6–8)
RBC # BLD: 2.75 M/UL — LOW (ref 4.2–5.4)
RBC # FLD: 14.1 % — SIGNIFICANT CHANGE UP (ref 11.5–14.5)
SODIUM SERPL-SCNC: 124 MMOL/L — LOW (ref 135–146)
WBC # BLD: 4.86 K/UL — SIGNIFICANT CHANGE UP (ref 4.8–10.8)
WBC # FLD AUTO: 4.86 K/UL — SIGNIFICANT CHANGE UP (ref 4.8–10.8)

## 2024-06-09 PROCEDURE — 99233 SBSQ HOSP IP/OBS HIGH 50: CPT

## 2024-06-09 RX ORDER — LIDOCAINE HCL 28 MG/G
1 GEL TOPICAL ONCE
Refills: 0 | Status: COMPLETED | OUTPATIENT
Start: 2024-06-09 | End: 2024-06-09

## 2024-06-09 RX ORDER — LABETALOL HYDROCHLORIDE 300 MG/1
300 TABLET ORAL EVERY 8 HOURS
Refills: 0 | Status: DISCONTINUED | OUTPATIENT
Start: 2024-06-09 | End: 2024-06-17

## 2024-06-09 RX ORDER — BUMETANIDE 0.25 MG/ML
2 INJECTION INTRAMUSCULAR; INTRAVENOUS DAILY
Refills: 0 | Status: DISCONTINUED | OUTPATIENT
Start: 2024-06-09 | End: 2024-06-12

## 2024-06-09 RX ORDER — HYDRALAZINE HYDROCHLORIDE 50 MG/1
75 TABLET ORAL EVERY 8 HOURS
Refills: 0 | Status: DISCONTINUED | OUTPATIENT
Start: 2024-06-09 | End: 2024-06-17

## 2024-06-09 RX ADMIN — ISOSORBIDE DINITRATE 5 MILLIGRAM(S): 5 TABLET ORAL at 06:13

## 2024-06-09 RX ADMIN — LABETALOL HYDROCHLORIDE 300 MILLIGRAM(S): 300 TABLET ORAL at 21:30

## 2024-06-09 RX ADMIN — Medication 80 MILLIGRAM(S): at 14:51

## 2024-06-09 RX ADMIN — Medication 15 GRAM(S): at 15:46

## 2024-06-09 RX ADMIN — PREDNISONE 10 MILLIGRAM(S): 10 TABLET ORAL at 06:13

## 2024-06-09 RX ADMIN — HEPARIN SODIUM 5000 UNIT(S): 50 INJECTION, SOLUTION INTRAVENOUS at 06:14

## 2024-06-09 RX ADMIN — Medication 1000 UNIT(S): at 14:52

## 2024-06-09 RX ADMIN — Medication 80 MILLIGRAM(S): at 06:13

## 2024-06-09 RX ADMIN — HYDRALAZINE HYDROCHLORIDE 50 MILLIGRAM(S): 50 TABLET ORAL at 06:13

## 2024-06-09 RX ADMIN — CALCITRIOL 0.25 MICROGRAM(S): 0.25 CAPSULE, LIQUID FILLED ORAL at 15:46

## 2024-06-09 RX ADMIN — HEPARIN SODIUM 5000 UNIT(S): 50 INJECTION, SOLUTION INTRAVENOUS at 21:30

## 2024-06-09 RX ADMIN — ASPIRIN 81 MILLIGRAM(S): 325 TABLET, FILM COATED ORAL at 14:51

## 2024-06-09 RX ADMIN — LABETALOL HYDROCHLORIDE 300 MILLIGRAM(S): 300 TABLET ORAL at 13:52

## 2024-06-09 RX ADMIN — HYDRALAZINE HYDROCHLORIDE 75 MILLIGRAM(S): 50 TABLET ORAL at 21:30

## 2024-06-09 RX ADMIN — Medication 80 MILLIGRAM(S): at 23:51

## 2024-06-09 RX ADMIN — LIDOCAINE HCL 1 PATCH: 28 GEL TOPICAL at 21:28

## 2024-06-09 RX ADMIN — LABETALOL HYDROCHLORIDE 200 MILLIGRAM(S): 300 TABLET ORAL at 06:14

## 2024-06-09 RX ADMIN — HYDRALAZINE HYDROCHLORIDE 75 MILLIGRAM(S): 50 TABLET ORAL at 13:51

## 2024-06-09 RX ADMIN — IRON SUCROSE 110 MILLIGRAM(S): 20 INJECTION, SOLUTION INTRAVENOUS at 18:21

## 2024-06-09 RX ADMIN — ISOSORBIDE DINITRATE 5 MILLIGRAM(S): 5 TABLET ORAL at 21:30

## 2024-06-09 RX ADMIN — Medication 1 APPLICATION(S): at 14:52

## 2024-06-09 RX ADMIN — LOSARTAN POTASSIUM 50 MILLIGRAM(S): 100 TABLET, FILM COATED ORAL at 21:30

## 2024-06-09 RX ADMIN — Medication 1 TABLET(S): at 18:21

## 2024-06-09 RX ADMIN — Medication 80 MILLIGRAM(S): at 18:20

## 2024-06-09 RX ADMIN — HEPARIN SODIUM 5000 UNIT(S): 50 INJECTION, SOLUTION INTRAVENOUS at 14:50

## 2024-06-09 RX ADMIN — PANTOPRAZOLE SODIUM 40 MILLIGRAM(S): 40 INJECTION, POWDER, FOR SOLUTION INTRAVENOUS at 06:13

## 2024-06-09 RX ADMIN — ISOSORBIDE DINITRATE 5 MILLIGRAM(S): 5 TABLET ORAL at 14:51

## 2024-06-09 RX ADMIN — Medication 1 TABLET(S): at 06:14

## 2024-06-10 LAB
ALBUMIN SERPL ELPH-MCNC: 2.9 G/DL — LOW (ref 3.5–5.2)
ALP SERPL-CCNC: 58 U/L — SIGNIFICANT CHANGE UP (ref 30–115)
ALT FLD-CCNC: 11 U/L — SIGNIFICANT CHANGE UP (ref 0–41)
ANION GAP SERPL CALC-SCNC: 8 MMOL/L — SIGNIFICANT CHANGE UP (ref 7–14)
AST SERPL-CCNC: 19 U/L — SIGNIFICANT CHANGE UP (ref 0–41)
BASOPHILS # BLD AUTO: 0.01 K/UL — SIGNIFICANT CHANGE UP (ref 0–0.2)
BASOPHILS NFR BLD AUTO: 0.3 % — SIGNIFICANT CHANGE UP (ref 0–1)
BILIRUB SERPL-MCNC: 0.2 MG/DL — SIGNIFICANT CHANGE UP (ref 0.2–1.2)
BUN SERPL-MCNC: 73 MG/DL — CRITICAL HIGH (ref 10–20)
CALCIUM SERPL-MCNC: 8.7 MG/DL — SIGNIFICANT CHANGE UP (ref 8.4–10.4)
CHLORIDE SERPL-SCNC: 91 MMOL/L — LOW (ref 98–110)
CO2 SERPL-SCNC: 22 MMOL/L — SIGNIFICANT CHANGE UP (ref 17–32)
CREAT SERPL-MCNC: 2.2 MG/DL — HIGH (ref 0.7–1.5)
EGFR: 24 ML/MIN/1.73M2 — LOW
EOSINOPHIL # BLD AUTO: 0.01 K/UL — SIGNIFICANT CHANGE UP (ref 0–0.7)
EOSINOPHIL NFR BLD AUTO: 0.3 % — SIGNIFICANT CHANGE UP (ref 0–8)
GLUCOSE SERPL-MCNC: 155 MG/DL — HIGH (ref 70–99)
HCT VFR BLD CALC: 24.7 % — LOW (ref 37–47)
HGB BLD-MCNC: 8.2 G/DL — LOW (ref 12–16)
IMM GRANULOCYTES NFR BLD AUTO: 0.3 % — SIGNIFICANT CHANGE UP (ref 0.1–0.3)
LYMPHOCYTES # BLD AUTO: 0.33 K/UL — LOW (ref 1.2–3.4)
LYMPHOCYTES # BLD AUTO: 8.4 % — LOW (ref 20.5–51.1)
MAGNESIUM SERPL-MCNC: 1.7 MG/DL — LOW (ref 1.8–2.4)
MCHC RBC-ENTMCNC: 31.3 PG — HIGH (ref 27–31)
MCHC RBC-ENTMCNC: 33.2 G/DL — SIGNIFICANT CHANGE UP (ref 32–37)
MCV RBC AUTO: 94.3 FL — SIGNIFICANT CHANGE UP (ref 81–99)
MONOCYTES # BLD AUTO: 0.24 K/UL — SIGNIFICANT CHANGE UP (ref 0.1–0.6)
MONOCYTES NFR BLD AUTO: 6.1 % — SIGNIFICANT CHANGE UP (ref 1.7–9.3)
NEUTROPHILS # BLD AUTO: 3.32 K/UL — SIGNIFICANT CHANGE UP (ref 1.4–6.5)
NEUTROPHILS NFR BLD AUTO: 84.6 % — HIGH (ref 42.2–75.2)
NRBC # BLD: 0 /100 WBCS — SIGNIFICANT CHANGE UP (ref 0–0)
PLATELET # BLD AUTO: 140 K/UL — SIGNIFICANT CHANGE UP (ref 130–400)
PMV BLD: 9.3 FL — SIGNIFICANT CHANGE UP (ref 7.4–10.4)
POTASSIUM SERPL-MCNC: 5.6 MMOL/L — HIGH (ref 3.5–5)
POTASSIUM SERPL-SCNC: 5.6 MMOL/L — HIGH (ref 3.5–5)
PROT SERPL-MCNC: 4.6 G/DL — LOW (ref 6–8)
PROT SERPL-MCNC: 4.8 G/DL — LOW (ref 6–8.3)
RBC # BLD: 2.62 M/UL — LOW (ref 4.2–5.4)
RBC # FLD: 14 % — SIGNIFICANT CHANGE UP (ref 11.5–14.5)
SODIUM SERPL-SCNC: 121 MMOL/L — LOW (ref 135–146)
WBC # BLD: 3.92 K/UL — LOW (ref 4.8–10.8)
WBC # FLD AUTO: 3.92 K/UL — LOW (ref 4.8–10.8)

## 2024-06-10 PROCEDURE — 71045 X-RAY EXAM CHEST 1 VIEW: CPT | Mod: 26

## 2024-06-10 PROCEDURE — 99233 SBSQ HOSP IP/OBS HIGH 50: CPT

## 2024-06-10 RX ORDER — BUMETANIDE 0.25 MG/ML
2 INJECTION INTRAMUSCULAR; INTRAVENOUS ONCE
Refills: 0 | Status: DISCONTINUED | OUTPATIENT
Start: 2024-06-10 | End: 2024-06-10

## 2024-06-10 RX ORDER — SODIUM ZIRCONIUM CYCLOSILICATE 10 G/10G
10 POWDER, FOR SUSPENSION ORAL ONCE
Refills: 0 | Status: COMPLETED | OUTPATIENT
Start: 2024-06-10 | End: 2024-06-10

## 2024-06-10 RX ORDER — MAGNESIUM SULFATE 100 %
2 POWDER (GRAM) MISCELLANEOUS ONCE
Refills: 0 | Status: COMPLETED | OUTPATIENT
Start: 2024-06-10 | End: 2024-06-10

## 2024-06-10 RX ADMIN — Medication 80 MILLIGRAM(S): at 11:37

## 2024-06-10 RX ADMIN — HEPARIN SODIUM 5000 UNIT(S): 50 INJECTION, SOLUTION INTRAVENOUS at 14:06

## 2024-06-10 RX ADMIN — BUMETANIDE 2 MILLIGRAM(S): 0.25 INJECTION INTRAMUSCULAR; INTRAVENOUS at 05:45

## 2024-06-10 RX ADMIN — CALCITRIOL 0.25 MICROGRAM(S): 0.25 CAPSULE, LIQUID FILLED ORAL at 11:37

## 2024-06-10 RX ADMIN — LOSARTAN POTASSIUM 50 MILLIGRAM(S): 100 TABLET, FILM COATED ORAL at 21:24

## 2024-06-10 RX ADMIN — PREDNISONE 10 MILLIGRAM(S): 10 TABLET ORAL at 05:46

## 2024-06-10 RX ADMIN — ISOSORBIDE DINITRATE 5 MILLIGRAM(S): 5 TABLET ORAL at 17:04

## 2024-06-10 RX ADMIN — SODIUM ZIRCONIUM CYCLOSILICATE 10 GRAM(S): 10 POWDER, FOR SUSPENSION ORAL at 18:59

## 2024-06-10 RX ADMIN — LABETALOL HYDROCHLORIDE 300 MILLIGRAM(S): 300 TABLET ORAL at 05:46

## 2024-06-10 RX ADMIN — HEPARIN SODIUM 5000 UNIT(S): 50 INJECTION, SOLUTION INTRAVENOUS at 05:45

## 2024-06-10 RX ADMIN — Medication 25 GRAM(S): at 18:59

## 2024-06-10 RX ADMIN — Medication 1 APPLICATION(S): at 11:37

## 2024-06-10 RX ADMIN — Medication 1000 UNIT(S): at 11:36

## 2024-06-10 RX ADMIN — LABETALOL HYDROCHLORIDE 300 MILLIGRAM(S): 300 TABLET ORAL at 21:24

## 2024-06-10 RX ADMIN — Medication 1 TABLET(S): at 05:46

## 2024-06-10 RX ADMIN — ISOSORBIDE DINITRATE 5 MILLIGRAM(S): 5 TABLET ORAL at 11:37

## 2024-06-10 RX ADMIN — HYDRALAZINE HYDROCHLORIDE 75 MILLIGRAM(S): 50 TABLET ORAL at 05:45

## 2024-06-10 RX ADMIN — IRON SUCROSE 110 MILLIGRAM(S): 20 INJECTION, SOLUTION INTRAVENOUS at 17:04

## 2024-06-10 RX ADMIN — Medication 1 TABLET(S): at 17:03

## 2024-06-10 RX ADMIN — LABETALOL HYDROCHLORIDE 300 MILLIGRAM(S): 300 TABLET ORAL at 14:06

## 2024-06-10 RX ADMIN — PANTOPRAZOLE SODIUM 40 MILLIGRAM(S): 40 INJECTION, POWDER, FOR SOLUTION INTRAVENOUS at 05:45

## 2024-06-10 RX ADMIN — Medication 80 MILLIGRAM(S): at 05:45

## 2024-06-10 RX ADMIN — ISOSORBIDE DINITRATE 5 MILLIGRAM(S): 5 TABLET ORAL at 05:46

## 2024-06-10 RX ADMIN — Medication 15 GRAM(S): at 11:37

## 2024-06-10 RX ADMIN — HYDRALAZINE HYDROCHLORIDE 75 MILLIGRAM(S): 50 TABLET ORAL at 21:24

## 2024-06-10 RX ADMIN — Medication 80 MILLIGRAM(S): at 17:04

## 2024-06-10 RX ADMIN — HYDRALAZINE HYDROCHLORIDE 75 MILLIGRAM(S): 50 TABLET ORAL at 14:06

## 2024-06-10 RX ADMIN — HEPARIN SODIUM 5000 UNIT(S): 50 INJECTION, SOLUTION INTRAVENOUS at 21:24

## 2024-06-10 RX ADMIN — ASPIRIN 81 MILLIGRAM(S): 325 TABLET, FILM COATED ORAL at 11:37

## 2024-06-11 LAB
ALBUMIN SERPL ELPH-MCNC: 2.7 G/DL — LOW (ref 3.5–5.2)
ALP SERPL-CCNC: 50 U/L — SIGNIFICANT CHANGE UP (ref 30–115)
ALT FLD-CCNC: 9 U/L — SIGNIFICANT CHANGE UP (ref 0–41)
ANION GAP SERPL CALC-SCNC: 8 MMOL/L — SIGNIFICANT CHANGE UP (ref 7–14)
AST SERPL-CCNC: 17 U/L — SIGNIFICANT CHANGE UP (ref 0–41)
BASOPHILS # BLD AUTO: 0.02 K/UL — SIGNIFICANT CHANGE UP (ref 0–0.2)
BASOPHILS NFR BLD AUTO: 0.5 % — SIGNIFICANT CHANGE UP (ref 0–1)
BILIRUB SERPL-MCNC: 0.3 MG/DL — SIGNIFICANT CHANGE UP (ref 0.2–1.2)
BUN SERPL-MCNC: 72 MG/DL — CRITICAL HIGH (ref 10–20)
CALCIUM SERPL-MCNC: 8.9 MG/DL — SIGNIFICANT CHANGE UP (ref 8.4–10.5)
CHLORIDE SERPL-SCNC: 91 MMOL/L — LOW (ref 98–110)
CO2 SERPL-SCNC: 24 MMOL/L — SIGNIFICANT CHANGE UP (ref 17–32)
CREAT SERPL-MCNC: 2.2 MG/DL — HIGH (ref 0.7–1.5)
CREATININE, URINE RESULT: 32 MG/DL — SIGNIFICANT CHANGE UP
EGFR: 24 ML/MIN/1.73M2 — LOW
EOSINOPHIL # BLD AUTO: 0.22 K/UL — SIGNIFICANT CHANGE UP (ref 0–0.7)
EOSINOPHIL NFR BLD AUTO: 5.4 % — SIGNIFICANT CHANGE UP (ref 0–8)
GLUCOSE SERPL-MCNC: 104 MG/DL — HIGH (ref 70–99)
HCT VFR BLD CALC: 23.2 % — LOW (ref 37–47)
HGB BLD-MCNC: 7.8 G/DL — LOW (ref 12–16)
IGA FLD-MCNC: 149 MG/DL — SIGNIFICANT CHANGE UP (ref 84–499)
IGG FLD-MCNC: 648 MG/DL — SIGNIFICANT CHANGE UP (ref 610–1660)
IGM SERPL-MCNC: 162 MG/DL — SIGNIFICANT CHANGE UP (ref 35–242)
IMM GRANULOCYTES NFR BLD AUTO: 0.2 % — SIGNIFICANT CHANGE UP (ref 0.1–0.3)
KAPPA LC SER QL IFE: 8.73 MG/DL — HIGH (ref 0.33–1.94)
KAPPA/LAMBDA FREE LIGHT CHAIN RATIO, SERUM: 1.64 RATIO — SIGNIFICANT CHANGE UP (ref 0.26–1.65)
LAMBDA LC SER QL IFE: 5.31 MG/DL — HIGH (ref 0.57–2.63)
LYMPHOCYTES # BLD AUTO: 0.79 K/UL — LOW (ref 1.2–3.4)
LYMPHOCYTES # BLD AUTO: 19.4 % — LOW (ref 20.5–51.1)
MAGNESIUM SERPL-MCNC: 1.6 MG/DL — LOW (ref 1.8–2.4)
MCHC RBC-ENTMCNC: 31.7 PG — HIGH (ref 27–31)
MCHC RBC-ENTMCNC: 33.6 G/DL — SIGNIFICANT CHANGE UP (ref 32–37)
MCV RBC AUTO: 94.3 FL — SIGNIFICANT CHANGE UP (ref 81–99)
MONOCYTES # BLD AUTO: 0.62 K/UL — HIGH (ref 0.1–0.6)
MONOCYTES NFR BLD AUTO: 15.2 % — HIGH (ref 1.7–9.3)
NEUTROPHILS # BLD AUTO: 2.41 K/UL — SIGNIFICANT CHANGE UP (ref 1.4–6.5)
NEUTROPHILS NFR BLD AUTO: 59.3 % — SIGNIFICANT CHANGE UP (ref 42.2–75.2)
NRBC # BLD: 0 /100 WBCS — SIGNIFICANT CHANGE UP (ref 0–0)
PLATELET # BLD AUTO: 152 K/UL — SIGNIFICANT CHANGE UP (ref 130–400)
PMV BLD: 10 FL — SIGNIFICANT CHANGE UP (ref 7.4–10.4)
POTASSIUM SERPL-MCNC: 5.2 MMOL/L — HIGH (ref 3.5–5)
POTASSIUM SERPL-SCNC: 5.2 MMOL/L — HIGH (ref 3.5–5)
PROT ?TM UR-MCNC: 106 MG/DL — HIGH (ref 0–12)
PROT SERPL-MCNC: 4.4 G/DL — LOW (ref 6–8)
RBC # BLD: 2.46 M/UL — LOW (ref 4.2–5.4)
RBC # FLD: 14.1 % — SIGNIFICANT CHANGE UP (ref 11.5–14.5)
SODIUM SERPL-SCNC: 123 MMOL/L — LOW (ref 135–146)
WBC # BLD: 4.07 K/UL — LOW (ref 4.8–10.8)
WBC # FLD AUTO: 4.07 K/UL — LOW (ref 4.8–10.8)

## 2024-06-11 PROCEDURE — 99232 SBSQ HOSP IP/OBS MODERATE 35: CPT

## 2024-06-11 PROCEDURE — 93971 EXTREMITY STUDY: CPT | Mod: 26,RT

## 2024-06-11 RX ORDER — MAGNESIUM SULFATE 100 %
2 POWDER (GRAM) MISCELLANEOUS ONCE
Refills: 0 | Status: COMPLETED | OUTPATIENT
Start: 2024-06-11 | End: 2024-06-11

## 2024-06-11 RX ADMIN — BUMETANIDE 2 MILLIGRAM(S): 0.25 INJECTION INTRAMUSCULAR; INTRAVENOUS at 06:00

## 2024-06-11 RX ADMIN — Medication 80 MILLIGRAM(S): at 06:00

## 2024-06-11 RX ADMIN — LABETALOL HYDROCHLORIDE 300 MILLIGRAM(S): 300 TABLET ORAL at 16:13

## 2024-06-11 RX ADMIN — Medication 80 MILLIGRAM(S): at 17:34

## 2024-06-11 RX ADMIN — ASPIRIN 81 MILLIGRAM(S): 325 TABLET, FILM COATED ORAL at 11:20

## 2024-06-11 RX ADMIN — CALCITRIOL 0.25 MICROGRAM(S): 0.25 CAPSULE, LIQUID FILLED ORAL at 11:20

## 2024-06-11 RX ADMIN — HEPARIN SODIUM 5000 UNIT(S): 50 INJECTION, SOLUTION INTRAVENOUS at 14:59

## 2024-06-11 RX ADMIN — ISOSORBIDE DINITRATE 5 MILLIGRAM(S): 5 TABLET ORAL at 17:33

## 2024-06-11 RX ADMIN — Medication 1 TABLET(S): at 05:59

## 2024-06-11 RX ADMIN — Medication 1000 UNIT(S): at 11:20

## 2024-06-11 RX ADMIN — Medication 15 GRAM(S): at 11:20

## 2024-06-11 RX ADMIN — ISOSORBIDE DINITRATE 5 MILLIGRAM(S): 5 TABLET ORAL at 06:00

## 2024-06-11 RX ADMIN — ISOSORBIDE DINITRATE 5 MILLIGRAM(S): 5 TABLET ORAL at 11:21

## 2024-06-11 RX ADMIN — PREDNISONE 10 MILLIGRAM(S): 10 TABLET ORAL at 05:59

## 2024-06-11 RX ADMIN — PANTOPRAZOLE SODIUM 40 MILLIGRAM(S): 40 INJECTION, POWDER, FOR SOLUTION INTRAVENOUS at 09:22

## 2024-06-11 RX ADMIN — LABETALOL HYDROCHLORIDE 300 MILLIGRAM(S): 300 TABLET ORAL at 21:48

## 2024-06-11 RX ADMIN — HYDRALAZINE HYDROCHLORIDE 75 MILLIGRAM(S): 50 TABLET ORAL at 21:48

## 2024-06-11 RX ADMIN — Medication 80 MILLIGRAM(S): at 11:21

## 2024-06-11 RX ADMIN — Medication 1 TABLET(S): at 17:34

## 2024-06-11 RX ADMIN — HYDRALAZINE HYDROCHLORIDE 75 MILLIGRAM(S): 50 TABLET ORAL at 06:00

## 2024-06-11 RX ADMIN — HEPARIN SODIUM 5000 UNIT(S): 50 INJECTION, SOLUTION INTRAVENOUS at 21:48

## 2024-06-11 RX ADMIN — HEPARIN SODIUM 5000 UNIT(S): 50 INJECTION, SOLUTION INTRAVENOUS at 05:59

## 2024-06-11 RX ADMIN — Medication 25 GRAM(S): at 14:59

## 2024-06-11 RX ADMIN — LABETALOL HYDROCHLORIDE 300 MILLIGRAM(S): 300 TABLET ORAL at 06:00

## 2024-06-11 RX ADMIN — HYDRALAZINE HYDROCHLORIDE 75 MILLIGRAM(S): 50 TABLET ORAL at 14:58

## 2024-06-11 RX ADMIN — Medication 1 APPLICATION(S): at 11:21

## 2024-06-12 LAB
% ALBUMIN: 53.9 % — SIGNIFICANT CHANGE UP
% ALPHA 1: 7.1 % — SIGNIFICANT CHANGE UP
% ALPHA 2: 13.7 % — SIGNIFICANT CHANGE UP
% BETA: 11.7 % — SIGNIFICANT CHANGE UP
% GAMMA, URINE: 5.4 % — SIGNIFICANT CHANGE UP
% GAMMA: 13.6 % — SIGNIFICANT CHANGE UP
% M SPIKE: SIGNIFICANT CHANGE UP
ALBUMIN 24H MFR UR ELPH: 71.3 % — SIGNIFICANT CHANGE UP
ALBUMIN SERPL ELPH-MCNC: 2.6 G/DL — LOW (ref 3.6–5.5)
ALBUMIN SERPL ELPH-MCNC: 2.9 G/DL — LOW (ref 3.5–5.2)
ALBUMIN/GLOB SERPL ELPH: 1.2 RATIO — SIGNIFICANT CHANGE UP
ALP SERPL-CCNC: 57 U/L — SIGNIFICANT CHANGE UP (ref 30–115)
ALPHA1 GLOB 24H MFR UR ELPH: 13 % — SIGNIFICANT CHANGE UP
ALPHA1 GLOB SERPL ELPH-MCNC: 0.3 G/DL — SIGNIFICANT CHANGE UP (ref 0.1–0.4)
ALPHA2 GLOB 24H MFR UR ELPH: 5.2 % — SIGNIFICANT CHANGE UP
ALPHA2 GLOB SERPL ELPH-MCNC: 0.7 G/DL — SIGNIFICANT CHANGE UP (ref 0.5–1)
ALT FLD-CCNC: 11 U/L — SIGNIFICANT CHANGE UP (ref 0–41)
ANION GAP SERPL CALC-SCNC: 8 MMOL/L — SIGNIFICANT CHANGE UP (ref 7–14)
APPEARANCE UR: CLEAR — SIGNIFICANT CHANGE UP
AST SERPL-CCNC: 19 U/L — SIGNIFICANT CHANGE UP (ref 0–41)
B-GLOBULIN 24H MFR UR ELPH: 5.1 % — SIGNIFICANT CHANGE UP
B-GLOBULIN SERPL ELPH-MCNC: 0.6 G/DL — SIGNIFICANT CHANGE UP (ref 0.5–1)
BACTERIA # UR AUTO: NEGATIVE /HPF — SIGNIFICANT CHANGE UP
BASOPHILS # BLD AUTO: 0.01 K/UL — SIGNIFICANT CHANGE UP (ref 0–0.2)
BASOPHILS NFR BLD AUTO: 0.3 % — SIGNIFICANT CHANGE UP (ref 0–1)
BILIRUB SERPL-MCNC: 0.3 MG/DL — SIGNIFICANT CHANGE UP (ref 0.2–1.2)
BILIRUB UR-MCNC: NEGATIVE — SIGNIFICANT CHANGE UP
BUN SERPL-MCNC: 82 MG/DL — CRITICAL HIGH (ref 10–20)
CALCIUM SERPL-MCNC: 9.4 MG/DL — SIGNIFICANT CHANGE UP (ref 8.4–10.4)
CAST: 0 /LPF — SIGNIFICANT CHANGE UP (ref 0–4)
CHLORIDE SERPL-SCNC: 87 MMOL/L — LOW (ref 98–110)
CO2 SERPL-SCNC: 25 MMOL/L — SIGNIFICANT CHANGE UP (ref 17–32)
COLOR SPEC: YELLOW — SIGNIFICANT CHANGE UP
CREAT SERPL-MCNC: 2.5 MG/DL — HIGH (ref 0.7–1.5)
DIFF PNL FLD: NEGATIVE — SIGNIFICANT CHANGE UP
EGFR: 20 ML/MIN/1.73M2 — LOW
EOSINOPHIL # BLD AUTO: 0.06 K/UL — SIGNIFICANT CHANGE UP (ref 0–0.7)
EOSINOPHIL NFR BLD AUTO: 1.8 % — SIGNIFICANT CHANGE UP (ref 0–8)
GAMMA GLOBULIN: 0.7 G/DL — SIGNIFICANT CHANGE UP (ref 0.6–1.6)
GLUCOSE SERPL-MCNC: 110 MG/DL — HIGH (ref 70–99)
GLUCOSE UR QL: NEGATIVE MG/DL — SIGNIFICANT CHANGE UP
HCT VFR BLD CALC: 25.1 % — LOW (ref 37–47)
HGB BLD-MCNC: 8.2 G/DL — LOW (ref 12–16)
IMM GRANULOCYTES NFR BLD AUTO: 0.6 % — HIGH (ref 0.1–0.3)
INTERPRETATION 24H UR IFE-IMP: SIGNIFICANT CHANGE UP
INTERPRETATION SERPL IFE-IMP: SIGNIFICANT CHANGE UP
KETONES UR-MCNC: NEGATIVE MG/DL — SIGNIFICANT CHANGE UP
LEUKOCYTE ESTERASE UR-ACNC: NEGATIVE — SIGNIFICANT CHANGE UP
LYMPHOCYTES # BLD AUTO: 0.35 K/UL — LOW (ref 1.2–3.4)
LYMPHOCYTES # BLD AUTO: 10.5 % — LOW (ref 20.5–51.1)
M PROTEIN 24H UR ELPH-MRATE: SIGNIFICANT CHANGE UP
M-SPIKE: SIGNIFICANT CHANGE UP (ref 0–0)
MAGNESIUM SERPL-MCNC: 2.2 MG/DL — SIGNIFICANT CHANGE UP (ref 1.8–2.4)
MCHC RBC-ENTMCNC: 30.6 PG — SIGNIFICANT CHANGE UP (ref 27–31)
MCHC RBC-ENTMCNC: 32.7 G/DL — SIGNIFICANT CHANGE UP (ref 32–37)
MCV RBC AUTO: 93.7 FL — SIGNIFICANT CHANGE UP (ref 81–99)
MONOCYTES # BLD AUTO: 0.18 K/UL — SIGNIFICANT CHANGE UP (ref 0.1–0.6)
MONOCYTES NFR BLD AUTO: 5.4 % — SIGNIFICANT CHANGE UP (ref 1.7–9.3)
NEUTROPHILS # BLD AUTO: 2.7 K/UL — SIGNIFICANT CHANGE UP (ref 1.4–6.5)
NEUTROPHILS NFR BLD AUTO: 81.4 % — HIGH (ref 42.2–75.2)
NITRITE UR-MCNC: NEGATIVE — SIGNIFICANT CHANGE UP
NRBC # BLD: 0 /100 WBCS — SIGNIFICANT CHANGE UP (ref 0–0)
OSMOLALITY SERPL: 290 MOS/KG — SIGNIFICANT CHANGE UP (ref 280–301)
OSMOLALITY UR: 282 MOS/KG — SIGNIFICANT CHANGE UP (ref 50–1200)
PH UR: 6.5 — SIGNIFICANT CHANGE UP (ref 5–8)
PLATELET # BLD AUTO: 148 K/UL — SIGNIFICANT CHANGE UP (ref 130–400)
PMV BLD: 9.6 FL — SIGNIFICANT CHANGE UP (ref 7.4–10.4)
POTASSIUM SERPL-MCNC: 5.3 MMOL/L — HIGH (ref 3.5–5)
POTASSIUM SERPL-SCNC: 5.3 MMOL/L — HIGH (ref 3.5–5)
PROT ?TM UR-MCNC: 106 MG/DL — HIGH (ref 0–12)
PROT PATTERN 24H UR ELPH-IMP: SIGNIFICANT CHANGE UP
PROT PATTERN SERPL ELPH-IMP: SIGNIFICANT CHANGE UP
PROT SERPL-MCNC: 4.6 G/DL — LOW (ref 6–8)
PROT SERPL-MCNC: 4.8 G/DL — LOW (ref 6–8.3)
PROT UR-MCNC: 30 MG/DL
RBC # BLD: 2.68 M/UL — LOW (ref 4.2–5.4)
RBC # FLD: 14.1 % — SIGNIFICANT CHANGE UP (ref 11.5–14.5)
RBC CASTS # UR COMP ASSIST: 1 /HPF — SIGNIFICANT CHANGE UP (ref 0–4)
SODIUM SERPL-SCNC: 120 MMOL/L — LOW (ref 135–146)
SODIUM UR-SCNC: 75 MMOL/L — SIGNIFICANT CHANGE UP
SP GR SPEC: 1.01 — SIGNIFICANT CHANGE UP (ref 1–1.03)
SQUAMOUS # UR AUTO: 0 /HPF — SIGNIFICANT CHANGE UP (ref 0–5)
TOTAL VOLUME - 24 HOUR: SIGNIFICANT CHANGE UP ML
URINE CREATININE CALCULATION: SIGNIFICANT CHANGE UP G/24 H (ref 0.8–1.8)
UROBILINOGEN FLD QL: 0.2 MG/DL — SIGNIFICANT CHANGE UP (ref 0.2–1)
WBC # BLD: 3.32 K/UL — LOW (ref 4.8–10.8)
WBC # FLD AUTO: 3.32 K/UL — LOW (ref 4.8–10.8)
WBC UR QL: 0 /HPF — SIGNIFICANT CHANGE UP (ref 0–5)

## 2024-06-12 PROCEDURE — 93971 EXTREMITY STUDY: CPT | Mod: 26,LT

## 2024-06-12 PROCEDURE — 99233 SBSQ HOSP IP/OBS HIGH 50: CPT

## 2024-06-12 RX ORDER — CALCIUM CARBONATE 500(1250)
1 TABLET,CHEWABLE ORAL DAILY
Refills: 0 | Status: DISCONTINUED | OUTPATIENT
Start: 2024-06-12 | End: 2024-06-17

## 2024-06-12 RX ORDER — SODIUM CHLORIDE 0.9 % (FLUSH) 0.9 %
1000 SYRINGE (ML) INJECTION
Refills: 0 | Status: DISCONTINUED | OUTPATIENT
Start: 2024-06-12 | End: 2024-06-13

## 2024-06-12 RX ADMIN — HEPARIN SODIUM 5000 UNIT(S): 50 INJECTION, SOLUTION INTRAVENOUS at 06:13

## 2024-06-12 RX ADMIN — LABETALOL HYDROCHLORIDE 300 MILLIGRAM(S): 300 TABLET ORAL at 14:43

## 2024-06-12 RX ADMIN — Medication 30 MILLILITER(S): at 06:11

## 2024-06-12 RX ADMIN — Medication 1 TABLET(S): at 17:37

## 2024-06-12 RX ADMIN — BUMETANIDE 2 MILLIGRAM(S): 0.25 INJECTION INTRAMUSCULAR; INTRAVENOUS at 06:13

## 2024-06-12 RX ADMIN — Medication 80 MILLIGRAM(S): at 00:06

## 2024-06-12 RX ADMIN — ISOSORBIDE DINITRATE 5 MILLIGRAM(S): 5 TABLET ORAL at 06:14

## 2024-06-12 RX ADMIN — Medication 50 MILLILITER(S): at 14:43

## 2024-06-12 RX ADMIN — HYDRALAZINE HYDROCHLORIDE 75 MILLIGRAM(S): 50 TABLET ORAL at 06:13

## 2024-06-12 RX ADMIN — HEPARIN SODIUM 5000 UNIT(S): 50 INJECTION, SOLUTION INTRAVENOUS at 14:44

## 2024-06-12 RX ADMIN — Medication 80 MILLIGRAM(S): at 17:37

## 2024-06-12 RX ADMIN — Medication 1 APPLICATION(S): at 12:21

## 2024-06-12 RX ADMIN — CALCITRIOL 0.25 MICROGRAM(S): 0.25 CAPSULE, LIQUID FILLED ORAL at 12:17

## 2024-06-12 RX ADMIN — Medication 80 MILLIGRAM(S): at 12:17

## 2024-06-12 RX ADMIN — ISOSORBIDE DINITRATE 5 MILLIGRAM(S): 5 TABLET ORAL at 12:18

## 2024-06-12 RX ADMIN — Medication 1 TABLET(S): at 06:12

## 2024-06-12 RX ADMIN — Medication 80 MILLIGRAM(S): at 06:12

## 2024-06-12 RX ADMIN — HEPARIN SODIUM 5000 UNIT(S): 50 INJECTION, SOLUTION INTRAVENOUS at 21:30

## 2024-06-12 RX ADMIN — ASPIRIN 81 MILLIGRAM(S): 325 TABLET, FILM COATED ORAL at 12:18

## 2024-06-12 RX ADMIN — PANTOPRAZOLE SODIUM 40 MILLIGRAM(S): 40 INJECTION, POWDER, FOR SOLUTION INTRAVENOUS at 08:37

## 2024-06-12 RX ADMIN — ISOSORBIDE DINITRATE 5 MILLIGRAM(S): 5 TABLET ORAL at 17:37

## 2024-06-12 RX ADMIN — HYDRALAZINE HYDROCHLORIDE 75 MILLIGRAM(S): 50 TABLET ORAL at 14:44

## 2024-06-12 RX ADMIN — LABETALOL HYDROCHLORIDE 300 MILLIGRAM(S): 300 TABLET ORAL at 06:14

## 2024-06-12 RX ADMIN — HYDRALAZINE HYDROCHLORIDE 75 MILLIGRAM(S): 50 TABLET ORAL at 21:30

## 2024-06-12 RX ADMIN — PREDNISONE 10 MILLIGRAM(S): 10 TABLET ORAL at 06:13

## 2024-06-12 RX ADMIN — Medication 1000 UNIT(S): at 12:18

## 2024-06-12 RX ADMIN — LABETALOL HYDROCHLORIDE 300 MILLIGRAM(S): 300 TABLET ORAL at 21:30

## 2024-06-13 LAB
ANION GAP SERPL CALC-SCNC: 10 MMOL/L — SIGNIFICANT CHANGE UP (ref 7–14)
ANION GAP SERPL CALC-SCNC: 7 MMOL/L — SIGNIFICANT CHANGE UP (ref 7–14)
ANION GAP SERPL CALC-SCNC: 9 MMOL/L — SIGNIFICANT CHANGE UP (ref 7–14)
APPEARANCE UR: ABNORMAL
BACTERIA # UR AUTO: ABNORMAL /HPF
BASOPHILS # BLD AUTO: 0.02 K/UL — SIGNIFICANT CHANGE UP (ref 0–0.2)
BASOPHILS NFR BLD AUTO: 0.6 % — SIGNIFICANT CHANGE UP (ref 0–1)
BILIRUB UR-MCNC: NEGATIVE — SIGNIFICANT CHANGE UP
BUN SERPL-MCNC: 76 MG/DL — CRITICAL HIGH (ref 10–20)
BUN SERPL-MCNC: 76 MG/DL — CRITICAL HIGH (ref 10–20)
BUN SERPL-MCNC: 81 MG/DL — CRITICAL HIGH (ref 10–20)
CALCIUM SERPL-MCNC: 9 MG/DL — SIGNIFICANT CHANGE UP (ref 8.4–10.5)
CALCIUM SERPL-MCNC: 9.2 MG/DL — SIGNIFICANT CHANGE UP (ref 8.4–10.4)
CALCIUM SERPL-MCNC: 9.5 MG/DL — SIGNIFICANT CHANGE UP (ref 8.4–10.5)
CAST: 1 /LPF — SIGNIFICANT CHANGE UP (ref 0–4)
CHLORIDE SERPL-SCNC: 86 MMOL/L — LOW (ref 98–110)
CHLORIDE SERPL-SCNC: 88 MMOL/L — LOW (ref 98–110)
CHLORIDE SERPL-SCNC: 88 MMOL/L — LOW (ref 98–110)
CO2 SERPL-SCNC: 25 MMOL/L — SIGNIFICANT CHANGE UP (ref 17–32)
COLOR SPEC: YELLOW — SIGNIFICANT CHANGE UP
CREAT SERPL-MCNC: 2.5 MG/DL — HIGH (ref 0.7–1.5)
DIFF PNL FLD: NEGATIVE — SIGNIFICANT CHANGE UP
EGFR: 20 ML/MIN/1.73M2 — LOW
EOSINOPHIL # BLD AUTO: 0.2 K/UL — SIGNIFICANT CHANGE UP (ref 0–0.7)
EOSINOPHIL NFR BLD AUTO: 5.7 % — SIGNIFICANT CHANGE UP (ref 0–8)
GLUCOSE SERPL-MCNC: 104 MG/DL — HIGH (ref 70–99)
GLUCOSE SERPL-MCNC: 124 MG/DL — HIGH (ref 70–99)
GLUCOSE SERPL-MCNC: 90 MG/DL — SIGNIFICANT CHANGE UP (ref 70–99)
GLUCOSE UR QL: NEGATIVE MG/DL — SIGNIFICANT CHANGE UP
HCT VFR BLD CALC: 22.4 % — LOW (ref 37–47)
HGB BLD-MCNC: 7.7 G/DL — LOW (ref 12–16)
IMM GRANULOCYTES NFR BLD AUTO: 0.3 % — SIGNIFICANT CHANGE UP (ref 0.1–0.3)
KETONES UR-MCNC: NEGATIVE MG/DL — SIGNIFICANT CHANGE UP
LEUKOCYTE ESTERASE UR-ACNC: NEGATIVE — SIGNIFICANT CHANGE UP
LYMPHOCYTES # BLD AUTO: 0.8 K/UL — LOW (ref 1.2–3.4)
LYMPHOCYTES # BLD AUTO: 22.9 % — SIGNIFICANT CHANGE UP (ref 20.5–51.1)
MAGNESIUM SERPL-MCNC: 2 MG/DL — SIGNIFICANT CHANGE UP (ref 1.8–2.4)
MCHC RBC-ENTMCNC: 32 PG — HIGH (ref 27–31)
MCHC RBC-ENTMCNC: 34.4 G/DL — SIGNIFICANT CHANGE UP (ref 32–37)
MCV RBC AUTO: 92.9 FL — SIGNIFICANT CHANGE UP (ref 81–99)
MONOCYTES # BLD AUTO: 0.48 K/UL — SIGNIFICANT CHANGE UP (ref 0.1–0.6)
MONOCYTES NFR BLD AUTO: 13.8 % — HIGH (ref 1.7–9.3)
NEUTROPHILS # BLD AUTO: 1.98 K/UL — SIGNIFICANT CHANGE UP (ref 1.4–6.5)
NEUTROPHILS NFR BLD AUTO: 56.7 % — SIGNIFICANT CHANGE UP (ref 42.2–75.2)
NITRITE UR-MCNC: NEGATIVE — SIGNIFICANT CHANGE UP
NRBC # BLD: 0 /100 WBCS — SIGNIFICANT CHANGE UP (ref 0–0)
OSMOLALITY UR: 325 MOS/KG — SIGNIFICANT CHANGE UP (ref 50–1200)
PH UR: 7.5 — SIGNIFICANT CHANGE UP (ref 5–8)
PLATELET # BLD AUTO: 153 K/UL — SIGNIFICANT CHANGE UP (ref 130–400)
PMV BLD: 9.6 FL — SIGNIFICANT CHANGE UP (ref 7.4–10.4)
POTASSIUM SERPL-MCNC: 5.1 MMOL/L — HIGH (ref 3.5–5)
POTASSIUM SERPL-MCNC: 5.2 MMOL/L — HIGH (ref 3.5–5)
POTASSIUM SERPL-MCNC: 5.3 MMOL/L — HIGH (ref 3.5–5)
POTASSIUM SERPL-SCNC: 5.1 MMOL/L — HIGH (ref 3.5–5)
POTASSIUM SERPL-SCNC: 5.2 MMOL/L — HIGH (ref 3.5–5)
POTASSIUM SERPL-SCNC: 5.3 MMOL/L — HIGH (ref 3.5–5)
POTASSIUM UR-SCNC: 24 MMOL/L — SIGNIFICANT CHANGE UP
PROT UR-MCNC: 30 MG/DL
RBC # BLD: 2.41 M/UL — LOW (ref 4.2–5.4)
RBC # FLD: 14 % — SIGNIFICANT CHANGE UP (ref 11.5–14.5)
RBC CASTS # UR COMP ASSIST: 1 /HPF — SIGNIFICANT CHANGE UP (ref 0–4)
SODIUM SERPL-SCNC: 120 MMOL/L — LOW (ref 135–146)
SODIUM SERPL-SCNC: 120 MMOL/L — LOW (ref 135–146)
SODIUM SERPL-SCNC: 123 MMOL/L — LOW (ref 135–146)
SODIUM UR-SCNC: 61 MMOL/L — SIGNIFICANT CHANGE UP
SP GR SPEC: 1.01 — SIGNIFICANT CHANGE UP (ref 1–1.03)
SQUAMOUS # UR AUTO: 0 /HPF — SIGNIFICANT CHANGE UP (ref 0–5)
UROBILINOGEN FLD QL: 0.2 MG/DL — SIGNIFICANT CHANGE UP (ref 0.2–1)
WBC # BLD: 3.49 K/UL — LOW (ref 4.8–10.8)
WBC # FLD AUTO: 3.49 K/UL — LOW (ref 4.8–10.8)
WBC UR QL: 0 /HPF — SIGNIFICANT CHANGE UP (ref 0–5)

## 2024-06-13 PROCEDURE — 99233 SBSQ HOSP IP/OBS HIGH 50: CPT

## 2024-06-13 RX ORDER — TOLVAPTAN 30 MG/1
15 TABLET ORAL ONCE
Refills: 0 | Status: COMPLETED | OUTPATIENT
Start: 2024-06-14 | End: 2024-06-14

## 2024-06-13 RX ADMIN — PREDNISONE 10 MILLIGRAM(S): 10 TABLET ORAL at 05:35

## 2024-06-13 RX ADMIN — HEPARIN SODIUM 5000 UNIT(S): 50 INJECTION, SOLUTION INTRAVENOUS at 15:04

## 2024-06-13 RX ADMIN — Medication 80 MILLIGRAM(S): at 12:27

## 2024-06-13 RX ADMIN — Medication 80 MILLIGRAM(S): at 05:35

## 2024-06-13 RX ADMIN — LABETALOL HYDROCHLORIDE 300 MILLIGRAM(S): 300 TABLET ORAL at 05:35

## 2024-06-13 RX ADMIN — ISOSORBIDE DINITRATE 5 MILLIGRAM(S): 5 TABLET ORAL at 17:23

## 2024-06-13 RX ADMIN — Medication 1 TABLET(S): at 12:27

## 2024-06-13 RX ADMIN — HEPARIN SODIUM 5000 UNIT(S): 50 INJECTION, SOLUTION INTRAVENOUS at 21:40

## 2024-06-13 RX ADMIN — PANTOPRAZOLE SODIUM 40 MILLIGRAM(S): 40 INJECTION, POWDER, FOR SOLUTION INTRAVENOUS at 07:40

## 2024-06-13 RX ADMIN — Medication 650 MILLIGRAM(S): at 00:24

## 2024-06-13 RX ADMIN — HEPARIN SODIUM 5000 UNIT(S): 50 INJECTION, SOLUTION INTRAVENOUS at 05:34

## 2024-06-13 RX ADMIN — Medication 80 MILLIGRAM(S): at 17:23

## 2024-06-13 RX ADMIN — ASPIRIN 81 MILLIGRAM(S): 325 TABLET, FILM COATED ORAL at 12:27

## 2024-06-13 RX ADMIN — LABETALOL HYDROCHLORIDE 300 MILLIGRAM(S): 300 TABLET ORAL at 21:39

## 2024-06-13 RX ADMIN — CALCITRIOL 0.25 MICROGRAM(S): 0.25 CAPSULE, LIQUID FILLED ORAL at 12:27

## 2024-06-13 RX ADMIN — LABETALOL HYDROCHLORIDE 300 MILLIGRAM(S): 300 TABLET ORAL at 15:04

## 2024-06-13 RX ADMIN — Medication 80 MILLIGRAM(S): at 00:16

## 2024-06-13 RX ADMIN — Medication 1000 UNIT(S): at 12:27

## 2024-06-13 RX ADMIN — HYDRALAZINE HYDROCHLORIDE 75 MILLIGRAM(S): 50 TABLET ORAL at 15:04

## 2024-06-13 RX ADMIN — Medication 1 APPLICATION(S): at 12:28

## 2024-06-13 RX ADMIN — ISOSORBIDE DINITRATE 5 MILLIGRAM(S): 5 TABLET ORAL at 12:27

## 2024-06-13 RX ADMIN — HYDRALAZINE HYDROCHLORIDE 75 MILLIGRAM(S): 50 TABLET ORAL at 05:35

## 2024-06-13 RX ADMIN — HYDRALAZINE HYDROCHLORIDE 75 MILLIGRAM(S): 50 TABLET ORAL at 21:40

## 2024-06-13 RX ADMIN — ISOSORBIDE DINITRATE 5 MILLIGRAM(S): 5 TABLET ORAL at 05:35

## 2024-06-14 LAB
ANION GAP SERPL CALC-SCNC: 8 MMOL/L — SIGNIFICANT CHANGE UP (ref 7–14)
BUN SERPL-MCNC: 81 MG/DL — CRITICAL HIGH (ref 10–20)
CALCIUM SERPL-MCNC: 8.9 MG/DL — SIGNIFICANT CHANGE UP (ref 8.4–10.5)
CHLORIDE SERPL-SCNC: 88 MMOL/L — LOW (ref 98–110)
CO2 SERPL-SCNC: 24 MMOL/L — SIGNIFICANT CHANGE UP (ref 17–32)
CREAT SERPL-MCNC: 2.7 MG/DL — HIGH (ref 0.7–1.5)
EGFR: 18 ML/MIN/1.73M2 — LOW
GLUCOSE SERPL-MCNC: 88 MG/DL — SIGNIFICANT CHANGE UP (ref 70–99)
HCT VFR BLD CALC: 21.6 % — LOW (ref 37–47)
HGB BLD-MCNC: 7.3 G/DL — LOW (ref 12–16)
MCHC RBC-ENTMCNC: 31.5 PG — HIGH (ref 27–31)
MCHC RBC-ENTMCNC: 33.8 G/DL — SIGNIFICANT CHANGE UP (ref 32–37)
MCV RBC AUTO: 93.1 FL — SIGNIFICANT CHANGE UP (ref 81–99)
NRBC # BLD: 0 /100 WBCS — SIGNIFICANT CHANGE UP (ref 0–0)
OSMOLALITY SERPL: 281 MOS/KG — SIGNIFICANT CHANGE UP (ref 280–301)
PLATELET # BLD AUTO: 154 K/UL — SIGNIFICANT CHANGE UP (ref 130–400)
PMV BLD: 9.7 FL — SIGNIFICANT CHANGE UP (ref 7.4–10.4)
POTASSIUM SERPL-MCNC: 5.3 MMOL/L — HIGH (ref 3.5–5)
POTASSIUM SERPL-SCNC: 5.3 MMOL/L — HIGH (ref 3.5–5)
RBC # BLD: 2.32 M/UL — LOW (ref 4.2–5.4)
RBC # FLD: 14.1 % — SIGNIFICANT CHANGE UP (ref 11.5–14.5)
SODIUM SERPL-SCNC: 120 MMOL/L — LOW (ref 135–146)
WBC # BLD: 3.71 K/UL — LOW (ref 4.8–10.8)
WBC # FLD AUTO: 3.71 K/UL — LOW (ref 4.8–10.8)

## 2024-06-14 PROCEDURE — 99233 SBSQ HOSP IP/OBS HIGH 50: CPT

## 2024-06-14 PROCEDURE — 74018 RADEX ABDOMEN 1 VIEW: CPT | Mod: 26

## 2024-06-14 RX ORDER — ERYTHROPOIETIN 4000 [IU]/ML
10000 INJECTION, SOLUTION INTRAVENOUS; SUBCUTANEOUS
Refills: 0 | Status: DISCONTINUED | OUTPATIENT
Start: 2024-06-14 | End: 2024-06-17

## 2024-06-14 RX ADMIN — HEPARIN SODIUM 5000 UNIT(S): 50 INJECTION, SOLUTION INTRAVENOUS at 14:48

## 2024-06-14 RX ADMIN — Medication 1 APPLICATION(S): at 12:15

## 2024-06-14 RX ADMIN — LABETALOL HYDROCHLORIDE 300 MILLIGRAM(S): 300 TABLET ORAL at 21:51

## 2024-06-14 RX ADMIN — LABETALOL HYDROCHLORIDE 300 MILLIGRAM(S): 300 TABLET ORAL at 05:22

## 2024-06-14 RX ADMIN — ISOSORBIDE DINITRATE 5 MILLIGRAM(S): 5 TABLET ORAL at 17:45

## 2024-06-14 RX ADMIN — HYDRALAZINE HYDROCHLORIDE 75 MILLIGRAM(S): 50 TABLET ORAL at 21:51

## 2024-06-14 RX ADMIN — HYDRALAZINE HYDROCHLORIDE 75 MILLIGRAM(S): 50 TABLET ORAL at 14:48

## 2024-06-14 RX ADMIN — ASPIRIN 81 MILLIGRAM(S): 325 TABLET, FILM COATED ORAL at 12:14

## 2024-06-14 RX ADMIN — ISOSORBIDE DINITRATE 5 MILLIGRAM(S): 5 TABLET ORAL at 05:23

## 2024-06-14 RX ADMIN — ISOSORBIDE DINITRATE 5 MILLIGRAM(S): 5 TABLET ORAL at 12:14

## 2024-06-14 RX ADMIN — TOLVAPTAN 15 MILLIGRAM(S): 30 TABLET ORAL at 17:45

## 2024-06-14 RX ADMIN — Medication 80 MILLIGRAM(S): at 12:14

## 2024-06-14 RX ADMIN — HYDRALAZINE HYDROCHLORIDE 75 MILLIGRAM(S): 50 TABLET ORAL at 05:22

## 2024-06-14 RX ADMIN — Medication 1 TABLET(S): at 12:14

## 2024-06-14 RX ADMIN — HEPARIN SODIUM 5000 UNIT(S): 50 INJECTION, SOLUTION INTRAVENOUS at 05:22

## 2024-06-14 RX ADMIN — PREDNISONE 10 MILLIGRAM(S): 10 TABLET ORAL at 05:22

## 2024-06-14 RX ADMIN — Medication 80 MILLIGRAM(S): at 00:00

## 2024-06-14 RX ADMIN — CALCITRIOL 0.25 MICROGRAM(S): 0.25 CAPSULE, LIQUID FILLED ORAL at 12:14

## 2024-06-14 RX ADMIN — Medication 80 MILLIGRAM(S): at 23:48

## 2024-06-14 RX ADMIN — Medication 80 MILLIGRAM(S): at 17:45

## 2024-06-14 RX ADMIN — HEPARIN SODIUM 5000 UNIT(S): 50 INJECTION, SOLUTION INTRAVENOUS at 21:51

## 2024-06-14 RX ADMIN — LABETALOL HYDROCHLORIDE 300 MILLIGRAM(S): 300 TABLET ORAL at 14:48

## 2024-06-14 RX ADMIN — Medication 1000 UNIT(S): at 12:14

## 2024-06-14 RX ADMIN — Medication 3 MILLIGRAM(S): at 00:45

## 2024-06-14 RX ADMIN — PANTOPRAZOLE SODIUM 40 MILLIGRAM(S): 40 INJECTION, POWDER, FOR SOLUTION INTRAVENOUS at 05:23

## 2024-06-14 RX ADMIN — Medication 80 MILLIGRAM(S): at 05:21

## 2024-06-15 LAB
ANION GAP SERPL CALC-SCNC: 6 MMOL/L — LOW (ref 7–14)
ANION GAP SERPL CALC-SCNC: 7 MMOL/L — SIGNIFICANT CHANGE UP (ref 7–14)
BUN SERPL-MCNC: 80 MG/DL — CRITICAL HIGH (ref 10–20)
BUN SERPL-MCNC: 80 MG/DL — CRITICAL HIGH (ref 10–20)
CALCIUM SERPL-MCNC: 8.4 MG/DL — SIGNIFICANT CHANGE UP (ref 8.4–10.4)
CALCIUM SERPL-MCNC: 8.5 MG/DL — SIGNIFICANT CHANGE UP (ref 8.4–10.4)
CHLORIDE SERPL-SCNC: 90 MMOL/L — LOW (ref 98–110)
CHLORIDE SERPL-SCNC: 90 MMOL/L — LOW (ref 98–110)
CO2 SERPL-SCNC: 24 MMOL/L — SIGNIFICANT CHANGE UP (ref 17–32)
CO2 SERPL-SCNC: 25 MMOL/L — SIGNIFICANT CHANGE UP (ref 17–32)
CREAT SERPL-MCNC: 2.7 MG/DL — HIGH (ref 0.7–1.5)
CREAT SERPL-MCNC: 2.8 MG/DL — HIGH (ref 0.7–1.5)
EGFR: 18 ML/MIN/1.73M2 — LOW
EGFR: 18 ML/MIN/1.73M2 — LOW
GLUCOSE SERPL-MCNC: 116 MG/DL — HIGH (ref 70–99)
GLUCOSE SERPL-MCNC: 124 MG/DL — HIGH (ref 70–99)
HCT VFR BLD CALC: 20.8 % — LOW (ref 37–47)
HGB BLD-MCNC: 7.1 G/DL — LOW (ref 12–16)
MCHC RBC-ENTMCNC: 31.8 PG — HIGH (ref 27–31)
MCHC RBC-ENTMCNC: 34.1 G/DL — SIGNIFICANT CHANGE UP (ref 32–37)
MCV RBC AUTO: 93.3 FL — SIGNIFICANT CHANGE UP (ref 81–99)
NRBC # BLD: 0 /100 WBCS — SIGNIFICANT CHANGE UP (ref 0–0)
PLATELET # BLD AUTO: 154 K/UL — SIGNIFICANT CHANGE UP (ref 130–400)
PMV BLD: 9.7 FL — SIGNIFICANT CHANGE UP (ref 7.4–10.4)
POTASSIUM SERPL-MCNC: 5.1 MMOL/L — HIGH (ref 3.5–5)
POTASSIUM SERPL-MCNC: 5.6 MMOL/L — HIGH (ref 3.5–5)
POTASSIUM SERPL-SCNC: 5.1 MMOL/L — HIGH (ref 3.5–5)
POTASSIUM SERPL-SCNC: 5.6 MMOL/L — HIGH (ref 3.5–5)
RBC # BLD: 2.23 M/UL — LOW (ref 4.2–5.4)
RBC # FLD: 13.9 % — SIGNIFICANT CHANGE UP (ref 11.5–14.5)
SODIUM SERPL-SCNC: 121 MMOL/L — LOW (ref 135–146)
SODIUM SERPL-SCNC: 121 MMOL/L — LOW (ref 135–146)
WBC # BLD: 4.58 K/UL — LOW (ref 4.8–10.8)
WBC # FLD AUTO: 4.58 K/UL — LOW (ref 4.8–10.8)

## 2024-06-15 PROCEDURE — 99233 SBSQ HOSP IP/OBS HIGH 50: CPT

## 2024-06-15 RX ORDER — SODIUM ZIRCONIUM CYCLOSILICATE 10 G/10G
10 POWDER, FOR SUSPENSION ORAL DAILY
Refills: 0 | Status: DISCONTINUED | OUTPATIENT
Start: 2024-06-15 | End: 2024-06-16

## 2024-06-15 RX ORDER — TOLVAPTAN 30 MG/1
15 TABLET ORAL ONCE
Refills: 0 | Status: COMPLETED | OUTPATIENT
Start: 2024-06-15 | End: 2024-06-15

## 2024-06-15 RX ADMIN — Medication 80 MILLIGRAM(S): at 18:03

## 2024-06-15 RX ADMIN — LABETALOL HYDROCHLORIDE 300 MILLIGRAM(S): 300 TABLET ORAL at 21:45

## 2024-06-15 RX ADMIN — TOLVAPTAN 15 MILLIGRAM(S): 30 TABLET ORAL at 14:06

## 2024-06-15 RX ADMIN — HEPARIN SODIUM 5000 UNIT(S): 50 INJECTION, SOLUTION INTRAVENOUS at 06:06

## 2024-06-15 RX ADMIN — ISOSORBIDE DINITRATE 5 MILLIGRAM(S): 5 TABLET ORAL at 14:03

## 2024-06-15 RX ADMIN — SODIUM ZIRCONIUM CYCLOSILICATE 10 GRAM(S): 10 POWDER, FOR SUSPENSION ORAL at 20:13

## 2024-06-15 RX ADMIN — HEPARIN SODIUM 5000 UNIT(S): 50 INJECTION, SOLUTION INTRAVENOUS at 14:03

## 2024-06-15 RX ADMIN — Medication 650 MILLIGRAM(S): at 06:08

## 2024-06-15 RX ADMIN — HEPARIN SODIUM 5000 UNIT(S): 50 INJECTION, SOLUTION INTRAVENOUS at 21:46

## 2024-06-15 RX ADMIN — ISOSORBIDE DINITRATE 5 MILLIGRAM(S): 5 TABLET ORAL at 18:05

## 2024-06-15 RX ADMIN — HYDRALAZINE HYDROCHLORIDE 75 MILLIGRAM(S): 50 TABLET ORAL at 14:05

## 2024-06-15 RX ADMIN — CALCITRIOL 0.25 MICROGRAM(S): 0.25 CAPSULE, LIQUID FILLED ORAL at 14:15

## 2024-06-15 RX ADMIN — Medication 80 MILLIGRAM(S): at 14:03

## 2024-06-15 RX ADMIN — PANTOPRAZOLE SODIUM 40 MILLIGRAM(S): 40 INJECTION, POWDER, FOR SOLUTION INTRAVENOUS at 06:05

## 2024-06-15 RX ADMIN — Medication 650 MILLIGRAM(S): at 22:49

## 2024-06-15 RX ADMIN — LABETALOL HYDROCHLORIDE 300 MILLIGRAM(S): 300 TABLET ORAL at 06:06

## 2024-06-15 RX ADMIN — Medication 80 MILLIGRAM(S): at 06:04

## 2024-06-15 RX ADMIN — LABETALOL HYDROCHLORIDE 300 MILLIGRAM(S): 300 TABLET ORAL at 14:04

## 2024-06-15 RX ADMIN — HYDRALAZINE HYDROCHLORIDE 75 MILLIGRAM(S): 50 TABLET ORAL at 06:05

## 2024-06-15 RX ADMIN — Medication 650 MILLIGRAM(S): at 21:46

## 2024-06-15 RX ADMIN — Medication 1 APPLICATION(S): at 14:07

## 2024-06-15 RX ADMIN — Medication 1000 UNIT(S): at 13:56

## 2024-06-15 RX ADMIN — ISOSORBIDE DINITRATE 5 MILLIGRAM(S): 5 TABLET ORAL at 06:06

## 2024-06-15 RX ADMIN — PREDNISONE 10 MILLIGRAM(S): 10 TABLET ORAL at 06:05

## 2024-06-15 RX ADMIN — ASPIRIN 81 MILLIGRAM(S): 325 TABLET, FILM COATED ORAL at 14:02

## 2024-06-15 RX ADMIN — HYDRALAZINE HYDROCHLORIDE 75 MILLIGRAM(S): 50 TABLET ORAL at 21:45

## 2024-06-15 RX ADMIN — Medication 1 TABLET(S): at 14:05

## 2024-06-16 LAB
ANION GAP SERPL CALC-SCNC: 10 MMOL/L — SIGNIFICANT CHANGE UP (ref 7–14)
ANION GAP SERPL CALC-SCNC: 9 MMOL/L — SIGNIFICANT CHANGE UP (ref 7–14)
BUN SERPL-MCNC: 78 MG/DL — CRITICAL HIGH (ref 10–20)
BUN SERPL-MCNC: 78 MG/DL — CRITICAL HIGH (ref 10–20)
CALCIUM SERPL-MCNC: 8.6 MG/DL — SIGNIFICANT CHANGE UP (ref 8.4–10.4)
CALCIUM SERPL-MCNC: 8.8 MG/DL — SIGNIFICANT CHANGE UP (ref 8.4–10.5)
CHLORIDE SERPL-SCNC: 91 MMOL/L — LOW (ref 98–110)
CHLORIDE SERPL-SCNC: 91 MMOL/L — LOW (ref 98–110)
CO2 SERPL-SCNC: 23 MMOL/L — SIGNIFICANT CHANGE UP (ref 17–32)
CO2 SERPL-SCNC: 25 MMOL/L — SIGNIFICANT CHANGE UP (ref 17–32)
CREAT SERPL-MCNC: 2.5 MG/DL — HIGH (ref 0.7–1.5)
CREAT SERPL-MCNC: 2.7 MG/DL — HIGH (ref 0.7–1.5)
EGFR: 18 ML/MIN/1.73M2 — LOW
EGFR: 20 ML/MIN/1.73M2 — LOW
GLUCOSE SERPL-MCNC: 108 MG/DL — HIGH (ref 70–99)
GLUCOSE SERPL-MCNC: 80 MG/DL — SIGNIFICANT CHANGE UP (ref 70–99)
HCT VFR BLD CALC: 22.2 % — LOW (ref 37–47)
HGB BLD-MCNC: 7.3 G/DL — LOW (ref 12–16)
MCHC RBC-ENTMCNC: 31.1 PG — HIGH (ref 27–31)
MCHC RBC-ENTMCNC: 32.9 G/DL — SIGNIFICANT CHANGE UP (ref 32–37)
MCV RBC AUTO: 94.5 FL — SIGNIFICANT CHANGE UP (ref 81–99)
NRBC # BLD: 0 /100 WBCS — SIGNIFICANT CHANGE UP (ref 0–0)
PLATELET # BLD AUTO: 156 K/UL — SIGNIFICANT CHANGE UP (ref 130–400)
PMV BLD: 9.5 FL — SIGNIFICANT CHANGE UP (ref 7.4–10.4)
POTASSIUM SERPL-MCNC: 4.9 MMOL/L — SIGNIFICANT CHANGE UP (ref 3.5–5)
POTASSIUM SERPL-MCNC: 5.5 MMOL/L — HIGH (ref 3.5–5)
POTASSIUM SERPL-SCNC: 4.9 MMOL/L — SIGNIFICANT CHANGE UP (ref 3.5–5)
POTASSIUM SERPL-SCNC: 5.5 MMOL/L — HIGH (ref 3.5–5)
RBC # BLD: 2.35 M/UL — LOW (ref 4.2–5.4)
RBC # FLD: 14.2 % — SIGNIFICANT CHANGE UP (ref 11.5–14.5)
SODIUM SERPL-SCNC: 124 MMOL/L — LOW (ref 135–146)
SODIUM SERPL-SCNC: 125 MMOL/L — LOW (ref 135–146)
WBC # BLD: 4.51 K/UL — LOW (ref 4.8–10.8)
WBC # FLD AUTO: 4.51 K/UL — LOW (ref 4.8–10.8)

## 2024-06-16 PROCEDURE — 99232 SBSQ HOSP IP/OBS MODERATE 35: CPT

## 2024-06-16 RX ORDER — TOLVAPTAN 30 MG/1
15 TABLET ORAL ONCE
Refills: 0 | Status: COMPLETED | OUTPATIENT
Start: 2024-06-16 | End: 2024-06-16

## 2024-06-16 RX ORDER — SODIUM ZIRCONIUM CYCLOSILICATE 10 G/10G
5 POWDER, FOR SUSPENSION ORAL ONCE
Refills: 0 | Status: COMPLETED | OUTPATIENT
Start: 2024-06-16 | End: 2024-06-16

## 2024-06-16 RX ADMIN — HEPARIN SODIUM 5000 UNIT(S): 50 INJECTION, SOLUTION INTRAVENOUS at 21:34

## 2024-06-16 RX ADMIN — LABETALOL HYDROCHLORIDE 300 MILLIGRAM(S): 300 TABLET ORAL at 05:51

## 2024-06-16 RX ADMIN — HYDRALAZINE HYDROCHLORIDE 75 MILLIGRAM(S): 50 TABLET ORAL at 14:10

## 2024-06-16 RX ADMIN — ISOSORBIDE DINITRATE 5 MILLIGRAM(S): 5 TABLET ORAL at 17:31

## 2024-06-16 RX ADMIN — ISOSORBIDE DINITRATE 5 MILLIGRAM(S): 5 TABLET ORAL at 12:35

## 2024-06-16 RX ADMIN — Medication 1 APPLICATION(S): at 12:37

## 2024-06-16 RX ADMIN — Medication 80 MILLIGRAM(S): at 00:02

## 2024-06-16 RX ADMIN — Medication 650 MILLIGRAM(S): at 14:12

## 2024-06-16 RX ADMIN — Medication 80 MILLIGRAM(S): at 17:32

## 2024-06-16 RX ADMIN — ISOSORBIDE DINITRATE 5 MILLIGRAM(S): 5 TABLET ORAL at 05:52

## 2024-06-16 RX ADMIN — TOLVAPTAN 15 MILLIGRAM(S): 30 TABLET ORAL at 18:04

## 2024-06-16 RX ADMIN — LABETALOL HYDROCHLORIDE 300 MILLIGRAM(S): 300 TABLET ORAL at 14:10

## 2024-06-16 RX ADMIN — LABETALOL HYDROCHLORIDE 300 MILLIGRAM(S): 300 TABLET ORAL at 21:43

## 2024-06-16 RX ADMIN — Medication 80 MILLIGRAM(S): at 23:19

## 2024-06-16 RX ADMIN — HEPARIN SODIUM 5000 UNIT(S): 50 INJECTION, SOLUTION INTRAVENOUS at 14:10

## 2024-06-16 RX ADMIN — HYDRALAZINE HYDROCHLORIDE 75 MILLIGRAM(S): 50 TABLET ORAL at 21:43

## 2024-06-16 RX ADMIN — Medication 80 MILLIGRAM(S): at 05:51

## 2024-06-16 RX ADMIN — PANTOPRAZOLE SODIUM 40 MILLIGRAM(S): 40 INJECTION, POWDER, FOR SOLUTION INTRAVENOUS at 05:53

## 2024-06-16 RX ADMIN — ASPIRIN 81 MILLIGRAM(S): 325 TABLET, FILM COATED ORAL at 12:35

## 2024-06-16 RX ADMIN — Medication 1 TABLET(S): at 12:36

## 2024-06-16 RX ADMIN — Medication 80 MILLIGRAM(S): at 12:36

## 2024-06-16 RX ADMIN — HEPARIN SODIUM 5000 UNIT(S): 50 INJECTION, SOLUTION INTRAVENOUS at 05:50

## 2024-06-16 RX ADMIN — Medication 650 MILLIGRAM(S): at 21:39

## 2024-06-16 RX ADMIN — SODIUM ZIRCONIUM CYCLOSILICATE 5 GRAM(S): 10 POWDER, FOR SUSPENSION ORAL at 23:16

## 2024-06-16 RX ADMIN — Medication 650 MILLIGRAM(S): at 22:40

## 2024-06-16 RX ADMIN — PREDNISONE 10 MILLIGRAM(S): 10 TABLET ORAL at 05:54

## 2024-06-16 RX ADMIN — CALCITRIOL 0.25 MICROGRAM(S): 0.25 CAPSULE, LIQUID FILLED ORAL at 12:36

## 2024-06-16 RX ADMIN — HYDRALAZINE HYDROCHLORIDE 75 MILLIGRAM(S): 50 TABLET ORAL at 05:53

## 2024-06-16 RX ADMIN — Medication 1000 UNIT(S): at 12:35

## 2024-06-17 ENCOUNTER — TRANSCRIPTION ENCOUNTER (OUTPATIENT)
Age: 69
End: 2024-06-17

## 2024-06-17 VITALS
OXYGEN SATURATION: 95 % | DIASTOLIC BLOOD PRESSURE: 60 MMHG | HEART RATE: 72 BPM | SYSTOLIC BLOOD PRESSURE: 105 MMHG | RESPIRATION RATE: 18 BRPM

## 2024-06-17 LAB
ALBUMIN SERPL ELPH-MCNC: 2.7 G/DL — LOW (ref 3.5–5.2)
ALP SERPL-CCNC: 48 U/L — SIGNIFICANT CHANGE UP (ref 30–115)
ALT FLD-CCNC: 8 U/L — SIGNIFICANT CHANGE UP (ref 0–41)
ANION GAP SERPL CALC-SCNC: 8 MMOL/L — SIGNIFICANT CHANGE UP (ref 7–14)
AST SERPL-CCNC: 14 U/L — SIGNIFICANT CHANGE UP (ref 0–41)
BILIRUB SERPL-MCNC: 0.2 MG/DL — SIGNIFICANT CHANGE UP (ref 0.2–1.2)
BUN SERPL-MCNC: 75 MG/DL — CRITICAL HIGH (ref 10–20)
CALCIUM SERPL-MCNC: 8.9 MG/DL — SIGNIFICANT CHANGE UP (ref 8.4–10.5)
CHLORIDE SERPL-SCNC: 93 MMOL/L — LOW (ref 98–110)
CO2 SERPL-SCNC: 26 MMOL/L — SIGNIFICANT CHANGE UP (ref 17–32)
CREAT SERPL-MCNC: 2.7 MG/DL — HIGH (ref 0.7–1.5)
EGFR: 18 ML/MIN/1.73M2 — LOW
GLUCOSE SERPL-MCNC: 87 MG/DL — SIGNIFICANT CHANGE UP (ref 70–99)
POTASSIUM SERPL-MCNC: 5 MMOL/L — SIGNIFICANT CHANGE UP (ref 3.5–5)
POTASSIUM SERPL-SCNC: 5 MMOL/L — SIGNIFICANT CHANGE UP (ref 3.5–5)
PROT SERPL-MCNC: 4.3 G/DL — LOW (ref 6–8)
SODIUM SERPL-SCNC: 127 MMOL/L — LOW (ref 135–146)

## 2024-06-17 PROCEDURE — 99239 HOSP IP/OBS DSCHRG MGMT >30: CPT

## 2024-06-17 RX ORDER — CALCITRIOL 0.25 UG/1
1 CAPSULE, LIQUID FILLED ORAL
Qty: 30 | Refills: 0
Start: 2024-06-17 | End: 2024-07-16

## 2024-06-17 RX ORDER — LABETALOL HYDROCHLORIDE 300 MG/1
1 TABLET ORAL
Qty: 0 | Refills: 0 | DISCHARGE
Start: 2024-06-17

## 2024-06-17 RX ORDER — HYDRALAZINE HYDROCHLORIDE 50 MG/1
3 TABLET ORAL
Qty: 0 | Refills: 0 | DISCHARGE
Start: 2024-06-17

## 2024-06-17 RX ORDER — CALCIUM CARBONATE 500(1250)
1 TABLET,CHEWABLE ORAL
Qty: 0 | Refills: 0 | DISCHARGE
Start: 2024-06-17

## 2024-06-17 RX ORDER — ACETAMINOPHEN 325 MG
2 TABLET ORAL
Qty: 0 | Refills: 0 | DISCHARGE
Start: 2024-06-17

## 2024-06-17 RX ORDER — ERYTHROPOIETIN 4000 [IU]/ML
10000 INJECTION, SOLUTION INTRAVENOUS; SUBCUTANEOUS
Qty: 4 | Refills: 1
Start: 2024-06-17 | End: 2024-08-15

## 2024-06-17 RX ORDER — ISOSORBIDE DINITRATE 5 MG/1
1 TABLET ORAL
Qty: 0 | Refills: 0 | DISCHARGE
Start: 2024-06-17

## 2024-06-17 RX ORDER — ERYTHROPOIETIN 4000 [IU]/ML
10000 INJECTION, SOLUTION INTRAVENOUS; SUBCUTANEOUS
Qty: 4 | Refills: 1
Start: 2024-06-17 | End: 2024-08-17

## 2024-06-17 RX ADMIN — HYDRALAZINE HYDROCHLORIDE 75 MILLIGRAM(S): 50 TABLET ORAL at 15:42

## 2024-06-17 RX ADMIN — HEPARIN SODIUM 5000 UNIT(S): 50 INJECTION, SOLUTION INTRAVENOUS at 05:23

## 2024-06-17 RX ADMIN — Medication 80 MILLIGRAM(S): at 05:26

## 2024-06-17 RX ADMIN — LABETALOL HYDROCHLORIDE 300 MILLIGRAM(S): 300 TABLET ORAL at 05:24

## 2024-06-17 RX ADMIN — ISOSORBIDE DINITRATE 5 MILLIGRAM(S): 5 TABLET ORAL at 05:24

## 2024-06-17 RX ADMIN — Medication 1000 UNIT(S): at 11:49

## 2024-06-17 RX ADMIN — Medication 80 MILLIGRAM(S): at 11:50

## 2024-06-17 RX ADMIN — PANTOPRAZOLE SODIUM 40 MILLIGRAM(S): 40 INJECTION, POWDER, FOR SOLUTION INTRAVENOUS at 05:24

## 2024-06-17 RX ADMIN — Medication 1 TABLET(S): at 11:49

## 2024-06-17 RX ADMIN — PREDNISONE 10 MILLIGRAM(S): 10 TABLET ORAL at 05:24

## 2024-06-17 RX ADMIN — ISOSORBIDE DINITRATE 5 MILLIGRAM(S): 5 TABLET ORAL at 11:55

## 2024-06-17 RX ADMIN — HEPARIN SODIUM 5000 UNIT(S): 50 INJECTION, SOLUTION INTRAVENOUS at 15:42

## 2024-06-17 RX ADMIN — HYDRALAZINE HYDROCHLORIDE 75 MILLIGRAM(S): 50 TABLET ORAL at 05:23

## 2024-06-17 RX ADMIN — CALCITRIOL 0.25 MICROGRAM(S): 0.25 CAPSULE, LIQUID FILLED ORAL at 11:49

## 2024-06-17 RX ADMIN — ISOSORBIDE DINITRATE 5 MILLIGRAM(S): 5 TABLET ORAL at 18:03

## 2024-06-17 RX ADMIN — Medication 1 APPLICATION(S): at 11:55

## 2024-06-17 RX ADMIN — ASPIRIN 81 MILLIGRAM(S): 325 TABLET, FILM COATED ORAL at 11:49

## 2024-06-17 RX ADMIN — Medication 80 MILLIGRAM(S): at 18:04

## 2024-06-18 ENCOUNTER — TRANSCRIPTION ENCOUNTER (OUTPATIENT)
Age: 69
End: 2024-06-18

## 2024-06-19 PROBLEM — I10 ESSENTIAL (PRIMARY) HYPERTENSION: Chronic | Status: ACTIVE | Noted: 2024-06-03

## 2024-06-19 RX ORDER — ISOSORBIDE DINITRATE 5 MG/1
1 TABLET ORAL
Qty: 90 | Refills: 0
Start: 2024-06-19 | End: 2024-07-18

## 2024-06-20 ENCOUNTER — APPOINTMENT (OUTPATIENT)
Dept: CARE COORDINATION | Facility: HOME HEALTH | Age: 69
End: 2024-06-20
Payer: MEDICARE

## 2024-06-20 ENCOUNTER — TRANSCRIPTION ENCOUNTER (OUTPATIENT)
Age: 69
End: 2024-06-20

## 2024-06-20 DIAGNOSIS — I10 ESSENTIAL (PRIMARY) HYPERTENSION: ICD-10-CM

## 2024-06-20 DIAGNOSIS — N18.9 CHRONIC KIDNEY DISEASE, UNSPECIFIED: ICD-10-CM

## 2024-06-20 DIAGNOSIS — I50.9 HEART FAILURE, UNSPECIFIED: ICD-10-CM

## 2024-06-20 PROCEDURE — 99495 TRANSJ CARE MGMT MOD F2F 14D: CPT

## 2024-06-20 RX ORDER — HYDRALAZINE HYDROCHLORIDE 50 MG/1
50 TABLET ORAL EVERY 8 HOURS
Qty: 135 | Refills: 2 | Status: ACTIVE | COMMUNITY
Start: 2024-06-20 | End: 1900-01-01

## 2024-06-20 RX ORDER — OMEPRAZOLE 20 MG/1
20 CAPSULE, DELAYED RELEASE ORAL DAILY
Qty: 30 | Refills: 0 | Status: ACTIVE | COMMUNITY
Start: 2024-06-20

## 2024-06-20 RX ORDER — B-COMPLEX WITH VITAMIN C
1250 (500 CA) TABLET ORAL DAILY
Refills: 0 | Status: ACTIVE | COMMUNITY
Start: 2024-06-20

## 2024-06-20 RX ORDER — LOSARTAN POTASSIUM 25 MG/1
25 TABLET, FILM COATED ORAL DAILY
Refills: 0 | Status: ACTIVE | COMMUNITY
Start: 2024-06-20

## 2024-06-20 RX ORDER — ETANERCEPT 25 MG/.5ML
25 SOLUTION SUBCUTANEOUS
Refills: 0 | Status: ACTIVE | COMMUNITY
Start: 2024-06-20

## 2024-06-20 RX ORDER — PREDNISONE 10 MG/1
10 TABLET ORAL DAILY
Qty: 1 | Refills: 0 | Status: ACTIVE | COMMUNITY
Start: 2024-06-20

## 2024-06-20 RX ORDER — EPOETIN ALFA 10000 [IU]/ML
10000 SOLUTION INTRAVENOUS; SUBCUTANEOUS
Refills: 0 | Status: ACTIVE | COMMUNITY
Start: 2024-06-20

## 2024-06-20 RX ORDER — KRILL/OM-3/DHA/EPA/PHOSPHO/AST 1000-230MG
81 CAPSULE ORAL DAILY
Refills: 0 | Status: ACTIVE | COMMUNITY
Start: 2024-06-20

## 2024-06-20 RX ORDER — CALCITRIOL 0.25 UG/1
0.25 CAPSULE, LIQUID FILLED ORAL
Qty: 1 | Refills: 0 | Status: ACTIVE | COMMUNITY
Start: 2024-06-20

## 2024-06-20 RX ORDER — LABETALOL HYDROCHLORIDE 300 MG/1
300 TABLET, FILM COATED ORAL EVERY 8 HOURS
Refills: 0 | Status: ACTIVE | COMMUNITY
Start: 2024-06-20

## 2024-06-20 RX ORDER — DENOSUMAB 60 MG/ML
60 INJECTION SUBCUTANEOUS
Refills: 0 | Status: ACTIVE | COMMUNITY
Start: 2024-06-20

## 2024-06-20 RX ORDER — ISOSORBIDE DINITRATE 5 MG/1
5 TABLET ORAL EVERY 8 HOURS
Refills: 0 | Status: ACTIVE | COMMUNITY
Start: 2024-06-20

## 2024-06-20 RX ORDER — MELATONIN 3 MG
25 MCG TABLET ORAL
Refills: 0 | Status: ACTIVE | COMMUNITY
Start: 2024-06-20

## 2024-06-20 NOTE — HISTORY OF PRESENT ILLNESS
[FreeTextEntry1] : follow up after being discharged from the hospital for CHF.  [de-identified] : Patient is a 68 year old female enrolled in the [facility] TCM program s/p a recent discharge from [facility] on 6/2/24 - 6/17/24 for CHF. PMH htn, hf, ckd, emily, oa, ra, dvt s/p ivc filter, sle. Patient was diuresed and sent home with HCS. HCS in place. Upon arrival patient alert in NAD, denies CP, SOB, fever, chills, or n/v/d. Reports le edema, but has improved since hospitalization. F/U appointment neph 6/21/24, pcp pending. Patient was contacted by RN Nella Garner from [facility] and medication reconciliation was done on 6/18/25 , within 48 hours of discharge.   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 Hx taken from the patient and the daughter ms Whatley via phone call ( [phone] ). Patient is AOX3 at the time of hx taking and only speaks and understands the Francois. Daughter acted as a  for the writer. Patient states that she is being swollen all over her body for the past 2 days. She states that her swelling is more pronounced in her arms and leg. She also states that it is associated with shortness of breath, productive cough, and audible chest ie abnormal loud sounds coming from the chest. On further inquiry the patient states that she has some dizziness and headache as well. Denies any fever, chest pain, palpitations abdomen pain, nausea vomiting, diarrhea, leg weakness and any other significant complaints. The daughter mentions that patient normally ambulates with a walker but need some assistance. She is able to feed her self if it is served. She recently had a right shoulder dislocation so its difficult for her to move her right arm.  As per daughter, patient was recently discharged from the hospital in may 2024, when she was admitted for hypertensive emergency. she was not discharged with any diuretics at that time even though she was diagnosed with heart failure. when writer asked what was the reason of no diuretic prescription , the daughter states that patient was never swollen and this is the first time she is having edema.  Labs: hb 8.6, sodium 121 ( 129 on previous discharge), creat 1.8 ( 2.1 baseline ) , serum calcium 7.1 , egfr 30, trop 64, bnp 8k ekg: Diagnosis Line Normal sinus rhythm, Abnormal R wave progression Imaging: xray chest: Prominent interstitium which could be acute or chronic in nature. No focal confluent airspace opacity. Patient admitted for workup of edema with shortness of breath.  # Hyponatremia - unknown etiology (suspecting SIADH) - Sosm 271, Uosm 237; Isidro 71, linda Na 119  - TSH 0.49 - Patient is edematous, BNP 8K, Alb 2.7, Prot/Crea ratio 4.2 --> nephrotic range, TTE: EF 45-50% (HFmrEF), on chronic prednisone therapy - DD:  + SIADH is high on the differential + Could be 2/2 nephrotic Sd however more common if Alb<2 or if CKD (which is present).  + Absence of pleural effusion, on RA --> less likely acute HFrEF exacerbation + HyperK and hypoNa, chronic prednisone therapy raises the suspicion of adrenal insufficiency however patient is hypertensive (multiple anti BP meds), no hypoglycemia making the diagnosis less likely + TSH Nl, Uosm>100 --> no psychogenic polydipsia or hypothyroidism + Medication induced: losartan (can explain hypoNa although rare, hyperK) or 2/2 overdiuresis with bumex - s/p 3% saline in SDU - Fluid restrict to 1.2L per day  - d/c NaCL tabs, Urea Powder, Bumex, IVF - No Na correction despite all interventions, Sosm 281 (Urea 81 --> calculated Sosm 271), Uosm 325, Isidro 70: highly suspicious for SIADH - Nephro recs noted - On tolvaptan 15mg od: Na --> 125 --> 127 - Monitor BMP q12h #Anasarca likely 2/2 mod hypoalbuminemia with possible acute HFmrEF exacerbation - TTE (5/19): EF 45 to 50%; mod incr LV basal septal thick; Grade 1 diastolic dysfunction; mod LAE - NM Jacquelin Spect 5/23/24: cardiac amyloidosis workup negative  - Albumin 2.7, Trop 64 (stable), BNP 8k (12K), EKG: no ischemic changes - Nephro following: no kidney bx for now - Ace wrap arms and elevated them to help edema - RUE/LUE duplex neg # MGUS - 5/21 SIF: weak IGM Kappa band, K/L ratio: 1.89 - SPEP/UPEP: noted UIF; Ig A/G/M (648,149,162) quantification panel, IFK 8.7 and IFL 5.3 #Hx DVT; s/p IVC filter 10-11 yrs ago - 1st DVT was 10-11 yrs ago; pt fainted in MD office; IVC filter was placed and pt tx'd w/ a/c; decision was made to leave IVC filter in because of SLE hx - 2nd DVT was a few yrs ago during COVID - tx'd w/ DOAC for 6mo and stopped - V Dupl: 5/18: b/l CHRONIC DVTs - V Dupl 6/2: I Prev SPOKE W/ DR HUTTON. READ SHOULD BE CHRONIC DVTs B/L (NOT acute on left). 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  - Holding losartan. Consider adding nifedipine if BP uncontrolled # Normocytic anemia 2/2 chronic disease - keep hgb > 7 - iron sat 15%, ferr 229 --> s/p venofer x5 (6/6-6/10) - folate >20, B12 943 - Started on retacrit 33670r qweek # Mild hypocalcemia 2/2 mild Vit D Insufficiency - f/u renal - on Vit D3 1000 q24, CaCO3 1250mg po q12 - iPTH 216, Phos 3.7 # Rheumatoid arthritis and SLE; Osteoporosis; chr rt shoulder dislocation - KECIA 1:320, DS DNA Ab<1, C3 70, C4 20 - c/w prednisone 10mg po q24  - c/w Enbrel (etanercept) 25mg every 3 weeks  - c/w prolia inj every 6 months for Osteoporosis - Tyl prn pain - FU with rheum at [facility] as OP #Goiter - TSH nl #Mild abdominal pain with bloating - improved - on simethicone 80mg po q6

## 2024-06-20 NOTE — PHYSICAL EXAM
[Normal Sclera/Conjunctiva] : normal sclera/conjunctiva [Soft] : abdomen soft [Non Tender] : non-tender [Alert and Oriented x3] : oriented to person, place, and time [Normal] : affect was normal and insight and judgment were intact [de-identified] : b/l le edema [de-identified] : uses walker

## 2024-06-20 NOTE — PLAN
[FreeTextEntry1] : CHF - reviewed daily weights, low na daily, fluid restrictions. still mildly overloaded but improved since hospitalization. encouraged ambulation as tolerated, elevate legs when not ambulating.    CKD - Cr 2.7 at d/c, has nephrology appointment tomorrow 6/21/24. will order repeat labs via [facility] and fax to nephrology office.   HTN - bp 154/82. c/w hydralazine, isosorbide dinitrate, labetolol, losartan. low na diet reviewed. f/u with pcp.

## 2024-06-20 NOTE — REVIEW OF SYSTEMS
[Fever] : no fever [Fatigue] : fatigue [Chest Pain] : no chest pain [Palpitations] : no palpitations [Lower Ext Edema] : lower extremity edema [Orthopnea] : no orthopnea [Shortness Of Breath] : no shortness of breath [Wheezing] : no wheezing [Cough] : no cough [Dyspnea on Exertion] : dyspnea on exertion [Negative] : Heme/Lymph

## 2024-06-20 NOTE — COUNSELING
[de-identified] : Pt was informed about CNs role/ STARS program and overview of transitional care reviewed with patient. In addition, yellow contact card was provided. Pt educated on topics of importance such as compliance with all provider visits, prescribed medication regimen, and low salt diet. Pt encouraged calling CN with any issues, concerns or questions, also educated to notify CN if experiencing CP, SOB , cough, increased mucus/phlegm production, abdominal discomfort/swelling, difficulty sleeping or lying flat, fever, chills, fatigue, weight gain of 2-3lbs in 24 hours or 5lbs in one week,  dizziness, lightheadedness, n/v/d/c, swelling to extremities and/or any signs of CHF exacerbation as reviewed. Reassurance provided. Will continue to monitor

## 2024-06-20 NOTE — ASSESSMENT
[FreeTextEntry1] : Patient is a 68 year old female enrolled in the [facility] TCM program s/p a recent discharge from [facility] on 6/2/24 - 6/17/24 for CHF. PMH htn, hf, ckd, emily, oa, ra, dvt s/p ivc filter, sle. Patient was diuresed and sent home with HCS. HCS in place. Upon arrival patient alert in NAD, denies CP, SOB, fever, chills, or n/v/d. Reports le edema, but has improved since hospitalization. F/U appointment neph 6/21/24, pcp pending. Patient was contacted by RN Nella Garner from TCM and medication reconciliation was done on 6/18/25 , within 48 hours of discharge.

## 2024-06-28 DIAGNOSIS — G47.30 SLEEP APNEA, UNSPECIFIED: ICD-10-CM

## 2024-06-28 DIAGNOSIS — N18.4 CHRONIC KIDNEY DISEASE, STAGE 4 (SEVERE): ICD-10-CM

## 2024-06-28 DIAGNOSIS — M32.9 SYSTEMIC LUPUS ERYTHEMATOSUS, UNSPECIFIED: ICD-10-CM

## 2024-06-28 DIAGNOSIS — E85.9 AMYLOIDOSIS, UNSPECIFIED: ICD-10-CM

## 2024-06-28 DIAGNOSIS — M81.0 AGE-RELATED OSTEOPOROSIS WITHOUT CURRENT PATHOLOGICAL FRACTURE: ICD-10-CM

## 2024-06-28 DIAGNOSIS — E22.2 SYNDROME OF INAPPROPRIATE SECRETION OF ANTIDIURETIC HORMONE: ICD-10-CM

## 2024-06-28 DIAGNOSIS — E83.51 HYPOCALCEMIA: ICD-10-CM

## 2024-06-28 DIAGNOSIS — D64.9 ANEMIA, UNSPECIFIED: ICD-10-CM

## 2024-06-28 DIAGNOSIS — D47.2 MONOCLONAL GAMMOPATHY: ICD-10-CM

## 2024-06-28 DIAGNOSIS — I82.432 ACUTE EMBOLISM AND THROMBOSIS OF LEFT POPLITEAL VEIN: ICD-10-CM

## 2024-06-28 DIAGNOSIS — E04.9 NONTOXIC GOITER, UNSPECIFIED: ICD-10-CM

## 2024-06-28 DIAGNOSIS — I82.531 CHRONIC EMBOLISM AND THROMBOSIS OF RIGHT POPLITEAL VEIN: ICD-10-CM

## 2024-06-28 DIAGNOSIS — E87.1 HYPO-OSMOLALITY AND HYPONATREMIA: ICD-10-CM

## 2024-06-28 DIAGNOSIS — D63.1 ANEMIA IN CHRONIC KIDNEY DISEASE: ICD-10-CM

## 2024-06-28 DIAGNOSIS — N08 GLOMERULAR DISORDERS IN DISEASES CLASSIFIED ELSEWHERE: ICD-10-CM

## 2024-06-28 DIAGNOSIS — I50.23 ACUTE ON CHRONIC SYSTOLIC (CONGESTIVE) HEART FAILURE: ICD-10-CM

## 2024-06-28 DIAGNOSIS — I82.511 CHRONIC EMBOLISM AND THROMBOSIS OF RIGHT FEMORAL VEIN: ICD-10-CM

## 2024-06-28 DIAGNOSIS — I13.0 HYPERTENSIVE HEART AND CHRONIC KIDNEY DISEASE WITH HEART FAILURE AND STAGE 1 THROUGH STAGE 4 CHRONIC KIDNEY DISEASE, OR UNSPECIFIED CHRONIC KIDNEY DISEASE: ICD-10-CM

## 2024-06-28 DIAGNOSIS — M06.9 RHEUMATOID ARTHRITIS, UNSPECIFIED: ICD-10-CM

## 2024-06-28 DIAGNOSIS — Z86.718 PERSONAL HISTORY OF OTHER VENOUS THROMBOSIS AND EMBOLISM: ICD-10-CM

## 2024-06-28 DIAGNOSIS — N29 OTHER DISORDERS OF KIDNEY AND URETER IN DISEASES CLASSIFIED ELSEWHERE: ICD-10-CM

## 2024-06-28 DIAGNOSIS — N04.9 NEPHROTIC SYNDROME WITH UNSPECIFIED MORPHOLOGIC CHANGES: ICD-10-CM

## 2024-06-28 DIAGNOSIS — E85.4 ORGAN-LIMITED AMYLOIDOSIS: ICD-10-CM

## 2024-07-01 ENCOUNTER — TRANSCRIPTION ENCOUNTER (OUTPATIENT)
Age: 69
End: 2024-07-01

## 2024-07-10 ENCOUNTER — TRANSCRIPTION ENCOUNTER (OUTPATIENT)
Age: 69
End: 2024-07-10

## 2024-07-19 ENCOUNTER — APPOINTMENT (OUTPATIENT)
Age: 69
End: 2024-07-19
Payer: MEDICARE

## 2024-07-19 ENCOUNTER — OUTPATIENT (OUTPATIENT)
Dept: OUTPATIENT SERVICES | Facility: HOSPITAL | Age: 69
LOS: 1 days | End: 2024-07-19
Payer: MEDICARE

## 2024-07-19 VITALS
RESPIRATION RATE: 16 BRPM | BODY MASS INDEX: 22.58 KG/M2 | SYSTOLIC BLOOD PRESSURE: 170 MMHG | TEMPERATURE: 98.2 F | HEART RATE: 68 BPM | OXYGEN SATURATION: 99 % | DIASTOLIC BLOOD PRESSURE: 83 MMHG | WEIGHT: 115 LBS | HEIGHT: 60 IN

## 2024-07-19 DIAGNOSIS — D47.2 MONOCLONAL GAMMOPATHY: ICD-10-CM

## 2024-07-19 PROCEDURE — 99213 OFFICE O/P EST LOW 20 MIN: CPT

## 2024-07-20 DIAGNOSIS — D47.2 MONOCLONAL GAMMOPATHY: ICD-10-CM

## 2024-08-21 ENCOUNTER — EMERGENCY (EMERGENCY)
Facility: HOSPITAL | Age: 69
LOS: 0 days | Discharge: HOME | End: 2024-08-21
Attending: STUDENT IN AN ORGANIZED HEALTH CARE EDUCATION/TRAINING PROGRAM
Payer: MEDICARE

## 2024-08-21 VITALS
DIASTOLIC BLOOD PRESSURE: 76 MMHG | OXYGEN SATURATION: 98 % | HEART RATE: 70 BPM | SYSTOLIC BLOOD PRESSURE: 144 MMHG | TEMPERATURE: 98 F | RESPIRATION RATE: 19 BRPM | WEIGHT: 125 LBS

## 2024-08-21 VITALS — SYSTOLIC BLOOD PRESSURE: 176 MMHG | DIASTOLIC BLOOD PRESSURE: 84 MMHG | HEART RATE: 72 BPM

## 2024-08-21 DIAGNOSIS — N18.6 END STAGE RENAL DISEASE: ICD-10-CM

## 2024-08-21 DIAGNOSIS — I13.0 HYPERTENSIVE HEART AND CHRONIC KIDNEY DISEASE WITH HEART FAILURE AND STAGE 1 THROUGH STAGE 4 CHRONIC KIDNEY DISEASE, OR UNSPECIFIED CHRONIC KIDNEY DISEASE: ICD-10-CM

## 2024-08-21 DIAGNOSIS — M06.9 RHEUMATOID ARTHRITIS, UNSPECIFIED: ICD-10-CM

## 2024-08-21 DIAGNOSIS — E85.9 AMYLOIDOSIS, UNSPECIFIED: ICD-10-CM

## 2024-08-21 DIAGNOSIS — Z86.718 PERSONAL HISTORY OF OTHER VENOUS THROMBOSIS AND EMBOLISM: ICD-10-CM

## 2024-08-21 DIAGNOSIS — E87.5 HYPERKALEMIA: ICD-10-CM

## 2024-08-21 DIAGNOSIS — I50.9 HEART FAILURE, UNSPECIFIED: ICD-10-CM

## 2024-08-21 DIAGNOSIS — M32.9 SYSTEMIC LUPUS ERYTHEMATOSUS, UNSPECIFIED: ICD-10-CM

## 2024-08-21 LAB
ANION GAP SERPL CALC-SCNC: 11 MMOL/L — SIGNIFICANT CHANGE UP (ref 7–14)
BASOPHILS # BLD AUTO: 0.03 K/UL — SIGNIFICANT CHANGE UP (ref 0–0.2)
BASOPHILS NFR BLD AUTO: 0.7 % — SIGNIFICANT CHANGE UP (ref 0–1)
BUN SERPL-MCNC: 70 MG/DL — CRITICAL HIGH (ref 10–20)
CALCIUM SERPL-MCNC: 8.4 MG/DL — SIGNIFICANT CHANGE UP (ref 8.4–10.5)
CHLORIDE SERPL-SCNC: 106 MMOL/L — SIGNIFICANT CHANGE UP (ref 98–110)
CO2 SERPL-SCNC: 22 MMOL/L — SIGNIFICANT CHANGE UP (ref 17–32)
CREAT SERPL-MCNC: 2.9 MG/DL — HIGH (ref 0.7–1.5)
EGFR: 17 ML/MIN/1.73M2 — LOW
EOSINOPHIL # BLD AUTO: 0.04 K/UL — SIGNIFICANT CHANGE UP (ref 0–0.7)
EOSINOPHIL NFR BLD AUTO: 1 % — SIGNIFICANT CHANGE UP (ref 0–8)
GAS PNL BLDV: SIGNIFICANT CHANGE UP
GLUCOSE SERPL-MCNC: 107 MG/DL — HIGH (ref 70–99)
HCT VFR BLD CALC: 25.6 % — LOW (ref 37–47)
HGB BLD-MCNC: 8.5 G/DL — LOW (ref 12–16)
IMM GRANULOCYTES NFR BLD AUTO: 0.2 % — SIGNIFICANT CHANGE UP (ref 0.1–0.3)
LYMPHOCYTES # BLD AUTO: 0.8 K/UL — LOW (ref 1.2–3.4)
LYMPHOCYTES # BLD AUTO: 19.1 % — LOW (ref 20.5–51.1)
MAGNESIUM SERPL-MCNC: 1.9 MG/DL — SIGNIFICANT CHANGE UP (ref 1.8–2.4)
MCHC RBC-ENTMCNC: 31.8 PG — HIGH (ref 27–31)
MCHC RBC-ENTMCNC: 33.2 G/DL — SIGNIFICANT CHANGE UP (ref 32–37)
MCV RBC AUTO: 95.9 FL — SIGNIFICANT CHANGE UP (ref 81–99)
MONOCYTES # BLD AUTO: 0.43 K/UL — SIGNIFICANT CHANGE UP (ref 0.1–0.6)
MONOCYTES NFR BLD AUTO: 10.3 % — HIGH (ref 1.7–9.3)
NEUTROPHILS # BLD AUTO: 2.88 K/UL — SIGNIFICANT CHANGE UP (ref 1.4–6.5)
NEUTROPHILS NFR BLD AUTO: 68.7 % — SIGNIFICANT CHANGE UP (ref 42.2–75.2)
NRBC # BLD: 0 /100 WBCS — SIGNIFICANT CHANGE UP (ref 0–0)
PLATELET # BLD AUTO: 63 K/UL — LOW (ref 130–400)
POTASSIUM SERPL-MCNC: 6.8 MMOL/L — CRITICAL HIGH (ref 3.5–5)
POTASSIUM SERPL-SCNC: 6.8 MMOL/L — CRITICAL HIGH (ref 3.5–5)
RBC # BLD: 2.67 M/UL — LOW (ref 4.2–5.4)
RBC # FLD: 13.8 % — SIGNIFICANT CHANGE UP (ref 11.5–14.5)
SODIUM SERPL-SCNC: 139 MMOL/L — SIGNIFICANT CHANGE UP (ref 135–146)
WBC # BLD: 4.19 K/UL — LOW (ref 4.8–10.8)
WBC # FLD AUTO: 4.19 K/UL — LOW (ref 4.8–10.8)

## 2024-08-21 PROCEDURE — 80048 BASIC METABOLIC PNL TOTAL CA: CPT

## 2024-08-21 PROCEDURE — 85014 HEMATOCRIT: CPT

## 2024-08-21 PROCEDURE — 83605 ASSAY OF LACTIC ACID: CPT

## 2024-08-21 PROCEDURE — 36415 COLL VENOUS BLD VENIPUNCTURE: CPT

## 2024-08-21 PROCEDURE — 93010 ELECTROCARDIOGRAM REPORT: CPT

## 2024-08-21 PROCEDURE — 84295 ASSAY OF SERUM SODIUM: CPT

## 2024-08-21 PROCEDURE — 85018 HEMOGLOBIN: CPT

## 2024-08-21 PROCEDURE — 36000 PLACE NEEDLE IN VEIN: CPT

## 2024-08-21 PROCEDURE — 85025 COMPLETE CBC W/AUTO DIFF WBC: CPT

## 2024-08-21 PROCEDURE — 82803 BLOOD GASES ANY COMBINATION: CPT

## 2024-08-21 PROCEDURE — 82330 ASSAY OF CALCIUM: CPT

## 2024-08-21 PROCEDURE — 83735 ASSAY OF MAGNESIUM: CPT

## 2024-08-21 PROCEDURE — 93005 ELECTROCARDIOGRAM TRACING: CPT

## 2024-08-21 PROCEDURE — 99285 EMERGENCY DEPT VISIT HI MDM: CPT | Mod: FS

## 2024-08-21 PROCEDURE — 99283 EMERGENCY DEPT VISIT LOW MDM: CPT | Mod: 25

## 2024-08-21 PROCEDURE — 84132 ASSAY OF SERUM POTASSIUM: CPT

## 2024-08-21 RX ORDER — SODIUM ZIRCONIUM CYCLOSILICATE 10 G/10G
10 POWDER, FOR SUSPENSION ORAL ONCE
Refills: 0 | Status: COMPLETED | OUTPATIENT
Start: 2024-08-21 | End: 2024-08-21

## 2024-08-21 RX ORDER — SODIUM ZIRCONIUM CYCLOSILICATE 10 G/10G
10 POWDER, FOR SUSPENSION ORAL
Qty: 50 | Refills: 0
Start: 2024-08-21 | End: 2024-08-25

## 2024-08-21 RX ADMIN — SODIUM ZIRCONIUM CYCLOSILICATE 10 GRAM(S): 10 POWDER, FOR SUSPENSION ORAL at 18:52

## 2024-08-21 NOTE — ED PROVIDER NOTE - NSFOLLOWUPINSTRUCTIONS_ED_ALL_ED_FT
Hyperkalemia    WHAT YOU NEED TO KNOW:    Hyperkalemia is a high level of potassium in your blood. Potassium helps control how your muscles, heart, and digestive system work.    DISCHARGE INSTRUCTIONS:    Medicines:   •Medicines will be given to remove potassium from your body. This will lower your potassium levels. This medicine may be given as a pill or an enema.    •Take your medicine as directed. Contact your healthcare provider if you think your medicine is not helping or if you have side effects. Tell him of her if you are allergic to any medicine. Keep a list of the medicines, vitamins, and herbs you take. Include the amounts, and when and why you take them. Bring the list or the pill bottles to follow-up visits. Carry your medicine list with you in case of an emergency.    Limit the amount of potassium you eat: Foods that are high in potassium include bananas, tomatoes, oranges, turkey, and milk. Orange juice, citrus juices, and tomato juice are also high in potassium. Do not use salt substitutes. You may need to meet with a dietitian to help plan the best meals for you.    Follow up with your healthcare provider as directed: Write down your questions so you remember to ask them during your visits.     Contact your healthcare provider if:   •You have nausea or are vomiting.  •You have numbness or tingling in your arms or legs.  •Your symptoms do not go away or they get worse.  •You have questions or concerns about your condition or care.    Return to the emergency department if:   •You have trouble breathing.  •You have an irregular heartbeat.  •You have trouble controlling your muscles.  •You are too tired or weak to stand up.

## 2024-08-21 NOTE — ED PROVIDER NOTE - PHYSICAL EXAMINATION
As Follows:  CONST: Well appearing in NAD  EYES: PERRL, EOMI, Sclera and conjunctiva clear.   CARD: No murmurs, rubs, or gallops; Normal rate and rhythm  RESP: BS Equal B/L, No wheezes, rhonchi or rales. No distress or accessory breathing  SKIN: Warm, dry, no acute rashes. MMM  NEURO: A&Ox3, No focal deficits.

## 2024-08-21 NOTE — ED PROVIDER NOTE - NSICDXPASTMEDICALHX_GEN_ALL_CORE_FT
PAST MEDICAL HISTORY:  Chronic kidney disease (CKD)     HTN (hypertension)     Lupus     Rheumatoid arthritis

## 2024-08-21 NOTE — ED PROVIDER NOTE - OBJECTIVE STATEMENT
0
Patient is a 69-year-old female with past medical history of hypertension, CHF, CKD, rheumatoid arthritis, lupus, DVT status post IVC placement, amyloidosis presents with family who are translating for evaluation of hyperkalemia on routine lab work.  She denies any acute symptoms including any fever, cough, sore throat, chills, nausea, vomiting, chest pain, palpitations, shortness of breath, abdominal pain, urinary complaints.

## 2024-08-21 NOTE — ED PROVIDER NOTE - QTC
437 Mildly impacted comminuted fracture through the left humeral neck  - L arm sling intact  - change tylenol to 650mg ATC  - per ortho, [facility], outpatient f/u with Dr Dennis  - PT rec home PT, OT rec pending Mildly impacted comminuted fracture through the left humeral neck  - L arm sling intact  - can change tylenol to PRN  - per ortho, [facility], outpatient f/u with Dr Dennis  - PT and OT  rec home PT, pt/family agreeable

## 2024-08-21 NOTE — ED PROVIDER NOTE - PROGRESS NOTE DETAILS
KA - Discussed results with patient.  Will DC with follow-up with primary care physician for repeat blood work as well as counseled on current medications including ACE inhibitor and Lasix which could affect potassium.  Patient's son/family verbalized understanding and will reach out to the cardiologist and the primary doctor for necessary medication adjustments.

## 2024-08-21 NOTE — ED PROVIDER NOTE - CLINICAL SUMMARY MEDICAL DECISION MAKING FREE TEXT BOX
Pt here with hyperK done on outpatient labs. Plan for labs, ekg r/o true hyperK, hemolyzed K, KHANH on CKD. Here labs notable for K 5.8 on vbg, 6.8 on CMP but hemolyzed. No ekg changes. Given lokelma here and short course for home. Pt to f/u with her nephrologist. Considered observation vs admission however pt is a good candidate for d/c home with outpatient f/u given that no life threatening pathology found in ED and pt has close outpatient f/u. Strict ED return precautions given. Pt verbalized understanding and was agreeable with plan.

## 2024-08-21 NOTE — ED ADULT NURSE NOTE - NSFALLUNIVINTERV_ED_ALL_ED
Bed/Stretcher in lowest position, wheels locked, appropriate side rails in place/Call bell, personal items and telephone in reach/Instruct patient to call for assistance before getting out of bed/chair/stretcher/Non-slip footwear applied when patient is off stretcher/[place] to call system/Physically safe environment - no spills, clutter or unnecessary equipment/Purposeful proactive rounding/Room/bathroom lighting operational, light cord in reach

## 2024-08-21 NOTE — ED PROVIDER NOTE - ATTENDING APP SHARED VISIT CONTRIBUTION OF CARE
70 yo F with hx of CKD, HTN, lupus, RA who presents for abnormal blood work done yesterday. Pt saw her rheum yesterday for R elbow fluid which was drained and had blood work drawn then. Was called back for K 6.6 so sent to ED. Pt has known CKD and is making normal UOP. No fever, cp, sob. Has full ROM of R elbow.    PMD Dr. Wagoner    CONSTITUTIONAL: well developed, nontoxic appearing, in no acute distress, speaking in full sentences  SKIN: warm, dry, no rash  HEENT: normocephalic, no conjunctival erythema, moist mucous membranes, patent airway  NECK: supple  CV:  regular rate  RESP: normal work of breathing  ABD: nondistended  MSK: moves all extremities, no cyanosis, no edema, no erythema, full flexion/extension of elbow, full supination/pronation  NEURO: alert, oriented, grossly unremarkable  PSYCH: cooperative, appropriate    A&P:  Pt here with hyperK done on outpatient labs. Plan for labs, ekg r/o true hyperK, hemolyzed K, KHANH on CKD.

## 2024-08-21 NOTE — ED PROVIDER NOTE - PATIENT PORTAL LINK FT
You can access the FollowMyHealth Patient Portal offered by [facility] by registering at the following website: [email or web address]. By joining [employer]’s FollowMyHealth portal, you will also be able to view your health information using other applications (apps) compatible with our system.

## 2025-01-17 ENCOUNTER — APPOINTMENT (OUTPATIENT)
Age: 70
End: 2025-01-17